# Patient Record
Sex: FEMALE | Race: OTHER | Employment: FULL TIME | URBAN - METROPOLITAN AREA
[De-identification: names, ages, dates, MRNs, and addresses within clinical notes are randomized per-mention and may not be internally consistent; named-entity substitution may affect disease eponyms.]

---

## 2017-06-07 PROBLEM — E01.0 THYROMEGALY: Status: ACTIVE | Noted: 2017-06-07

## 2017-06-07 PROBLEM — M54.2 PAIN IN THE NECK: Status: ACTIVE | Noted: 2017-06-07

## 2017-06-09 PROBLEM — E04.0 GOITER DIFFUSE: Status: ACTIVE | Noted: 2017-06-09

## 2017-10-17 PROBLEM — B37.31 VAGINAL YEAST INFECTION: Status: ACTIVE | Noted: 2017-10-17

## 2017-10-17 PROBLEM — E03.9 ACQUIRED HYPOTHYROIDISM: Status: ACTIVE | Noted: 2017-10-17

## 2017-10-17 PROBLEM — Z71.2 ENCOUNTER TO DISCUSS TEST RESULTS: Status: ACTIVE | Noted: 2017-10-17

## 2017-10-17 PROBLEM — Z91.89 AT RISK FOR SIDE EFFECT OF MEDICATION: Status: ACTIVE | Noted: 2017-10-17

## 2017-10-27 PROBLEM — E06.9 THYROIDITIS: Status: ACTIVE | Noted: 2017-10-27

## 2017-10-27 PROBLEM — R41.89 SLUGGISHNESS: Status: ACTIVE | Noted: 2017-10-27

## 2017-11-06 ENCOUNTER — APPOINTMENT (OUTPATIENT)
Dept: GENERAL RADIOLOGY | Age: 32
End: 2017-11-06
Attending: EMERGENCY MEDICINE
Payer: COMMERCIAL

## 2017-11-06 ENCOUNTER — HOSPITAL ENCOUNTER (EMERGENCY)
Age: 32
Discharge: HOME | End: 2017-11-06
Attending: EMERGENCY MEDICINE
Payer: COMMERCIAL

## 2017-11-06 VITALS
BODY MASS INDEX: 28.52 KG/M2 | RESPIRATION RATE: 18 BRPM | WEIGHT: 155 LBS | TEMPERATURE: 98 F | HEART RATE: 80 BPM | HEIGHT: 62 IN | OXYGEN SATURATION: 100 % | DIASTOLIC BLOOD PRESSURE: 84 MMHG | SYSTOLIC BLOOD PRESSURE: 146 MMHG

## 2017-11-06 DIAGNOSIS — R07.89 CHEST WALL PAIN: Primary | ICD-10-CM

## 2017-11-06 PROCEDURE — 99284 EMERGENCY DEPT VISIT MOD MDM: CPT | Performed by: EMERGENCY MEDICINE

## 2017-11-06 PROCEDURE — 93005 ELECTROCARDIOGRAM TRACING: CPT | Performed by: EMERGENCY MEDICINE

## 2017-11-06 PROCEDURE — 71020 XR CHEST PA LAT: CPT

## 2017-11-06 NOTE — ED NOTES
I have reviewed discharge instructions with the patient.   The patient verbalized understanding, ambulatory to the waiting room, states will follow up with PCP

## 2017-11-06 NOTE — LETTER
[facility] EMERGENCY DEPT 
[facility] [address] 
[phone] Work/School Note Date: 11/6/2017 To Whom It May concern: 
 
Yamilet Fatima was seen and treated today in the emergency room by the following provider(s): 
Attending Provider: Abran Perez MD.   
 
Yamilet Fatima may return to work on 11/7/2017. Sincerely, Gerald Gregory

## 2017-11-06 NOTE — ED TRIAGE NOTES
Patient initially complaining of left sided chest pain \"sharp\" \"for months\". Patient advises while sitting in class this morning it became worse and radiated into right side. After performing 12 lead EKG patient now describes pain as in her breast. Patient denies any shortness of breath.

## 2017-11-06 NOTE — DISCHARGE INSTRUCTIONS

## 2017-11-06 NOTE — ED PROVIDER NOTES
HPI Comments: Patient states she has been having left-sided sharp nonradiating chest pain off and on for the past 6 months. The pain seems to be getting more frequent and lasting longer recently. She has had constant pain for the past 2 days. She says the pain is somewhat positional and is reproducible to palpation of her anterior chest wall. She denies any trauma or obvious precipitating event. She has not taken any medicine for her symptoms and she denies symptoms in the past.  The pain has been constant for the past 2 days she came here for evaluation. She was using a NuvaRing for contraception but removed it herself 2 months ago. She denies any recent trips or trauma, no lower extremity swelling. She denies any history of clotting disorders in her family. Elements of this note were created using speech recognition software. As such, errors of speech recognition may be present. Patient is a 33 y.o. female presenting with chest pain. The history is provided by the patient. Chest Pain (Angina)    Pertinent negatives include no fever, no nausea and no vomiting. Past Medical History:   Diagnosis Date    Abnormal Pap smear     HGSIL    Anxiety     Asthma     last exacerbation >10yrs. rescue inhaler prn    Cervical dysplasia     H/O seasonal allergies     Other ill-defined conditions(799.89)     chronic acne    Thyroid disease     hypothyroid.  stable w/o med since 6/2012       Past Surgical History:   Procedure Laterality Date   Susana Wen GYN  2013    Cervical dysplasia-LEEP          Family History:   Problem Relation Age of Onset    Asthma Mother    Andrea Migraines Mother     Diabetes Mother     Anxiety Mother     Breast Cancer Maternal Aunt     Cancer Maternal Uncle      lung    Diabetes Maternal Grandmother     Diabetes Paternal Grandmother     Breast Cancer Other      maternal great aunt       Social History     Social History    Marital status: SINGLE     Spouse name: N/A   Emre Dickey of children: N/A    Years of education: N/A     Occupational History    Not on file. Social History Main Topics    Smoking status: Never Smoker    Smokeless tobacco: Never Used    Alcohol use Yes      Comment: rare    Drug use: No    Sexual activity: Yes     Birth control/ protection: None     Other Topics Concern    Not on file     Social History Narrative         ALLERGIES: Review of patient's allergies indicates no known allergies. Review of Systems   Constitutional: Negative for chills and fever. Cardiovascular: Positive for chest pain. Gastrointestinal: Negative for nausea and vomiting. All other systems reviewed and are negative. Vitals:    11/06/17 1238   BP: (!) 118/91   Pulse: 71   Resp: 16   Temp: 98 °F (36.7 °C)   SpO2: 100%   Weight: 70.3 kg (155 lb)   Height: 5' 2\" (1.575 m)            Physical Exam   Constitutional: She is oriented to person, place, and time. She appears well-developed and well-nourished. HENT:   Head: Normocephalic and atraumatic. Eyes: Conjunctivae are normal. Pupils are equal, round, and reactive to light. Neck: Normal range of motion. Neck supple. Cardiovascular: Normal rate and regular rhythm. Pulmonary/Chest: Effort normal and breath sounds normal. She exhibits tenderness. Reproducible chest wall tenderness to palpation as indicated   Abdominal: Soft. Bowel sounds are normal.   Musculoskeletal: She exhibits no edema or tenderness. Neurological: She is alert and oriented to person, place, and time. Skin: Skin is warm and dry. Psychiatric: She has a normal mood and affect. Her behavior is normal.   Nursing note and vitals reviewed. MDM  Number of Diagnoses or Management Options  Chest wall pain:   Diagnosis management comments: differential diagnosis: chest wall strain, pleurisy, PE, breast mass  3:00 PM discussed results with patient. She has a primary doctor she can follow up with.   Her pain is reproducible to palpation of her left upper anterior chest wall. Had long discussion with her about the extremely low risk of a PE and her need to return if her symptoms worsen.        Amount and/or Complexity of Data Reviewed  Tests in the radiology section of CPT®: ordered and reviewed  Tests in the medicine section of CPT®: ordered and reviewed    Risk of Complications, Morbidity, and/or Mortality  Presenting problems: moderate  Diagnostic procedures: moderate  Management options: moderate    Patient Progress  Patient progress: stable    ED Course       Procedures

## 2017-11-07 LAB
CALCULATED P AXIS, ECG09: 60 DEGREES
CALCULATED R AXIS, ECG10: 88 DEGREES
CALCULATED T AXIS, ECG11: 47 DEGREES
DIAGNOSIS, 93000: NORMAL
P-R INTERVAL, ECG05: 106 MS
Q-T INTERVAL, ECG07: 394 MS
QRS DURATION, ECG06: 80 MS
QTC CALCULATION (BEZET), ECG08: 429 MS
VENTRICULAR RATE, ECG03: 71 BPM

## 2018-04-11 ENCOUNTER — APPOINTMENT (RX ONLY)
Dept: URBAN - METROPOLITAN AREA CLINIC 349 | Facility: CLINIC | Age: 33
Setting detail: DERMATOLOGY
End: 2018-04-11

## 2018-04-11 DIAGNOSIS — L70.0 ACNE VULGARIS: ICD-10-CM

## 2018-04-11 PROBLEM — E03.9 HYPOTHYROIDISM, UNSPECIFIED: Status: ACTIVE | Noted: 2018-04-11

## 2018-04-11 PROBLEM — F41.9 ANXIETY DISORDER, UNSPECIFIED: Status: ACTIVE | Noted: 2018-04-11

## 2018-04-11 PROCEDURE — ? RECOMMENDATIONS

## 2018-04-11 PROCEDURE — ? PRESCRIPTION

## 2018-04-11 PROCEDURE — 99213 OFFICE O/P EST LOW 20 MIN: CPT

## 2018-04-11 PROCEDURE — ? COUNSELING

## 2018-04-11 RX ORDER — MINOCYCLINE HYDROCHLORIDE 100 MG/1
CAPSULE ORAL
Qty: 30 | Refills: 1 | Status: ERX | COMMUNITY
Start: 2018-04-11

## 2018-04-11 RX ORDER — DAPSONE 75 MG/G
GEL TOPICAL
Qty: 1 | Refills: 3 | Status: ERX | COMMUNITY
Start: 2018-04-11

## 2018-04-11 RX ORDER — BENZOYL PEROXIDE 95 MG/G
AEROSOL, FOAM TOPICAL
Qty: 1 | Refills: 2 | Status: ERX | COMMUNITY
Start: 2018-04-11

## 2018-04-11 RX ADMIN — MINOCYCLINE HYDROCHLORIDE: 100 CAPSULE ORAL at 16:24

## 2018-04-11 RX ADMIN — BENZOYL PEROXIDE: 95 AEROSOL, FOAM TOPICAL at 16:28

## 2018-04-11 RX ADMIN — DAPSONE: 75 GEL TOPICAL at 16:24

## 2018-04-11 ASSESSMENT — LOCATION SIMPLE DESCRIPTION DERM
LOCATION SIMPLE: CHEST
LOCATION SIMPLE: UPPER BACK
LOCATION SIMPLE: LEFT CHEEK
LOCATION SIMPLE: RIGHT CHEEK

## 2018-04-11 ASSESSMENT — LOCATION DETAILED DESCRIPTION DERM
LOCATION DETAILED: LEFT INFERIOR CENTRAL MALAR CHEEK
LOCATION DETAILED: UPPER STERNUM
LOCATION DETAILED: SUPERIOR THORACIC SPINE
LOCATION DETAILED: RIGHT INFERIOR CENTRAL MALAR CHEEK

## 2018-04-11 ASSESSMENT — LOCATION ZONE DERM
LOCATION ZONE: TRUNK
LOCATION ZONE: FACE

## 2018-04-11 NOTE — PROCEDURE: RECOMMENDATIONS
Detail Level: Zone
Recommendations (Free Text): Patient has thyroid problems on medication for this and checked regularly \\nPt states periods abnormal. She is going to GYN today, told check for PCOS. She is not taking BCP\\nMinocycline 100 mg once daily for 8 weeks \\nRiax foam apply to chest and back let dry, leave on overnight and was off every morning \\nAczone apply to clean dry face and chest nightly \\nPatient was on accutane in the past

## 2018-04-11 NOTE — PROCEDURE: COUNSELING
Topical Retinoid counseling:  Patient advised to apply a pea-sized amount only at bedtime and wait 30 minutes after washing their face before applying.  If too drying, patient may add a non-comedogenic moisturizer. The patient verbalized understanding of the proper use and possible adverse effects of retinoids.  All of the patient's questions and concerns were addressed.
Include Pregnancy/Lactation Warning?: No
Benzoyl Peroxide Counseling: Patient counseled that medicine may cause skin irritation and bleach clothing.  In the event of skin irritation, the patient was advised to reduce the amount of the drug applied or use it less frequently.   The patient verbalized understanding of the proper use and possible adverse effects of benzoyl peroxide.  All of the patient's questions and concerns were addressed.
Topical Retinoid Pregnancy And Lactation Text: This medication is Pregnancy Category C. It is unknown if this medication is excreted in breast milk.
Topical Clindamycin Pregnancy And Lactation Text: This medication is Pregnancy Category B and is considered safe during pregnancy. It is unknown if it is excreted in breast milk.
Erythromycin Pregnancy And Lactation Text: This medication is Pregnancy Category B and is considered safe during pregnancy. It is also excreted in breast milk.
High Dose Vitamin A Counseling: Side effects reviewed, pt to contact office should one occur.
High Dose Vitamin A Pregnancy And Lactation Text: High dose vitamin A therapy is contraindicated during pregnancy and breast feeding.
Birth Control Pills Counseling: Birth Control Pill Counseling: I discussed with the patient the potential side effects of OCPs including but not limited to increased risk of stroke, heart attack, thrombophlebitis, deep venous thrombosis, hepatic adenomas, breast changes, GI upset, headaches, and depression.  The patient verbalized understanding of the proper use and possible adverse effects of OCPs. All of the patient's questions and concerns were addressed.
Doxycycline Pregnancy And Lactation Text: This medication is Pregnancy Category D and not consider safe during pregnancy. It is also excreted in breast milk but is considered safe for shorter treatment courses.
Dapsone Pregnancy And Lactation Text: This medication is Pregnancy Category C and is not considered safe during pregnancy or breast feeding.
Minocycline Counseling: Patient advised regarding possible photosensitivity and discoloration of the teeth, skin, lips, tongue and gums.  Patient instructed to avoid sunlight, if possible.  When exposed to sunlight, patients should wear protective clothing, sunglasses, and sunscreen.  The patient was instructed to call the office immediately if the following severe adverse effects occur:  hearing changes, easy bruising/bleeding, severe headache, or vision changes.  The patient verbalized understanding of the proper use and possible adverse effects of minocycline.  All of the patient's questions and concerns were addressed.
Minocycline Pregnancy And Lactation Text: This medication is Pregnancy Category D and not consider safe during pregnancy. It is also excreted in breast milk.
Tetracycline Counseling: Patient counseled regarding possible photosensitivity and increased risk for sunburn.  Patient instructed to avoid sunlight, if possible.  When exposed to sunlight, patients should wear protective clothing, sunglasses, and sunscreen.  The patient was instructed to call the office immediately if the following severe adverse effects occur:  hearing changes, easy bruising/bleeding, severe headache, or vision changes.  The patient verbalized understanding of the proper use and possible adverse effects of tetracycline.  All of the patient's questions and concerns were addressed. Patient understands to avoid pregnancy while on therapy due to potential birth defects.
Dapsone Counseling: I discussed with the patient the risks of dapsone including but not limited to hemolytic anemia, agranulocytosis, rashes, methemoglobinemia, kidney failure, peripheral neuropathy, headaches, GI upset, and liver toxicity.  Patients who start dapsone require monitoring including baseline LFTs and weekly CBCs for the first month, then every month thereafter.  The patient verbalized understanding of the proper use and possible adverse effects of dapsone.  All of the patient's questions and concerns were addressed.
Isotretinoin Counseling: Patient should get monthly blood tests, not donate blood, not drive at night if vision affected, not share medication, and not undergo elective surgery for 6 months after tx completed. Side effects reviewed, pt to contact office should one occur.
Bactrim Counseling:  I discussed with the patient the risks of sulfa antibiotics including but not limited to GI upset, allergic reaction, drug rash, diarrhea, dizziness, photosensitivity, and yeast infections.  Rarely, more serious reactions can occur including but not limited to aplastic anemia, agranulocytosis, methemoglobinemia, blood dyscrasias, liver or kidney failure, lung infiltrates or desquamative/blistering drug rashes.
Tazorac Pregnancy And Lactation Text: This medication is not safe during pregnancy. It is unknown if this medication is excreted in breast milk.
Erythromycin Counseling:  I discussed with the patient the risks of erythromycin including but not limited to GI upset, allergic reaction, drug rash, diarrhea, increase in liver enzymes, and yeast infections.
Azithromycin Counseling:  I discussed with the patient the risks of azithromycin including but not limited to GI upset, allergic reaction, drug rash, diarrhea, and yeast infections.
Topical Sulfur Applications Counseling: Topical Sulfur Counseling: Patient counseled that this medication may cause skin irritation or allergic reactions.  In the event of skin irritation, the patient was advised to reduce the amount of the drug applied or use it less frequently.   The patient verbalized understanding of the proper use and possible adverse effects of topical sulfur application.  All of the patient's questions and concerns were addressed.
Spironolactone Counseling: Patient advised regarding risks of diarrhea, abdominal pain, hyperkalemia, birth defects (for female patients), liver toxicity and renal toxicity. The patient may need blood work to monitor liver and kidney function and potassium levels while on therapy. The patient verbalized understanding of the proper use and possible adverse effects of spironolactone.  All of the patient's questions and concerns were addressed.
Topical Clindamycin Counseling: Patient counseled that this medication may cause skin irritation or allergic reactions.  In the event of skin irritation, the patient was advised to reduce the amount of the drug applied or use it less frequently.   The patient verbalized understanding of the proper use and possible adverse effects of clindamycin.  All of the patient's questions and concerns were addressed.
Benzoyl Peroxide Pregnancy And Lactation Text: This medication is Pregnancy Category C. It is unknown if benzoyl peroxide is excreted in breast milk.
Tazorac Counseling:  Patient advised that medication is irritating and drying.  Patient may need to apply sparingly and wash off after an hour before eventually leaving it on overnight.  The patient verbalized understanding of the proper use and possible adverse effects of tazorac.  All of the patient's questions and concerns were addressed.
Isotretinoin Pregnancy And Lactation Text: This medication is Pregnancy Category X and is considered extremely dangerous during pregnancy. It is unknown if it is excreted in breast milk.
Topical Sulfur Applications Pregnancy And Lactation Text: This medication is Pregnancy Category C and has an unknown safety profile during pregnancy. It is unknown if this topical medication is excreted in breast milk.
Doxycycline Counseling:  Patient counseled regarding possible photosensitivity and increased risk for sunburn.  Patient instructed to avoid sunlight, if possible.  When exposed to sunlight, patients should wear protective clothing, sunglasses, and sunscreen.  The patient was instructed to call the office immediately if the following severe adverse effects occur:  hearing changes, easy bruising/bleeding, severe headache, or vision changes.  The patient verbalized understanding of the proper use and possible adverse effects of doxycycline.  All of the patient's questions and concerns were addressed.
Azithromycin Pregnancy And Lactation Text: This medication is considered safe during pregnancy and is also secreted in breast milk.
Birth Control Pills Pregnancy And Lactation Text: This medication should be avoided if pregnant and for the first 30 days post-partum.
Bactrim Pregnancy And Lactation Text: This medication is Pregnancy Category D and is known to cause fetal risk.  It is also excreted in breast milk.
Detail Level: Zone
Spironolactone Pregnancy And Lactation Text: This medication can cause feminization of the male fetus and should be avoided during pregnancy. The active metabolite is also found in breast milk.

## 2018-07-11 ENCOUNTER — RX ONLY (OUTPATIENT)
Age: 33
Setting detail: RX ONLY
End: 2018-07-11

## 2018-07-11 ENCOUNTER — APPOINTMENT (RX ONLY)
Dept: URBAN - METROPOLITAN AREA CLINIC 349 | Facility: CLINIC | Age: 33
Setting detail: DERMATOLOGY
End: 2018-07-11

## 2018-07-11 DIAGNOSIS — L70.0 ACNE VULGARIS: ICD-10-CM | Status: IMPROVED

## 2018-07-11 PROCEDURE — ? COUNSELING

## 2018-07-11 PROCEDURE — ? RECOMMENDATIONS

## 2018-07-11 PROCEDURE — ? PRESCRIPTION

## 2018-07-11 PROCEDURE — 99213 OFFICE O/P EST LOW 20 MIN: CPT

## 2018-07-11 RX ORDER — DROSPIRENONE AND ETHINYL ESTRADIOL 0.02-3(28)
KIT ORAL
Qty: 1 | Refills: 2 | Status: ERX | COMMUNITY
Start: 2018-07-11

## 2018-07-11 RX ORDER — BENZOYL PEROXIDE 95 MG/G
AEROSOL, FOAM TOPICAL
Qty: 1 | Refills: 2 | Status: ERX

## 2018-07-11 RX ADMIN — DROSPIRENONE AND ETHINYL ESTRADIOL: KIT at 15:23

## 2018-07-11 ASSESSMENT — LOCATION SIMPLE DESCRIPTION DERM
LOCATION SIMPLE: RIGHT CHEEK
LOCATION SIMPLE: LEFT CHEEK
LOCATION SIMPLE: CHEST
LOCATION SIMPLE: UPPER BACK

## 2018-07-11 ASSESSMENT — LOCATION DETAILED DESCRIPTION DERM
LOCATION DETAILED: RIGHT INFERIOR CENTRAL MALAR CHEEK
LOCATION DETAILED: UPPER STERNUM
LOCATION DETAILED: LEFT INFERIOR CENTRAL MALAR CHEEK
LOCATION DETAILED: SUPERIOR THORACIC SPINE

## 2018-07-11 ASSESSMENT — LOCATION ZONE DERM
LOCATION ZONE: FACE
LOCATION ZONE: TRUNK

## 2018-07-11 NOTE — PROCEDURE: COUNSELING
Detail Level: Zone
Topical Sulfur Applications Pregnancy And Lactation Text: This medication is Pregnancy Category C and has an unknown safety profile during pregnancy. It is unknown if this topical medication is excreted in breast milk.
Erythromycin Counseling:  I discussed with the patient the risks of erythromycin including but not limited to GI upset, allergic reaction, drug rash, diarrhea, increase in liver enzymes, and yeast infections.
Include Pregnancy/Lactation Warning?: No
Spironolactone Pregnancy And Lactation Text: This medication can cause feminization of the male fetus and should be avoided during pregnancy. The active metabolite is also found in breast milk.
High Dose Vitamin A Pregnancy And Lactation Text: High dose vitamin A therapy is contraindicated during pregnancy and breast feeding.
Birth Control Pills Counseling: Birth Control Pill Counseling: I discussed with the patient the potential side effects of OCPs including but not limited to increased risk of stroke, heart attack, thrombophlebitis, deep venous thrombosis, hepatic adenomas, breast changes, GI upset, headaches, and depression.  The patient verbalized understanding of the proper use and possible adverse effects of OCPs. All of the patient's questions and concerns were addressed.
Birth Control Pills Pregnancy And Lactation Text: This medication should be avoided if pregnant and for the first 30 days post-partum.
Tazorac Counseling:  Patient advised that medication is irritating and drying.  Patient may need to apply sparingly and wash off after an hour before eventually leaving it on overnight.  The patient verbalized understanding of the proper use and possible adverse effects of tazorac.  All of the patient's questions and concerns were addressed.
Topical Clindamycin Counseling: Patient counseled that this medication may cause skin irritation or allergic reactions.  In the event of skin irritation, the patient was advised to reduce the amount of the drug applied or use it less frequently.   The patient verbalized understanding of the proper use and possible adverse effects of clindamycin.  All of the patient's questions and concerns were addressed.
High Dose Vitamin A Counseling: Side effects reviewed, pt to contact office should one occur.
Topical Clindamycin Pregnancy And Lactation Text: This medication is Pregnancy Category B and is considered safe during pregnancy. It is unknown if it is excreted in breast milk.
Tetracycline Pregnancy And Lactation Text: This medication is Pregnancy Category D and not consider safe during pregnancy. It is also excreted in breast milk.
Bactrim Counseling:  I discussed with the patient the risks of sulfa antibiotics including but not limited to GI upset, allergic reaction, drug rash, diarrhea, dizziness, photosensitivity, and yeast infections.  Rarely, more serious reactions can occur including but not limited to aplastic anemia, agranulocytosis, methemoglobinemia, blood dyscrasias, liver or kidney failure, lung infiltrates or desquamative/blistering drug rashes.
Topical Retinoid Pregnancy And Lactation Text: This medication is Pregnancy Category C. It is unknown if this medication is excreted in breast milk.
Tetracycline Counseling: Patient counseled regarding possible photosensitivity and increased risk for sunburn.  Patient instructed to avoid sunlight, if possible.  When exposed to sunlight, patients should wear protective clothing, sunglasses, and sunscreen.  The patient was instructed to call the office immediately if the following severe adverse effects occur:  hearing changes, easy bruising/bleeding, severe headache, or vision changes.  The patient verbalized understanding of the proper use and possible adverse effects of tetracycline.  All of the patient's questions and concerns were addressed. Patient understands to avoid pregnancy while on therapy due to potential birth defects.
Dapsone Counseling: I discussed with the patient the risks of dapsone including but not limited to hemolytic anemia, agranulocytosis, rashes, methemoglobinemia, kidney failure, peripheral neuropathy, headaches, GI upset, and liver toxicity.  Patients who start dapsone require monitoring including baseline LFTs and weekly CBCs for the first month, then every month thereafter.  The patient verbalized understanding of the proper use and possible adverse effects of dapsone.  All of the patient's questions and concerns were addressed.
Tazorac Pregnancy And Lactation Text: This medication is not safe during pregnancy. It is unknown if this medication is excreted in breast milk.
Isotretinoin Counseling: Patient should get monthly blood tests, not donate blood, not drive at night if vision affected, not share medication, and not undergo elective surgery for 6 months after tx completed. Side effects reviewed, pt to contact office should one occur.
Spironolactone Counseling: Patient advised regarding risks of diarrhea, abdominal pain, hyperkalemia, birth defects (for female patients), liver toxicity and renal toxicity. The patient may need blood work to monitor liver and kidney function and potassium levels while on therapy. The patient verbalized understanding of the proper use and possible adverse effects of spironolactone.  All of the patient's questions and concerns were addressed.
Doxycycline Pregnancy And Lactation Text: This medication is Pregnancy Category D and not consider safe during pregnancy. It is also excreted in breast milk but is considered safe for shorter treatment courses.
Azithromycin Pregnancy And Lactation Text: This medication is considered safe during pregnancy and is also secreted in breast milk.
Topical Retinoid counseling:  Patient advised to apply a pea-sized amount only at bedtime and wait 30 minutes after washing their face before applying.  If too drying, patient may add a non-comedogenic moisturizer. The patient verbalized understanding of the proper use and possible adverse effects of retinoids.  All of the patient's questions and concerns were addressed.
Benzoyl Peroxide Counseling: Patient counseled that medicine may cause skin irritation and bleach clothing.  In the event of skin irritation, the patient was advised to reduce the amount of the drug applied or use it less frequently.   The patient verbalized understanding of the proper use and possible adverse effects of benzoyl peroxide.  All of the patient's questions and concerns were addressed.
Azithromycin Counseling:  I discussed with the patient the risks of azithromycin including but not limited to GI upset, allergic reaction, drug rash, diarrhea, and yeast infections.
Benzoyl Peroxide Pregnancy And Lactation Text: This medication is Pregnancy Category C. It is unknown if benzoyl peroxide is excreted in breast milk.
Minocycline Counseling: Patient advised regarding possible photosensitivity and discoloration of the teeth, skin, lips, tongue and gums.  Patient instructed to avoid sunlight, if possible.  When exposed to sunlight, patients should wear protective clothing, sunglasses, and sunscreen.  The patient was instructed to call the office immediately if the following severe adverse effects occur:  hearing changes, easy bruising/bleeding, severe headache, or vision changes.  The patient verbalized understanding of the proper use and possible adverse effects of minocycline.  All of the patient's questions and concerns were addressed.
Doxycycline Counseling:  Patient counseled regarding possible photosensitivity and increased risk for sunburn.  Patient instructed to avoid sunlight, if possible.  When exposed to sunlight, patients should wear protective clothing, sunglasses, and sunscreen.  The patient was instructed to call the office immediately if the following severe adverse effects occur:  hearing changes, easy bruising/bleeding, severe headache, or vision changes.  The patient verbalized understanding of the proper use and possible adverse effects of doxycycline.  All of the patient's questions and concerns were addressed.
Isotretinoin Pregnancy And Lactation Text: This medication is Pregnancy Category X and is considered extremely dangerous during pregnancy. It is unknown if it is excreted in breast milk.
Erythromycin Pregnancy And Lactation Text: This medication is Pregnancy Category B and is considered safe during pregnancy. It is also excreted in breast milk.
Dapsone Pregnancy And Lactation Text: This medication is Pregnancy Category C and is not considered safe during pregnancy or breast feeding.
Topical Sulfur Applications Counseling: Topical Sulfur Counseling: Patient counseled that this medication may cause skin irritation or allergic reactions.  In the event of skin irritation, the patient was advised to reduce the amount of the drug applied or use it less frequently.   The patient verbalized understanding of the proper use and possible adverse effects of topical sulfur application.  All of the patient's questions and concerns were addressed.
Bactrim Pregnancy And Lactation Text: This medication is Pregnancy Category D and is known to cause fetal risk.  It is also excreted in breast milk.

## 2018-07-11 NOTE — PROCEDURE: RECOMMENDATIONS
Detail Level: Zone
Recommendations (Free Text): Kari bcp everyday \\nContinue Riax foam apply to chest and back let dry, leave on overnight and was off every morning \\nStart Aczone apply to clean dry face twice a day

## 2018-08-21 PROBLEM — T14.8XXA MUSCLE STRAIN: Status: ACTIVE | Noted: 2018-08-21

## 2018-08-21 PROBLEM — M54.9 UPPER BACK PAIN: Status: ACTIVE | Noted: 2018-08-21

## 2018-10-04 ENCOUNTER — APPOINTMENT (RX ONLY)
Dept: URBAN - METROPOLITAN AREA CLINIC 349 | Facility: CLINIC | Age: 33
Setting detail: DERMATOLOGY
End: 2018-10-04

## 2018-10-04 DIAGNOSIS — L70.0 ACNE VULGARIS: ICD-10-CM | Status: STABLE

## 2018-10-04 PROBLEM — F41.9 ANXIETY DISORDER, UNSPECIFIED: Status: ACTIVE | Noted: 2018-10-04

## 2018-10-04 PROCEDURE — ? COUNSELING

## 2018-10-04 PROCEDURE — ? PRESCRIPTION

## 2018-10-04 PROCEDURE — 99213 OFFICE O/P EST LOW 20 MIN: CPT

## 2018-10-04 PROCEDURE — ? RECOMMENDATIONS

## 2018-10-04 RX ORDER — SODIUM SULFACETAMIDE AND SULFUR 80; 40 MG/ML; MG/ML
LOTION TOPICAL
Qty: 1 | Refills: 3 | Status: ERX | COMMUNITY
Start: 2018-10-04

## 2018-10-04 RX ADMIN — SODIUM SULFACETAMIDE AND SULFUR: 80; 40 LOTION TOPICAL at 18:03

## 2018-10-04 ASSESSMENT — LOCATION ZONE DERM
LOCATION ZONE: TRUNK
LOCATION ZONE: FACE

## 2018-10-04 ASSESSMENT — LOCATION DETAILED DESCRIPTION DERM
LOCATION DETAILED: UPPER STERNUM
LOCATION DETAILED: RIGHT INFERIOR CENTRAL MALAR CHEEK
LOCATION DETAILED: LEFT INFERIOR CENTRAL MALAR CHEEK
LOCATION DETAILED: SUPERIOR THORACIC SPINE

## 2018-10-04 ASSESSMENT — LOCATION SIMPLE DESCRIPTION DERM
LOCATION SIMPLE: RIGHT CHEEK
LOCATION SIMPLE: UPPER BACK
LOCATION SIMPLE: LEFT CHEEK
LOCATION SIMPLE: CHEST

## 2018-10-04 NOTE — PROCEDURE: RECOMMENDATIONS
Recommendations (Free Text): Pt stopped bcp because of anxiety \\nContinue Riax foam apply to chest and back let dry, leave on overnight and was off every morning \\nContinue Aczone apply to clean dry face twice a day\\nSulfacleanse 8-4% wash face twice a day
Detail Level: Zone

## 2018-10-04 NOTE — PROCEDURE: COUNSELING
Doxycycline Pregnancy And Lactation Text: This medication is Pregnancy Category D and not consider safe during pregnancy. It is also excreted in breast milk but is considered safe for shorter treatment courses.
Tetracycline Counseling: Patient counseled regarding possible photosensitivity and increased risk for sunburn.  Patient instructed to avoid sunlight, if possible.  When exposed to sunlight, patients should wear protective clothing, sunglasses, and sunscreen.  The patient was instructed to call the office immediately if the following severe adverse effects occur:  hearing changes, easy bruising/bleeding, severe headache, or vision changes.  The patient verbalized understanding of the proper use and possible adverse effects of tetracycline.  All of the patient's questions and concerns were addressed. Patient understands to avoid pregnancy while on therapy due to potential birth defects.
Spironolactone Pregnancy And Lactation Text: This medication can cause feminization of the male fetus and should be avoided during pregnancy. The active metabolite is also found in breast milk.
Azithromycin Counseling:  I discussed with the patient the risks of azithromycin including but not limited to GI upset, allergic reaction, drug rash, diarrhea, and yeast infections.
Doxycycline Counseling:  Patient counseled regarding possible photosensitivity and increased risk for sunburn.  Patient instructed to avoid sunlight, if possible.  When exposed to sunlight, patients should wear protective clothing, sunglasses, and sunscreen.  The patient was instructed to call the office immediately if the following severe adverse effects occur:  hearing changes, easy bruising/bleeding, severe headache, or vision changes.  The patient verbalized understanding of the proper use and possible adverse effects of doxycycline.  All of the patient's questions and concerns were addressed.
Dapsone Pregnancy And Lactation Text: This medication is Pregnancy Category C and is not considered safe during pregnancy or breast feeding.
High Dose Vitamin A Pregnancy And Lactation Text: High dose vitamin A therapy is contraindicated during pregnancy and breast feeding.
Minocycline Pregnancy And Lactation Text: This medication is Pregnancy Category D and not consider safe during pregnancy. It is also excreted in breast milk.
Isotretinoin Counseling: Patient should get monthly blood tests, not donate blood, not drive at night if vision affected, not share medication, and not undergo elective surgery for 6 months after tx completed. Side effects reviewed, pt to contact office should one occur.
Topical Retinoid counseling:  Patient advised to apply a pea-sized amount only at bedtime and wait 30 minutes after washing their face before applying.  If too drying, patient may add a non-comedogenic moisturizer. The patient verbalized understanding of the proper use and possible adverse effects of retinoids.  All of the patient's questions and concerns were addressed.
Birth Control Pills Pregnancy And Lactation Text: This medication should be avoided if pregnant and for the first 30 days post-partum.
Birth Control Pills Counseling: Birth Control Pill Counseling: I discussed with the patient the potential side effects of OCPs including but not limited to increased risk of stroke, heart attack, thrombophlebitis, deep venous thrombosis, hepatic adenomas, breast changes, GI upset, headaches, and depression.  The patient verbalized understanding of the proper use and possible adverse effects of OCPs. All of the patient's questions and concerns were addressed.
Detail Level: Zone
Isotretinoin Pregnancy And Lactation Text: This medication is Pregnancy Category X and is considered extremely dangerous during pregnancy. It is unknown if it is excreted in breast milk.
Topical Sulfur Applications Counseling: Topical Sulfur Counseling: Patient counseled that this medication may cause skin irritation or allergic reactions.  In the event of skin irritation, the patient was advised to reduce the amount of the drug applied or use it less frequently.   The patient verbalized understanding of the proper use and possible adverse effects of topical sulfur application.  All of the patient's questions and concerns were addressed.
Include Pregnancy/Lactation Warning?: No
Topical Retinoid Pregnancy And Lactation Text: This medication is Pregnancy Category C. It is unknown if this medication is excreted in breast milk.
Topical Clindamycin Pregnancy And Lactation Text: This medication is Pregnancy Category B and is considered safe during pregnancy. It is unknown if it is excreted in breast milk.
Erythromycin Pregnancy And Lactation Text: This medication is Pregnancy Category B and is considered safe during pregnancy. It is also excreted in breast milk.
Spironolactone Counseling: Patient advised regarding risks of diarrhea, abdominal pain, hyperkalemia, birth defects (for female patients), liver toxicity and renal toxicity. The patient may need blood work to monitor liver and kidney function and potassium levels while on therapy. The patient verbalized understanding of the proper use and possible adverse effects of spironolactone.  All of the patient's questions and concerns were addressed.
Benzoyl Peroxide Pregnancy And Lactation Text: This medication is Pregnancy Category C. It is unknown if benzoyl peroxide is excreted in breast milk.
Dapsone Counseling: I discussed with the patient the risks of dapsone including but not limited to hemolytic anemia, agranulocytosis, rashes, methemoglobinemia, kidney failure, peripheral neuropathy, headaches, GI upset, and liver toxicity.  Patients who start dapsone require monitoring including baseline LFTs and weekly CBCs for the first month, then every month thereafter.  The patient verbalized understanding of the proper use and possible adverse effects of dapsone.  All of the patient's questions and concerns were addressed.
Bactrim Counseling:  I discussed with the patient the risks of sulfa antibiotics including but not limited to GI upset, allergic reaction, drug rash, diarrhea, dizziness, photosensitivity, and yeast infections.  Rarely, more serious reactions can occur including but not limited to aplastic anemia, agranulocytosis, methemoglobinemia, blood dyscrasias, liver or kidney failure, lung infiltrates or desquamative/blistering drug rashes.
Benzoyl Peroxide Counseling: Patient counseled that medicine may cause skin irritation and bleach clothing.  In the event of skin irritation, the patient was advised to reduce the amount of the drug applied or use it less frequently.   The patient verbalized understanding of the proper use and possible adverse effects of benzoyl peroxide.  All of the patient's questions and concerns were addressed.
Tazorac Counseling:  Patient advised that medication is irritating and drying.  Patient may need to apply sparingly and wash off after an hour before eventually leaving it on overnight.  The patient verbalized understanding of the proper use and possible adverse effects of tazorac.  All of the patient's questions and concerns were addressed.
Azithromycin Pregnancy And Lactation Text: This medication is considered safe during pregnancy and is also secreted in breast milk.
Bactrim Pregnancy And Lactation Text: This medication is Pregnancy Category D and is known to cause fetal risk.  It is also excreted in breast milk.
Topical Clindamycin Counseling: Patient counseled that this medication may cause skin irritation or allergic reactions.  In the event of skin irritation, the patient was advised to reduce the amount of the drug applied or use it less frequently.   The patient verbalized understanding of the proper use and possible adverse effects of clindamycin.  All of the patient's questions and concerns were addressed.
Topical Sulfur Applications Pregnancy And Lactation Text: This medication is Pregnancy Category C and has an unknown safety profile during pregnancy. It is unknown if this topical medication is excreted in breast milk.
Tazorac Pregnancy And Lactation Text: This medication is not safe during pregnancy. It is unknown if this medication is excreted in breast milk.
Minocycline Counseling: Patient advised regarding possible photosensitivity and discoloration of the teeth, skin, lips, tongue and gums.  Patient instructed to avoid sunlight, if possible.  When exposed to sunlight, patients should wear protective clothing, sunglasses, and sunscreen.  The patient was instructed to call the office immediately if the following severe adverse effects occur:  hearing changes, easy bruising/bleeding, severe headache, or vision changes.  The patient verbalized understanding of the proper use and possible adverse effects of minocycline.  All of the patient's questions and concerns were addressed.
High Dose Vitamin A Counseling: Side effects reviewed, pt to contact office should one occur.
Erythromycin Counseling:  I discussed with the patient the risks of erythromycin including but not limited to GI upset, allergic reaction, drug rash, diarrhea, increase in liver enzymes, and yeast infections.

## 2019-02-15 PROBLEM — Z91.89 AT RISK FOR SIDE EFFECT OF MEDICATION: Status: RESOLVED | Noted: 2017-10-17 | Resolved: 2019-02-15

## 2019-02-15 PROBLEM — Z87.42 HX OF ABNORMAL CERVICAL PAP SMEAR: Status: ACTIVE | Noted: 2019-02-15

## 2019-02-15 PROBLEM — Z71.2 ENCOUNTER TO DISCUSS TEST RESULTS: Status: RESOLVED | Noted: 2017-10-17 | Resolved: 2019-02-15

## 2019-02-15 PROBLEM — Z80.3 FAMILY HISTORY OF BREAST CANCER: Status: ACTIVE | Noted: 2019-02-15

## 2019-02-15 PROBLEM — B37.31 VAGINAL YEAST INFECTION: Status: RESOLVED | Noted: 2017-10-17 | Resolved: 2019-02-15

## 2019-02-15 PROBLEM — N64.4 MASTALGIA: Status: ACTIVE | Noted: 2019-02-15

## 2019-02-15 PROBLEM — Z30.09 CONTRACEPTIVE EDUCATION: Status: ACTIVE | Noted: 2019-02-15

## 2019-02-15 PROBLEM — A60.09 HERPES GENITALIS IN WOMEN: Status: ACTIVE | Noted: 2019-02-15

## 2019-06-14 ENCOUNTER — HOSPITAL ENCOUNTER (EMERGENCY)
Age: 34
Discharge: HOME | End: 2019-06-14
Attending: EMERGENCY MEDICINE
Payer: COMMERCIAL

## 2019-06-14 VITALS
HEIGHT: 62 IN | OXYGEN SATURATION: 100 % | WEIGHT: 160 LBS | DIASTOLIC BLOOD PRESSURE: 76 MMHG | TEMPERATURE: 98.1 F | SYSTOLIC BLOOD PRESSURE: 120 MMHG | BODY MASS INDEX: 29.44 KG/M2 | RESPIRATION RATE: 16 BRPM | HEART RATE: 75 BPM

## 2019-06-14 DIAGNOSIS — J02.9 ACUTE PHARYNGITIS, UNSPECIFIED ETIOLOGY: Primary | ICD-10-CM

## 2019-06-14 DIAGNOSIS — R00.2 PALPITATIONS: ICD-10-CM

## 2019-06-14 LAB
ALBUMIN SERPL-MCNC: 4.1 G/DL (ref 3.5–5)
ALBUMIN/GLOB SERPL: 1 {RATIO} (ref 1.2–3.5)
ALP SERPL-CCNC: 56 U/L (ref 50–136)
ALT SERPL-CCNC: 46 U/L (ref 12–65)
ANION GAP SERPL CALC-SCNC: 8 MMOL/L (ref 7–16)
AST SERPL-CCNC: 18 U/L (ref 15–37)
ATRIAL RATE: 70 BPM
BASOPHILS # BLD: 0 K/UL (ref 0–0.2)
BASOPHILS NFR BLD: 1 % (ref 0–2)
BILIRUB SERPL-MCNC: 0.4 MG/DL (ref 0.2–1.1)
BUN SERPL-MCNC: 17 MG/DL (ref 6–23)
CALCIUM SERPL-MCNC: 9.4 MG/DL (ref 8.3–10.4)
CALCULATED P AXIS, ECG09: 67 DEGREES
CALCULATED R AXIS, ECG10: 87 DEGREES
CALCULATED T AXIS, ECG11: 43 DEGREES
CHLORIDE SERPL-SCNC: 104 MMOL/L (ref 98–107)
CO2 SERPL-SCNC: 26 MMOL/L (ref 21–32)
CREAT SERPL-MCNC: 0.76 MG/DL (ref 0.6–1)
DIAGNOSIS, 93000: NORMAL
DIFFERENTIAL METHOD BLD: ABNORMAL
EOSINOPHIL # BLD: 0.1 K/UL (ref 0–0.8)
EOSINOPHIL NFR BLD: 2 % (ref 0.5–7.8)
ERYTHROCYTE [DISTWIDTH] IN BLOOD BY AUTOMATED COUNT: 13.4 % (ref 11.9–14.6)
GLOBULIN SER CALC-MCNC: 4.2 G/DL (ref 2.3–3.5)
GLUCOSE SERPL-MCNC: 82 MG/DL (ref 65–100)
HCG UR QL: NEGATIVE
HCT VFR BLD AUTO: 44.7 % (ref 35.8–46.3)
HGB BLD-MCNC: 13.9 G/DL (ref 11.7–15.4)
IMM GRANULOCYTES # BLD AUTO: 0 K/UL (ref 0–0.5)
IMM GRANULOCYTES NFR BLD AUTO: 0 % (ref 0–5)
LYMPHOCYTES # BLD: 1.8 K/UL (ref 0.5–4.6)
LYMPHOCYTES NFR BLD: 28 % (ref 13–44)
MAGNESIUM SERPL-MCNC: 2.1 MG/DL (ref 1.8–2.4)
MCH RBC QN AUTO: 27.9 PG (ref 26.1–32.9)
MCHC RBC AUTO-ENTMCNC: 31.1 G/DL (ref 31.4–35)
MCV RBC AUTO: 89.6 FL (ref 79.6–97.8)
MONOCYTES # BLD: 0.4 K/UL (ref 0.1–1.3)
MONOCYTES NFR BLD: 6 % (ref 4–12)
NEUTS SEG # BLD: 4 K/UL (ref 1.7–8.2)
NEUTS SEG NFR BLD: 63 % (ref 43–78)
NRBC # BLD: 0 K/UL (ref 0–0.2)
P-R INTERVAL, ECG05: 112 MS
PLATELET # BLD AUTO: 247 K/UL (ref 150–450)
PMV BLD AUTO: 11.7 FL (ref 9.4–12.3)
POTASSIUM SERPL-SCNC: 3.5 MMOL/L (ref 3.5–5.1)
PROT SERPL-MCNC: 8.3 G/DL (ref 6.3–8.2)
Q-T INTERVAL, ECG07: 404 MS
QRS DURATION, ECG06: 78 MS
QTC CALCULATION (BEZET), ECG08: 436 MS
RBC # BLD AUTO: 4.99 M/UL (ref 4.05–5.2)
SODIUM SERPL-SCNC: 138 MMOL/L (ref 136–145)
VENTRICULAR RATE, ECG03: 70 BPM
WBC # BLD AUTO: 6.3 K/UL (ref 4.3–11.1)

## 2019-06-14 PROCEDURE — 99284 EMERGENCY DEPT VISIT MOD MDM: CPT | Performed by: EMERGENCY MEDICINE

## 2019-06-14 PROCEDURE — 93005 ELECTROCARDIOGRAM TRACING: CPT | Performed by: EMERGENCY MEDICINE

## 2019-06-14 PROCEDURE — 81025 URINE PREGNANCY TEST: CPT

## 2019-06-14 PROCEDURE — 85025 COMPLETE CBC W/AUTO DIFF WBC: CPT

## 2019-06-14 PROCEDURE — 81003 URINALYSIS AUTO W/O SCOPE: CPT | Performed by: EMERGENCY MEDICINE

## 2019-06-14 PROCEDURE — 83735 ASSAY OF MAGNESIUM: CPT

## 2019-06-14 PROCEDURE — 80053 COMPREHEN METABOLIC PANEL: CPT

## 2019-06-14 RX ORDER — CIPROFLOXACIN 500 MG/1
500 TABLET ORAL
COMMUNITY
Start: 2019-06-12 | End: 2019-06-15

## 2019-06-14 RX ORDER — PENICILLIN V POTASSIUM 500 MG/1
500 TABLET, FILM COATED ORAL 2 TIMES DAILY
Qty: 20 TAB | Refills: 0 | Status: SHIPPED | OUTPATIENT
Start: 2019-06-14 | End: 2019-06-24

## 2019-06-14 NOTE — ED TRIAGE NOTES
Pt c/o feeling like her heart is racing and she cannot catch her breath. Pt states she is currently on meds for UTI that she went to PCP on Wednesday to be treated. Pt does not appear in distress in triage. Pt c/o allergy symptoms, feeling congested. She is concerned about strep throat.

## 2019-06-14 NOTE — ED PROVIDER NOTES
Patient states she has been having episodes of palpitations, intermittent knee for the past year. She states it happens a couple times a month, typically. She describes it as a fluttering feeling with some shortness of breath. She states that recently the palpitations have been lasting longer. She denies any pattern to the palpitations, denies any aggravating or alleviating factors. She has not taken any medicine for her symptoms. She also has been having a sore throat over the past couple days. She denies any fever, states the pain is worse when she swallows. Elements of this note were created using speech recognition software. As such, errors of speech recognition may be present. Past Medical History:   Diagnosis Date    Abnormal Pap smear     HGSIL    Abnormal Papanicolaou smear of cervix     Anxiety     Asthma     last exacerbation >10yrs. rescue inhaler prn    Cervical dysplasia     H/O seasonal allergies     Other ill-defined conditions(799.89)     chronic acne    PTSD (post-traumatic stress disorder)     Shingles     2 weeks ago    Thyroid disease     hypothyroid.  stable w/o med since 6/2012       Past Surgical History:   Procedure Laterality Date   [facility]  2013    Cervical dysplasia-LEEP          Family History:   Problem Relation Age of Onset    Asthma Mother    [facility] Migraines Mother     Diabetes Mother     Anxiety Mother     Breast Cancer Maternal Aunt     Cancer Maternal Uncle         lung    Diabetes Maternal Grandmother     Diabetes Paternal Grandmother     Breast Cancer Other         maternal great aunt       Social History     Socioeconomic History    Marital status: SINGLE     Spouse name: Not on file    Number of children: Not on file    Years of education: Not on file    Highest education level: Not on file   Occupational History    Not on file   Social Needs    Financial resource strain: Not on file    Food insecurity:     Worry: Not on file Inability: Not on file    Transportation needs:     Medical: Not on file     Non-medical: Not on file   Tobacco Use    Smoking status: Never Smoker    Smokeless tobacco: Never Used   Substance and Sexual Activity    Alcohol use: Yes     Comment: rare    Drug use: No    Sexual activity: Yes     Partners: Male     Birth control/protection: Inserts   Lifestyle    Physical activity:     Days per week: Not on file     Minutes per session: Not on file    Stress: Not on file   Relationships    Social connections:     Talks on phone: Not on file     Gets together: Not on file     Attends Hinduism service: Not on file     Active member of club or organization: Not on file     Attends meetings of clubs or organizations: Not on file     Relationship status: Not on file    Intimate partner violence:     Fear of current or ex partner: Not on file     Emotionally abused: Not on file     Physically abused: Not on file     Forced sexual activity: Not on file   Other Topics Concern     Service Not Asked    Blood Transfusions Not Asked    Caffeine Concern No    Occupational Exposure Not Asked   Williemae Red Hazards Not Asked    Sleep Concern Not Asked    Stress Concern Not Asked    Weight Concern Not Asked    Special Diet Not Asked    Back Care Not Asked    Exercise Yes    Bike Helmet Not Asked    Seat Belt Yes    Self-Exams Yes   Social History Narrative    Abuse: Feels safe at home, no history of physical abuse, no history of sexual abuse         ALLERGIES: Patient has no known allergies. Review of Systems   Constitutional: Negative for chills and fever. HENT: Positive for sore throat. Cardiovascular: Positive for palpitations. Negative for chest pain. Gastrointestinal: Negative for nausea and vomiting. All other systems reviewed and are negative.       Vitals:    06/14/19 1202   BP: (!) 134/98   Pulse: 75   Resp: 16   Temp: 98.1 °F (36.7 °C)   SpO2: 100%   Weight: 72.6 kg (160 lb)   Height: 5' 2\" (1.575 m)            Physical Exam   Constitutional: She is oriented to person, place, and time. She appears well-developed and well-nourished. HENT:   Head: Normocephalic and atraumatic. Mouth/Throat: Oropharyngeal exudate present. Eyes: Pupils are equal, round, and reactive to light. Conjunctivae are normal.   Neck: Normal range of motion. Neck supple. Cardiovascular: Normal rate, regular rhythm and normal heart sounds. Pulmonary/Chest: Effort normal and breath sounds normal.   Abdominal: Soft. Bowel sounds are normal.   Musculoskeletal: She exhibits no edema or tenderness. Neurological: She is alert and oriented to person, place, and time. Skin: Skin is warm and dry. Psychiatric: She has a normal mood and affect. Her behavior is normal.   Nursing note and vitals reviewed. MDM  Number of Diagnoses or Management Options  Acute pharyngitis, unspecified etiology: new and does not require workup  Palpitations: new and requires workup  Diagnosis management comments: 2:16 PM discussed results with patient, she has a primary doctor. She can follow-up with.        Amount and/or Complexity of Data Reviewed  Clinical lab tests: ordered and reviewed    Risk of Complications, Morbidity, and/or Mortality  Presenting problems: moderate  Diagnostic procedures: moderate  Management options: moderate    Patient Progress  Patient progress: stable         Procedures

## 2019-06-14 NOTE — ED NOTES
I have reviewed discharge instructions with the patient. The patient verbalized understanding. Patient left ED via Discharge Method: ambulatory to Home with self. Opportunity for questions and clarification provided. Patient given 1 scripts. To continue your aftercare when you leave the hospital, you may receive an automated call from our care team to check in on how you are doing. This is a free service and part of our promise to provide the best care and service to meet your aftercare needs.  If you have questions, or wish to unsubscribe from this service please call [phone]. Thank you for Choosing our [facility] Emergency Department.

## 2022-03-18 PROBLEM — T14.8XXA MUSCLE STRAIN: Status: ACTIVE | Noted: 2018-08-21

## 2022-03-18 PROBLEM — Z80.3 FAMILY HISTORY OF BREAST CANCER: Status: ACTIVE | Noted: 2019-02-15

## 2022-03-18 PROBLEM — N64.4 MASTALGIA: Status: ACTIVE | Noted: 2019-02-15

## 2022-03-19 PROBLEM — E04.0 GOITER DIFFUSE: Status: ACTIVE | Noted: 2017-06-09

## 2022-03-19 PROBLEM — E06.9 THYROIDITIS: Status: ACTIVE | Noted: 2017-10-27

## 2022-03-19 PROBLEM — Z30.09 CONTRACEPTIVE EDUCATION: Status: ACTIVE | Noted: 2019-02-15

## 2022-03-19 PROBLEM — E03.9 ACQUIRED HYPOTHYROIDISM: Status: ACTIVE | Noted: 2017-10-17

## 2022-03-19 PROBLEM — M54.9 UPPER BACK PAIN: Status: ACTIVE | Noted: 2018-08-21

## 2022-03-19 PROBLEM — M54.2 PAIN IN THE NECK: Status: ACTIVE | Noted: 2017-06-07

## 2022-03-19 PROBLEM — A60.09 HERPES GENITALIS IN WOMEN: Status: ACTIVE | Noted: 2019-02-15

## 2022-03-19 PROBLEM — R41.89 SLUGGISHNESS: Status: ACTIVE | Noted: 2017-10-27

## 2022-03-19 PROBLEM — E01.0 THYROMEGALY: Status: ACTIVE | Noted: 2017-06-07

## 2022-03-20 PROBLEM — Z87.42 HX OF ABNORMAL CERVICAL PAP SMEAR: Status: ACTIVE | Noted: 2019-02-15

## 2022-06-30 ENCOUNTER — HOSPITAL ENCOUNTER (EMERGENCY)
Age: 37
Discharge: HOME | End: 2022-06-30
Attending: EMERGENCY MEDICINE
Payer: COMMERCIAL

## 2022-06-30 VITALS
BODY MASS INDEX: 28.52 KG/M2 | DIASTOLIC BLOOD PRESSURE: 88 MMHG | TEMPERATURE: 99 F | HEIGHT: 62 IN | WEIGHT: 155 LBS | OXYGEN SATURATION: 100 % | HEART RATE: 78 BPM | SYSTOLIC BLOOD PRESSURE: 125 MMHG | RESPIRATION RATE: 16 BRPM

## 2022-06-30 DIAGNOSIS — R10.2 SUPRAPUBIC ABDOMINAL PAIN: Primary | ICD-10-CM

## 2022-06-30 LAB
APPEARANCE UR: CLEAR
BILIRUB UR QL: NEGATIVE
COLOR UR: YELLOW
GLUCOSE UR STRIP.AUTO-MCNC: NEGATIVE MG/DL
HGB UR QL STRIP: NEGATIVE
KETONES UR QL STRIP.AUTO: NEGATIVE MG/DL
LEUKOCYTE ESTERASE UR QL STRIP.AUTO: NEGATIVE
NITRITE UR QL STRIP.AUTO: NEGATIVE
PH UR STRIP: 7 [PH] (ref 5–9)
PROT UR STRIP-MCNC: NEGATIVE MG/DL
SP GR UR REFRACTOMETRY: 1.02 (ref 1–1.02)
UROBILINOGEN UR QL STRIP.AUTO: 0.2 EU/DL (ref 0.2–1)

## 2022-06-30 PROCEDURE — 81003 URINALYSIS AUTO W/O SCOPE: CPT

## 2022-06-30 PROCEDURE — 99283 EMERGENCY DEPT VISIT LOW MDM: CPT

## 2022-06-30 RX ORDER — PHENAZOPYRIDINE HYDROCHLORIDE 200 MG/1
200 TABLET, FILM COATED ORAL 3 TIMES DAILY PRN
Qty: 6 TABLET | Refills: 0 | Status: SHIPPED | OUTPATIENT
Start: 2022-06-30 | End: 2022-07-02

## 2022-06-30 RX ORDER — CEPHALEXIN 500 MG/1
500 CAPSULE ORAL 2 TIMES DAILY
Qty: 6 CAPSULE | Refills: 0 | Status: SHIPPED | OUTPATIENT
Start: 2022-06-30 | End: 2022-07-03

## 2022-06-30 ASSESSMENT — PAIN DESCRIPTION - PAIN TYPE: TYPE: CHRONIC PAIN

## 2022-06-30 ASSESSMENT — PAIN DESCRIPTION - ORIENTATION: ORIENTATION: LOWER

## 2022-06-30 ASSESSMENT — PAIN SCALES - GENERAL
PAINLEVEL_OUTOF10: 5
PAINLEVEL_OUTOF10: 7

## 2022-06-30 ASSESSMENT — PAIN - FUNCTIONAL ASSESSMENT: PAIN_FUNCTIONAL_ASSESSMENT: 0-10

## 2022-06-30 ASSESSMENT — PAIN DESCRIPTION - ONSET: ONSET: ON-GOING

## 2022-06-30 ASSESSMENT — PAIN DESCRIPTION - LOCATION: LOCATION: ABDOMEN

## 2022-06-30 ASSESSMENT — ENCOUNTER SYMPTOMS: ABDOMINAL PAIN: 1

## 2022-06-30 ASSESSMENT — PAIN DESCRIPTION - FREQUENCY: FREQUENCY: INTERMITTENT

## 2022-07-01 NOTE — ED PROVIDER NOTES
[facility] Emergency Department Provider Note                   PCP:                Monty David MD               Age: 40 y.o. Sex: female       ICD-10-CM    1. Suprapubic abdominal pain  R10.2        DISPOSITION Decision To Discharge 06/30/2022 10:15:09 PM       New Prescriptions    CEPHALEXIN (KEFLEX) 500 MG CAPSULE    Take 1 capsule by mouth 2 times daily for 3 days    PHENAZOPYRIDINE (PYRIDIUM) 200 MG TABLET    Take 1 tablet by mouth 3 times daily as needed for Pain       Orders Placed This Encounter   Procedures    Urinalysis w rflx microscopic        Jacque Kilts, DO 10:16 PM      MDM  Number of Diagnoses or Management Options  Suprapubic abdominal pain  Diagnosis management comments: Abdominal pain, dysuria, STD, vaginal discharge,       Amount and/or Complexity of Data Reviewed  Clinical lab tests: ordered and reviewed         Kiko Dubose is a 40 y.o. female who presents to the Emergency Department with chief complaint of    Chief Complaint   Patient presents with    Abdominal Pain     Lower      Patient is a pleasant 67-year-old female presenting to the emergency department today complaining of some mild suprapubic and left lower quadrant abdominal tenderness which started earlier in the day but is progressively worsened. The patient states she is not had any real urinary frequency or dysuria noted but now it feels like she is developing a urinary tract infection. She denies any vaginal discharge. Patient is not concerned about possible STDs. All other systems reviewed and are negative. Review of Systems   Constitutional: Negative. Gastrointestinal: Positive for abdominal pain. Genitourinary: Negative for difficulty urinating, dysuria, flank pain, frequency and hematuria. Musculoskeletal: Negative. Skin: Negative.         Past Medical History:   Diagnosis Date    Abnormal Pap smear     HGSIL    Abnormal Papanicolaou smear of cervix     Anxiety     Asthma last exacerbation >10yrs. rescue inhaler prn    Cervical dysplasia     H/O seasonal allergies     Other ill-defined conditions(799.89)     chronic acne    PTSD (post-traumatic stress disorder)     Shingles     2 weeks ago    Thyroid disease     hypothyroid. stable w/o med since 6/2012        Past Surgical History:   Procedure Laterality Date    GYN  2013    Cervical dysplasia-LEEP         Family History   Problem Relation Age of Onset    Diabetes Paternal Grandmother     Diabetes Maternal Grandmother     Breast Cancer Other         maternal great aunt    Asthma Mother    [employer] Migraines Mother     Diabetes Mother     Anxiety Disorder Mother     Breast Cancer Maternal Aunt     Cancer Maternal Uncle         lung           Social Connections:     Frequency of Communication with Friends and Family: Not on file    Frequency of Social Gatherings with Friends and Family: Not on file    Attends Pentecostal Services: Not on file    Active Member of Clubs or Organizations: Not on file    Attends Club or Organization Meetings: Not on file    Marital Status: Not on file        Allergies   Allergen Reactions    Fluconazole Hives        Vitals signs and nursing note reviewed. Patient Vitals for the past 4 hrs:   Temp Pulse Resp BP SpO2   06/30/22 2034 99 °F (37.2 °C) 72 16 (!) 131/100 100 %          Physical Exam     GENERAL:The patient has Body mass index is 28.35 kg/m². Well-hydrated. No acute distress. VITAL SIGNS: Heart rate, blood pressure, respiratory rate reviewed as recorded in  nurse's notes  EYES: Pupils reactive. Extraocular motion intact. No conjunctival redness or drainage. LUNGS: No accessory muscle use  CARDIOVASCULAR: Regular rate and rhythm  ABD: Suprapubic and left lower quadrant abdominal tenderness which is mild. No guarding or rigidity present. Positive bowel sounds in all 4 quadrants. No Patricia Sr and Rovsing signs present.   EXTREMITIES: Pt moving all 4 extremities with out limitations. Normal muscle tone. NEUROLOGIC: Cranial nerve exam reveals face is symmetrical, tongue is midline  speech is clear. No focal deficits noted  SKIN: No rash or petechiae. Good skin turgor palpated. PSYCHIATRIC: Alert and oriented. Appropriate behavior and judgment. Procedures      Labs Reviewed   URINALYSIS - Abnormal; Notable for the following components:       Result Value    Specific Gravity, UA 1.025 (*)     All other components within normal limits        No orders to display                      ED Course as of 06/30/22 2216   Thu Jun 30, 2022 2214 Case discussed with the patient. Urinalysis is negative at this time is secondary to new onset of symptoms consistent with her previous urinary tract infection she will be placed on 3 days of antibiotics [KH]      ED Course User Index  [KH] Jose Antonio Rios DO        Voice dictation software was used during the making of this note. This software is not perfect and grammatical and other typographical errors may be present. This note has not been completely proofread for errors.        Jose Antonio Rios DO  06/30/22 2157       Jose Antonio Rios DO  06/30/22 2216

## 2022-07-01 NOTE — ED TRIAGE NOTES
Patient educated to use artificial tears at least 4 times a day. We will schedule repeat measurements prior to surgery. Pt states she has a hx of a liver mass. Pt wants to be checked to make sure nothing is going on due to pain when she bends over to pick something up.

## 2022-07-01 NOTE — ED NOTES
I have reviewed discharge instructions with the patient. The patient verbalized understanding. Patient left ED via Discharge Method: ambulatory to Home with ( self). Opportunity for questions and clarification provided. Patient given 2 scripts. To continue your aftercare when you leave the hospital, you may receive an automated call from our care team to check in on how you are doing. This is a free service and part of our promise to provide the best care and service to meet your aftercare needs.  If you have questions, or wish to unsubscribe from this service please call [phone]. Thank you for Choosing our [facility] Emergency Department.       Viola Brooks RN  06/30/22 7238

## 2022-07-01 NOTE — ED NOTES
Pt c/o flank pain/abd pain.   States that the pain is worse whenever she bends down     David Cheng RN  06/30/22 8529

## 2022-08-23 ENCOUNTER — HOSPITAL ENCOUNTER (EMERGENCY)
Age: 37
Discharge: HOME | End: 2022-08-23
Attending: EMERGENCY MEDICINE
Payer: COMMERCIAL

## 2022-08-23 VITALS
RESPIRATION RATE: 19 BRPM | DIASTOLIC BLOOD PRESSURE: 109 MMHG | HEART RATE: 73 BPM | SYSTOLIC BLOOD PRESSURE: 162 MMHG | BODY MASS INDEX: 27.6 KG/M2 | OXYGEN SATURATION: 100 % | HEIGHT: 62 IN | WEIGHT: 150 LBS | TEMPERATURE: 98.6 F

## 2022-08-23 DIAGNOSIS — H60.502 ACUTE OTITIS EXTERNA OF LEFT EAR, UNSPECIFIED TYPE: Primary | ICD-10-CM

## 2022-08-23 PROCEDURE — 99281 EMR DPT VST MAYX REQ PHY/QHP: CPT

## 2022-08-23 PROCEDURE — 99282 EMERGENCY DEPT VISIT SF MDM: CPT

## 2022-08-23 RX ORDER — NEOMYCIN SULFATE, POLYMYXIN B SULFATE AND HYDROCORTISONE 10; 3.5; 1 MG/ML; MG/ML; [USP'U]/ML
4 SUSPENSION/ DROPS AURICULAR (OTIC) EVERY 6 HOURS SCHEDULED
Status: DISCONTINUED | OUTPATIENT
Start: 2022-08-23 | End: 2022-08-23 | Stop reason: HOSPADM

## 2022-08-23 RX ORDER — ACETAMINOPHEN 500 MG
1000 TABLET ORAL
Status: DISCONTINUED | OUTPATIENT
Start: 2022-08-23 | End: 2022-08-23 | Stop reason: HOSPADM

## 2022-08-23 ASSESSMENT — ENCOUNTER SYMPTOMS
PHOTOPHOBIA: 0
SHORTNESS OF BREATH: 0
RHINORRHEA: 0
ABDOMINAL PAIN: 0
VOMITING: 0
NAUSEA: 0
SORE THROAT: 1
COUGH: 0
FACIAL SWELLING: 0
VOICE CHANGE: 0
EYE REDNESS: 0
TROUBLE SWALLOWING: 0

## 2022-08-23 ASSESSMENT — PAIN DESCRIPTION - ORIENTATION: ORIENTATION: LEFT

## 2022-08-23 ASSESSMENT — PAIN DESCRIPTION - LOCATION: LOCATION: EAR

## 2022-08-23 ASSESSMENT — PAIN SCALES - GENERAL: PAINLEVEL_OUTOF10: 10

## 2022-08-23 ASSESSMENT — PAIN - FUNCTIONAL ASSESSMENT: PAIN_FUNCTIONAL_ASSESSMENT: 0-10

## 2022-08-23 NOTE — ED PROVIDER NOTES
[facility] Emergency Department Provider Note                   PCP:                Joe Patrick MD               Age: 40 y.o. Sex: female       ICD-10-CM    1. Acute otitis externa of left ear, unspecified type  H60.502           DISPOSITION Decision To Discharge 08/23/2022 05:04:30 AM        MDM  Number of Diagnoses or Management Options  Acute otitis externa of left ear, unspecified type: new, needed workup  Diagnosis management comments: Afebrile. Will obtain rapid COVID swab, strep swab. Tylenol ordered. Urine pregnancy test pending. Patient with symptoms consistent with otitis externa of left ear canal.  Discussed with pharmacist, only otic drops available or polymyxin/neomycin/hydrocortisone drops. Have ordered for patient to receive here.  =============================================  Patient reportedly eloped as she was unhappy for having to wait as nurses were unable to swab her or treat her with otic drops or Tylenol. Patient was to be given drops in ER prior to discharge so that she can take them home and use them. Was informed that patient eloped around 20 minutes after she left the ER. Amount and/or Complexity of Data Reviewed  Clinical lab tests: reviewed and ordered  Tests in the medicine section of CPT®: ordered and reviewed  Review and summarize past medical records: yes  Independent visualization of images, tracings, or specimens: yes    Risk of Complications, Morbidity, and/or Mortality  Presenting problems: low  Diagnostic procedures: low  Management options: low    Patient Progress  Patient progress: stable       Orders Placed This Encounter   Procedures    COVID-19, Rapid    Group A Strep Screen By PCR    Pregnancy, Urine        Halley Charles is a 40 y.o. female who presents to the Emergency Department with chief complaint of  L ear pain.     Chief Complaint   Patient presents with    Otalgia      36-year-old female with history of hypothyroidism presents with complaint of mild sore throat, left ear pain that she woke up with around midnight this evening. States that she worked on the yard on Saturday and has a history of bad seasonal allergies. Uncertain of any recent sick contacts. Denies cough, shortness of breath, fever, chills, neck pain, nausea, vomiting abdominal pain. States last menstrual cycle was August 4. Denies myalgias, neck pain. States that she attempted over-the-counter pain medication prior to arrival.  Rates pain as moderate to left ear. Denies radiation of pain. States that hearing seems decreased. States that she swam around a week ago and has had otitis externa in the past.  States that her hearing is slightly decreased. Denies vision changes or symptoms. Describes pain as sharp. Denies any alleviating factors. States that pain is exacerbated with palpating and pulling on affected ear. Describes pain is constant. The history is provided by the patient. No  was used. Review of Systems   Constitutional:  Negative for chills, fatigue and fever. HENT:  Positive for ear pain and sore throat. Negative for congestion, dental problem, ear discharge, facial swelling, rhinorrhea, trouble swallowing and voice change. Eyes:  Negative for photophobia and redness. Respiratory:  Negative for cough and shortness of breath. Cardiovascular:  Negative for chest pain. Gastrointestinal:  Negative for abdominal pain, nausea and vomiting. Genitourinary:  Negative for dysuria and flank pain. Musculoskeletal:  Negative for myalgias, neck pain and neck stiffness. Skin:  Negative for rash. Neurological:  Negative for dizziness, weakness, light-headedness and headaches. Hematological:  Does not bruise/bleed easily. Past Medical History:   Diagnosis Date    Abnormal Pap smear     HGSIL    Abnormal Papanicolaou smear of cervix     Anxiety     Asthma     last exacerbation >10yrs.  rescue inhaler prn    Cervical dysplasia     H/O seasonal allergies     Other ill-defined conditions(799.89)     chronic acne    PTSD (post-traumatic stress disorder)     Shingles     2 weeks ago    Thyroid disease     hypothyroid. stable w/o med since 6/2012        Past Surgical History:   Procedure Laterality Date    GYN  2013    Cervical dysplasia-LEEP         Family History   Problem Relation Age of Onset    Diabetes Paternal Grandmother     Diabetes Maternal Grandmother     Breast Cancer Other         maternal great aunt    Asthma Mother     Migraines Mother     Diabetes Mother     Anxiety Disorder Mother     Breast Cancer Maternal Aunt     Cancer Maternal Uncle         lung        Social History     Socioeconomic History    Marital status: Single   Tobacco Use    Smoking status: Never    Smokeless tobacco: Never   Substance and Sexual Activity    Alcohol use: Yes    Drug use: No   Social History Narrative    Abuse: Feels safe at home, no history of physical abuse, no history of sexual abuse         Fluconazole     Previous Medications    ALBUTEROL SULFATE  (90 BASE) MCG/ACT INHALER    Inhale 2 puffs into the lungs every 6 hours as needed    ETONOGESTREL-ETHINYL ESTRADIOL (NUVARING) 0.12-0.015 MG/24HR VAGINAL RING    Place ring intravaginally for 3 weeks q month, leave out for one week    FEXOFENADINE (ALLEGRA) 180 MG TABLET    Take 180 mg by mouth daily    FLUTICASONE (FLONASE) 50 MCG/ACT NASAL SPRAY    2 sprays by Nasal route daily    LEVOTHYROXINE (SYNTHROID) 25 MCG TABLET    Take 25 mcg by mouth every morning (before breakfast)    LIDOCAINE (LIDODERM) 5 %    Apply patch to the affected area for 12 hours a day and remove for 12 hours a day. PROPRANOLOL (INDERAL) 20 MG TABLET    Take 20 mg by mouth 3 times daily    SUMATRIPTAN (IMITREX) 50 MG TABLET    Take 50 mg by mouth    TIZANIDINE (ZANAFLEX) 4 MG TABLET    Take 4 mg by mouth        Vitals signs and nursing note reviewed.    Patient Vitals for the past 4 hrs:   Temp Pulse Resp BP SpO2   08/23/22 0443 98.6 °F (37 °C) 73 19 (!) 162/109 100 %          Physical Exam  Vitals and nursing note reviewed. Constitutional:       Appearance: Normal appearance. HENT:      Head: Normocephalic. Right Ear: Tympanic membrane and ear canal normal.      Left Ear: Tympanic membrane normal.      Ears:      Comments: Erythema and inflammation noted to left ear canal.  No purulent drainage noted. No mastoid process tenderness noted. Left TM with no significant bulging or erythema. Nose: Nose normal. No congestion. Mouth/Throat:      Mouth: Mucous membranes are moist.      Pharynx: No oropharyngeal exudate or posterior oropharyngeal erythema. Comments: Uvula midline. No tonsillar exudate noted. No trismus. No stridor. No posterior oropharynx edema or swelling noted. Patient tolerating secretions. No muffled voice. Eyes:      Extraocular Movements: Extraocular movements intact. Pupils: Pupils are equal, round, and reactive to light. Neck:      Comments: No nuchal rigidity. Cardiovascular:      Rate and Rhythm: Normal rate. Pulses: Normal pulses. Heart sounds: Normal heart sounds. Pulmonary:      Effort: Pulmonary effort is normal.      Breath sounds: Normal breath sounds. Comments: CTAB. Abdominal:      General: Bowel sounds are normal.      Palpations: Abdomen is soft. Tenderness: There is no abdominal tenderness. There is no guarding or rebound. Musculoskeletal:         General: No swelling. Normal range of motion. Cervical back: Normal range of motion. No rigidity. Skin:     General: Skin is warm. Findings: No erythema or rash. Neurological:      General: No focal deficit present. Mental Status: She is alert and oriented to person, place, and time. Cranial Nerves: No cranial nerve deficit. Sensory: No sensory deficit. Motor: No weakness. Comments: No focal deficits. No meningismus. Procedures      Labs Reviewed   COVID-19, RAPID   GROUP A STREP SCREEN BY PCR   PREGNANCY, URINE        No orders to display                          Voice dictation software was used during the making of this note. This software is not perfect and grammatical and other typographical errors may be present. This note has not been completely proofread for errors.      Addy White MD  08/23/22 8689       Addy White MD  08/23/22 6628

## 2022-08-23 NOTE — DISCHARGE INSTRUCTIONS
Use eardrops as directed. Schedule close follow-up with primary care physician. Return if symptoms worsen or progress in any way.

## 2022-10-17 ENCOUNTER — OFFICE VISIT (OUTPATIENT)
Dept: INTERNAL MEDICINE CLINIC | Facility: CLINIC | Age: 37
End: 2022-10-17
Payer: COMMERCIAL

## 2022-10-17 ENCOUNTER — PATIENT MESSAGE (OUTPATIENT)
Dept: INTERNAL MEDICINE CLINIC | Facility: CLINIC | Age: 37
End: 2022-10-17

## 2022-10-17 VITALS
BODY MASS INDEX: 28.52 KG/M2 | RESPIRATION RATE: 18 BRPM | WEIGHT: 155 LBS | HEART RATE: 83 BPM | DIASTOLIC BLOOD PRESSURE: 66 MMHG | HEIGHT: 62 IN | OXYGEN SATURATION: 98 % | SYSTOLIC BLOOD PRESSURE: 112 MMHG

## 2022-10-17 DIAGNOSIS — D18.03 LIVER HEMANGIOMA: Primary | ICD-10-CM

## 2022-10-17 DIAGNOSIS — E03.9 ACQUIRED HYPOTHYROIDISM: ICD-10-CM

## 2022-10-17 DIAGNOSIS — Z23 ENCOUNTER FOR VACCINATION: ICD-10-CM

## 2022-10-17 DIAGNOSIS — Z00.00 ANNUAL PHYSICAL EXAM: Primary | ICD-10-CM

## 2022-10-17 DIAGNOSIS — Z00.00 ANNUAL PHYSICAL EXAM: ICD-10-CM

## 2022-10-17 DIAGNOSIS — F41.9 ANXIETY: ICD-10-CM

## 2022-10-17 PROBLEM — R41.89 SLUGGISHNESS: Status: RESOLVED | Noted: 2017-10-27 | Resolved: 2022-10-17

## 2022-10-17 PROBLEM — E01.0 THYROMEGALY: Status: RESOLVED | Noted: 2017-06-07 | Resolved: 2022-10-17

## 2022-10-17 PROBLEM — Z30.09 CONTRACEPTIVE EDUCATION: Status: RESOLVED | Noted: 2019-02-15 | Resolved: 2022-10-17

## 2022-10-17 PROBLEM — M54.9 UPPER BACK PAIN: Status: RESOLVED | Noted: 2018-08-21 | Resolved: 2022-10-17

## 2022-10-17 PROBLEM — E06.9 THYROIDITIS: Status: RESOLVED | Noted: 2017-10-27 | Resolved: 2022-10-17

## 2022-10-17 PROBLEM — N64.4 MASTALGIA: Status: RESOLVED | Noted: 2019-02-15 | Resolved: 2022-10-17

## 2022-10-17 PROBLEM — M54.2 PAIN IN THE NECK: Status: RESOLVED | Noted: 2017-06-07 | Resolved: 2022-10-17

## 2022-10-17 PROBLEM — T14.8XXA MUSCLE STRAIN: Status: RESOLVED | Noted: 2018-08-21 | Resolved: 2022-10-17

## 2022-10-17 PROCEDURE — 90471 IMMUNIZATION ADMIN: CPT | Performed by: INTERNAL MEDICINE

## 2022-10-17 PROCEDURE — 90674 CCIIV4 VAC NO PRSV 0.5 ML IM: CPT | Performed by: INTERNAL MEDICINE

## 2022-10-17 PROCEDURE — 99385 PREV VISIT NEW AGE 18-39: CPT | Performed by: INTERNAL MEDICINE

## 2022-10-17 RX ORDER — ERGOCALCIFEROL (VITAMIN D2) 200 MCG/ML
1 DROPS ORAL
COMMUNITY

## 2022-10-17 RX ORDER — KETOCONAZOLE 20 MG/ML
SHAMPOO TOPICAL
COMMUNITY
Start: 2022-07-20

## 2022-10-17 RX ORDER — ESCITALOPRAM OXALATE 10 MG/1
10 TABLET ORAL DAILY
COMMUNITY
Start: 2022-04-18

## 2022-10-17 RX ORDER — VALACYCLOVIR HYDROCHLORIDE 1 G/1
1000 TABLET, FILM COATED ORAL DAILY
COMMUNITY
Start: 2022-01-27

## 2022-10-17 ASSESSMENT — PATIENT HEALTH QUESTIONNAIRE - PHQ9
SUM OF ALL RESPONSES TO PHQ QUESTIONS 1-9: 0
1. LITTLE INTEREST OR PLEASURE IN DOING THINGS: 0
SUM OF ALL RESPONSES TO PHQ QUESTIONS 1-9: 0
2. FEELING DOWN, DEPRESSED OR HOPELESS: 0
SUM OF ALL RESPONSES TO PHQ9 QUESTIONS 1 & 2: 0

## 2022-10-17 ASSESSMENT — ENCOUNTER SYMPTOMS
CHEST TIGHTNESS: 0
ABDOMINAL PAIN: 0
SHORTNESS OF BREATH: 0
CONSTIPATION: 0
COUGH: 0
ABDOMINAL DISTENTION: 0

## 2022-10-17 ASSESSMENT — ANXIETY QUESTIONNAIRES
4. TROUBLE RELAXING: 1
3. WORRYING TOO MUCH ABOUT DIFFERENT THINGS: 1
6. BECOMING EASILY ANNOYED OR IRRITABLE: 1
5. BEING SO RESTLESS THAT IT IS HARD TO SIT STILL: 3
GAD7 TOTAL SCORE: 7
7. FEELING AFRAID AS IF SOMETHING AWFUL MIGHT HAPPEN: 1
1. FEELING NERVOUS, ANXIOUS, OR ON EDGE: 0
IF YOU CHECKED OFF ANY PROBLEMS ON THIS QUESTIONNAIRE, HOW DIFFICULT HAVE THESE PROBLEMS MADE IT FOR YOU TO DO YOUR WORK, TAKE CARE OF THINGS AT HOME, OR GET ALONG WITH OTHER PEOPLE: NOT DIFFICULT AT ALL
2. NOT BEING ABLE TO STOP OR CONTROL WORRYING: 0
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
## 2022-10-17 NOTE — ASSESSMENT & PLAN NOTE
Unclear control, Will repeat labs, she is currently off of the Synthroid, will continue to monitor we will discuss labs after available results

## 2022-10-17 NOTE — ASSESSMENT & PLAN NOTE
Discussed physiopathology of depression and anxiety , also the role of relaxation and possitive activities , exercise, pyschotherapy, counseling services   She has been taking Lexapro on and off, we discussed about importance of taking this medications daily, she will continue with counseling services.

## 2022-10-18 NOTE — TELEPHONE ENCOUNTER
From: Heidy Moss  To: Dr. Garcia Batter: 10/17/2022 6:30 PM EDT  Subject: Jyoti Crease there was something I did forgot to mention. I have a mass on my liver that I would like to get recheck. I found out about this mass in April I am wanting to do a follow up to check if it is still present, shrunk or gone all together. I did a US and then a CT scan. Would one or both be able to be order?

## 2022-10-18 NOTE — TELEPHONE ENCOUNTER
Records reviewed ct 4 26 22  7.3 cm hemangioma centered within hepatic segment 8.      Will suggest repeat CT now , No US, order in place

## 2022-10-25 ENCOUNTER — HOSPITAL ENCOUNTER (OUTPATIENT)
Dept: CT IMAGING | Age: 37
Discharge: HOME | End: 2022-10-27
Payer: COMMERCIAL

## 2022-10-25 DIAGNOSIS — Z00.00 ROUTINE GENERAL MEDICAL EXAMINATION AT A HEALTH CARE FACILITY: ICD-10-CM

## 2022-10-25 DIAGNOSIS — D18.03 LIVER HEMANGIOMA: ICD-10-CM

## 2022-10-25 DIAGNOSIS — Z00.00 ROUTINE GENERAL MEDICAL EXAMINATION AT A HEALTH CARE FACILITY: Primary | ICD-10-CM

## 2022-10-25 PROCEDURE — 2580000003 HC RX 258: Performed by: INTERNAL MEDICINE

## 2022-10-25 PROCEDURE — 74170 CT ABD WO CNTRST FLWD CNTRST: CPT

## 2022-10-25 PROCEDURE — 6360000004 HC RX CONTRAST MEDICATION: Performed by: INTERNAL MEDICINE

## 2022-10-25 RX ORDER — 0.9 % SODIUM CHLORIDE 0.9 %
100 INTRAVENOUS SOLUTION INTRAVENOUS ONCE
Status: COMPLETED | OUTPATIENT
Start: 2022-10-25 | End: 2022-10-25

## 2022-10-25 RX ORDER — SODIUM CHLORIDE 0.9 % (FLUSH) 0.9 %
5-40 SYRINGE (ML) INJECTION 2 TIMES DAILY
Status: DISCONTINUED | OUTPATIENT
Start: 2022-10-25 | End: 2022-10-28 | Stop reason: HOSPADM

## 2022-10-25 RX ADMIN — DIATRIZOATE MEGLUMINE AND DIATRIZOATE SODIUM 15 ML: 660; 100 LIQUID ORAL; RECTAL at 15:52

## 2022-10-25 RX ADMIN — SODIUM CHLORIDE 100 ML: 9 INJECTION, SOLUTION INTRAVENOUS at 15:53

## 2022-10-25 RX ADMIN — IOPAMIDOL 100 ML: 755 INJECTION, SOLUTION INTRAVENOUS at 15:53

## 2022-10-26 LAB
ALBUMIN SERPL-MCNC: 4.3 G/DL (ref 3.5–5)
ALBUMIN/GLOB SERPL: 1.1 {RATIO} (ref 0.4–1.6)
ALP SERPL-CCNC: 61 U/L (ref 50–136)
ALT SERPL-CCNC: 32 U/L (ref 12–65)
ANION GAP SERPL CALC-SCNC: 9 MMOL/L (ref 2–11)
AST SERPL-CCNC: 17 U/L (ref 15–37)
BASOPHILS # BLD: 0 K/UL (ref 0–0.2)
BASOPHILS NFR BLD: 1 % (ref 0–2)
BILIRUB SERPL-MCNC: 0.5 MG/DL (ref 0.2–1.1)
BUN SERPL-MCNC: 12 MG/DL (ref 6–23)
CALCIUM SERPL-MCNC: 9.6 MG/DL (ref 8.3–10.4)
CHLORIDE SERPL-SCNC: 102 MMOL/L (ref 101–110)
CHOLEST SERPL-MCNC: 249 MG/DL
CO2 SERPL-SCNC: 25 MMOL/L (ref 21–32)
CREAT SERPL-MCNC: 0.8 MG/DL (ref 0.6–1)
DIFFERENTIAL METHOD BLD: ABNORMAL
EOSINOPHIL # BLD: 0.1 K/UL (ref 0–0.8)
EOSINOPHIL NFR BLD: 1 % (ref 0.5–7.8)
ERYTHROCYTE [DISTWIDTH] IN BLOOD BY AUTOMATED COUNT: 13.5 % (ref 11.9–14.6)
GLOBULIN SER CALC-MCNC: 3.9 G/DL (ref 2.8–4.5)
GLUCOSE SERPL-MCNC: 69 MG/DL (ref 65–100)
HCT VFR BLD AUTO: 46.7 % (ref 35.8–46.3)
HDLC SERPL-MCNC: 73 MG/DL (ref 40–60)
HDLC SERPL: 3.4 {RATIO}
HGB BLD-MCNC: 14.3 G/DL (ref 11.7–15.4)
IMM GRANULOCYTES # BLD AUTO: 0 K/UL (ref 0–0.5)
IMM GRANULOCYTES NFR BLD AUTO: 0 % (ref 0–5)
LDLC SERPL CALC-MCNC: 137.2 MG/DL
LYMPHOCYTES # BLD: 1.8 K/UL (ref 0.5–4.6)
LYMPHOCYTES NFR BLD: 28 % (ref 13–44)
MCH RBC QN AUTO: 27.6 PG (ref 26.1–32.9)
MCHC RBC AUTO-ENTMCNC: 30.6 G/DL (ref 31.4–35)
MCV RBC AUTO: 90.2 FL (ref 82–102)
MONOCYTES # BLD: 0.3 K/UL (ref 0.1–1.3)
MONOCYTES NFR BLD: 5 % (ref 4–12)
NEUTS SEG # BLD: 4.2 K/UL (ref 1.7–8.2)
NEUTS SEG NFR BLD: 64 % (ref 43–78)
NRBC # BLD: 0 K/UL (ref 0–0.2)
PLATELET # BLD AUTO: 232 K/UL (ref 150–450)
PMV BLD AUTO: 12.1 FL (ref 9.4–12.3)
POTASSIUM SERPL-SCNC: 4.1 MMOL/L (ref 3.5–5.1)
PROT SERPL-MCNC: 8.2 G/DL (ref 6.3–8.2)
RBC # BLD AUTO: 5.18 M/UL (ref 4.05–5.2)
SODIUM SERPL-SCNC: 136 MMOL/L (ref 133–143)
T4 FREE SERPL-MCNC: 1 NG/DL (ref 0.78–1.46)
TRIGL SERPL-MCNC: 194 MG/DL (ref 35–150)
TSH, 3RD GENERATION: 0.72 UIU/ML (ref 0.36–3.74)
VLDLC SERPL CALC-MCNC: 38.8 MG/DL (ref 6–23)
WBC # BLD AUTO: 6.5 K/UL (ref 4.3–11.1)

## 2022-10-27 LAB
T3FREE SERPL-MCNC: 2.3 PG/ML (ref 2–4.4)
THYROPEROXIDASE AB SERPL-ACNC: <8 IU/ML (ref 0–34)

## 2022-11-01 ENCOUNTER — TELEMEDICINE (OUTPATIENT)
Dept: INTERNAL MEDICINE CLINIC | Facility: CLINIC | Age: 37
End: 2022-11-01
Payer: COMMERCIAL

## 2022-11-01 DIAGNOSIS — D18.03 LIVER HEMANGIOMA: Primary | ICD-10-CM

## 2022-11-01 DIAGNOSIS — E03.8 SUBCLINICAL HYPOTHYROIDISM: ICD-10-CM

## 2022-11-01 DIAGNOSIS — E78.2 MIXED HYPERLIPIDEMIA: ICD-10-CM

## 2022-11-01 PROBLEM — E03.9 ACQUIRED HYPOTHYROIDISM: Status: RESOLVED | Noted: 2017-10-17 | Resolved: 2022-11-01

## 2022-11-01 PROCEDURE — 99214 OFFICE O/P EST MOD 30 MIN: CPT | Performed by: INTERNAL MEDICINE

## 2022-11-01 ASSESSMENT — ENCOUNTER SYMPTOMS
COUGH: 0
SHORTNESS OF BREATH: 0
ABDOMINAL PAIN: 0
CHEST TIGHTNESS: 0
ABDOMINAL DISTENTION: 0
CONSTIPATION: 0

## 2022-11-01 NOTE — PROGRESS NOTES
Chery Briceño (:  1985) is a Established patient, here for evaluation of the following:    Assessment & Plan   Below is the assessment and plan developed based on review of pertinent history, physical exam, labs, studies, and medications. 1. Liver hemangioma  2. Subclinical hypothyroidism  3. Mixed hyperlipidemia    Referral to gastroenterology has been placed, will follow up in December with them, we discussed about relationship with hormonal therapy, she will continue with dietary changes, lifestyle modification, avoiding hookah, and alcohol, try to exercise,  Her thyroid has been well controlled, not on medication, we will continue to monitor every 6 to 12 months. We discussed about dietary changes, to try to cut down the triglycerides, and cholesterol,  Return in about 1 year (around 2023) for annual.       Subjective   Follow up in recent labs and CT scan    Her labs are showing normal thyroid function test, despite of not taking levothyroxine for few months,  Negative TPO, normal T3-T4, and she feels since has been off of the levothyroxine her anxiety has been better. Her cholesterol is showing abnormalities, we will discuss the results today,  And her CT scan here showing the hemangioma, based on previous results, has been described as 6.8 cm instead of 7.3, she has an appointment scheduled with GI for follow-up, she reports beginning contraceptive methods NuvaRing, and is planning to see gynecologist to discuss other options.       Lab Results       Component                Value               Date                       WBC                      6.5                 10/25/2022                 HGB                      14.3                10/25/2022                 HCT                      46.7 (H)            10/25/2022                 MCV                      90.2                10/25/2022                 PLT                      232                 10/25/2022            Lab Results Component                Value               Date                       NA                       136                 10/25/2022                 K                        4.1                 10/25/2022                 CL                       102                 10/25/2022                 CO2                      25                  10/25/2022                 BUN                      12                  10/25/2022                 CREATININE               0.80                10/25/2022                 GLUCOSE                  69                  10/25/2022                 CALCIUM                  9.6                 10/25/2022                 PROT                     8.2                 10/25/2022                 LABALBU                  4.3                 10/25/2022                 BILITOT                  0.5                 10/25/2022                 ALKPHOS                  61                  10/25/2022                 AST                      17                  10/25/2022                 ALT                      32                  10/25/2022                 LABGLOM                  >60                 10/25/2022                 GFRAA                    133                 08/03/2020                 AGRATIO                  1.7                 08/03/2020                 GLOB                     3.9                 10/25/2022                Lab Results       Component                Value               Date                       TSH                      0.832               08/03/2020                 [id number]                  0.720               10/25/2022            Lab Results       Component                Value               Date                       CHOL                     249 (H)             10/25/2022            Lab Results       Component                Value               Date                       TRIG                     194 (H)             10/25/2022            Lab Results       Component Value               Date                       HDL                      73 (H)              10/25/2022            Lab Results       Component                Value               Date                       LDLCALC                  137.2 (H)           10/25/2022            Lab Results       Component                Value               Date                       LABVLDL                  38.8 (H)            10/25/2022            Lab Results       Component                Value               Date                       CHOLHDLRATIO             3.4                 10/25/2022              CT Result (most recent):  CT ABDOMEN W WO IV CONTRAST 10/25/2022    Narrative  CT ABDOMEN W WO CONTRAST 10/25/2022 3:54 PM    HISTORY: Hemangioma of intra-abdominal structures    COMPARISON: None available. TECHNIQUE: Following intravenous contrast administration, axial images are  performed during hepatic arterial and portal venous phases. Additional delayed  axial images are obtained through the abdomen. Reformatted images are also  generated. 100 mL Isovue-370. Radiation dose for this scan was reduced using automated exposure control,  adjustment of the mA and/or kVp according to patient size or iterative  reconstruction technique. FINDINGS:    ABDOMEN: Lung bases are clear. There is a large hypodense mass with peripheral  nodular enhancement in segment 8 of the liver measuring 6.8 x 4.6 cm. This  demonstrates increasing peripheral enhancement which progresses more centrally  on delayed images. This is most consistent with a giant cavernous hemangioma. Additional small lateral segment left hepatic lobe hemangioma is present on  image 12 of series 3 measuring 1.6 cm. Liver is otherwise unremarkable. Gallbladder is unremarkable. The pancreas, spleen, adrenal glands and kidneys are unremarkable. No  hydronephrosis or renal calculi. No renal cyst or mass. No enlarged abdominal  lymph nodes.  Vascular structures are within normal limits. Imaged portions of  bowel are nondistended. Bone window examination is unremarkable. Impression  1. Giant cavernous hemangioma segment 8 of the liver measuring up to 6.8 cm. Additional small 1.6 cm lateral segment left hepatic lobe hemangioma also  present. No other liver lesions are identified. Review of Systems   Constitutional:  Negative for activity change, chills, fatigue and fever. Respiratory:  Negative for cough, chest tightness and shortness of breath. Cardiovascular:  Negative for chest pain. Gastrointestinal:  Negative for abdominal distention, abdominal pain and constipation. Neurological:  Positive for headaches. Negative for dizziness. Psychiatric/Behavioral:  The patient is not nervous/anxious. Objective   Patient-Reported Vitals  No data recorded     Physical Exam  Vitals reviewed. Constitutional:       Appearance: Normal appearance. Pulmonary:      Effort: Pulmonary effort is normal.   Neurological:      General: No focal deficit present. Mental Status: She is alert and oriented to person, place, and time. Psychiatric:         Mood and Affect: Mood normal.            On this date 11/1/2022 I have spent 20 minutes reviewing previous notes, test rsults and face to face (virtual) with the patient discussing the diagnosis and importance of compliance with the treatment plan as well as documenting on the day of the visit. Deidra Huang, was evaluated through a synchronous (real-time) audio-video encounter. The patient (or guardian if applicable) is aware that this is a billable service, which includes applicable co-pays. This Virtual Visit was conducted with patient's (and/or legal guardian's) consent. The visit was conducted pursuant to the emergency declaration under the [address] [place] [address], [address] [place] [address] authority and the [employer] and [employer] General Act.   Patient identification was verified, and a caregiver was present when appropriate. The patient was located at Home: [address] [place] [address]. Provider was located at [facility] (Appt Dept): [address] [place] [address],  [address] [place] [address].         --Mil Barnes MD

## 2023-02-22 ENCOUNTER — NURSE TRIAGE (OUTPATIENT)
Dept: OTHER | Facility: CLINIC | Age: 38
End: 2023-02-22

## 2023-02-22 NOTE — TELEPHONE ENCOUNTER
Location of patient: [place]    Received call from Allan Louie [id number] at [facility] with Red Flag Complaint. Subjective: Caller states \"diagnosed with covid a week ago, and symptoms are worsening\"     Current Symptoms: cough, sob, abd pain, chest pain with cough    Onset: 5 days ago; worsening    Associated Symptoms: reduced appetite    Pain Severity: 5/10; sharp; constant    Temperature: denies     What has been tried: denies    LMP: NA Pregnant: No    Recommended disposition: Go to ED Now    Care advice provided, patient verbalizes understanding; denies any other questions or concerns; instructed to call back for any new or worsening symptoms. Patient/caller agrees to proceed to nearest [facility] , pt states she will probably not follow recommendation and be seen in [facility]    Attention Provider: Thank you for allowing me to participate in the care of your patient. The patient was connected to triage in response to information provided to the ECC/PSC. Please do not respond through this encounter as the response is not directed to a shared pool.         Reason for Disposition   [1] MODERATE difficulty breathing (e.g., speaks in phrases, SOB even at rest, pulse 100-120) AND [2] still present when not coughing    Protocols used: Cough - Acute Productive-ADULT-AH

## 2023-06-23 ENCOUNTER — HOSPITAL ENCOUNTER (OUTPATIENT)
Age: 38
Discharge: HOME | End: 2023-06-23
Payer: COMMERCIAL

## 2023-06-23 DIAGNOSIS — D18.03 HEMANGIOMA OF INTRA-ABDOMINAL STRUCTURES: ICD-10-CM

## 2023-06-23 PROCEDURE — 74183 MRI ABD W/O CNTR FLWD CNTR: CPT

## 2023-06-23 PROCEDURE — A9579 GAD-BASE MR CONTRAST NOS,1ML: HCPCS | Performed by: PHYSICIAN ASSISTANT

## 2023-06-23 PROCEDURE — 6360000004 HC RX CONTRAST MEDICATION: Performed by: PHYSICIAN ASSISTANT

## 2023-06-23 RX ADMIN — GADOTERIDOL 15 ML: 279.3 INJECTION, SOLUTION INTRAVENOUS at 09:14

## 2023-07-13 SDOH — ECONOMIC STABILITY: TRANSPORTATION INSECURITY
IN THE PAST 12 MONTHS, HAS LACK OF TRANSPORTATION KEPT YOU FROM MEETINGS, WORK, OR FROM GETTING THINGS NEEDED FOR DAILY LIVING?: NO

## 2023-07-13 SDOH — ECONOMIC STABILITY: FOOD INSECURITY: WITHIN THE PAST 12 MONTHS, THE FOOD YOU BOUGHT JUST DIDN'T LAST AND YOU DIDN'T HAVE MONEY TO GET MORE.: NEVER TRUE

## 2023-07-13 SDOH — ECONOMIC STABILITY: INCOME INSECURITY: HOW HARD IS IT FOR YOU TO PAY FOR THE VERY BASICS LIKE FOOD, HOUSING, MEDICAL CARE, AND HEATING?: NOT HARD AT ALL

## 2023-07-13 SDOH — ECONOMIC STABILITY: FOOD INSECURITY: WITHIN THE PAST 12 MONTHS, YOU WORRIED THAT YOUR FOOD WOULD RUN OUT BEFORE YOU GOT MONEY TO BUY MORE.: NEVER TRUE

## 2023-07-13 SDOH — ECONOMIC STABILITY: HOUSING INSECURITY
IN THE LAST 12 MONTHS, WAS THERE A TIME WHEN YOU DID NOT HAVE A STEADY PLACE TO SLEEP OR SLEPT IN A SHELTER (INCLUDING NOW)?: NO

## 2023-07-13 ASSESSMENT — PATIENT HEALTH QUESTIONNAIRE - PHQ9
2. FEELING DOWN, DEPRESSED OR HOPELESS: SEVERAL DAYS
1. LITTLE INTEREST OR PLEASURE IN DOING THINGS: NOT AT ALL
SUM OF ALL RESPONSES TO PHQ QUESTIONS 1-9: 1
SUM OF ALL RESPONSES TO PHQ9 QUESTIONS 1 & 2: 1
SUM OF ALL RESPONSES TO PHQ QUESTIONS 1-9: 1
1. LITTLE INTEREST OR PLEASURE IN DOING THINGS: 0
2. FEELING DOWN, DEPRESSED OR HOPELESS: 1
SUM OF ALL RESPONSES TO PHQ QUESTIONS 1-9: 1
SUM OF ALL RESPONSES TO PHQ9 QUESTIONS 1 & 2: 1
SUM OF ALL RESPONSES TO PHQ QUESTIONS 1-9: 1

## 2023-07-14 ENCOUNTER — OFFICE VISIT (OUTPATIENT)
Dept: INTERNAL MEDICINE CLINIC | Facility: CLINIC | Age: 38
End: 2023-07-14
Payer: COMMERCIAL

## 2023-07-14 VITALS
BODY MASS INDEX: 29 KG/M2 | HEIGHT: 62 IN | DIASTOLIC BLOOD PRESSURE: 80 MMHG | HEART RATE: 80 BPM | SYSTOLIC BLOOD PRESSURE: 120 MMHG | WEIGHT: 157.6 LBS

## 2023-07-14 DIAGNOSIS — Z86.2 HISTORY OF ANEMIA: ICD-10-CM

## 2023-07-14 DIAGNOSIS — R42 LIGHT HEADEDNESS: ICD-10-CM

## 2023-07-14 DIAGNOSIS — R42 LIGHT HEADEDNESS: Primary | ICD-10-CM

## 2023-07-14 LAB
ALBUMIN SERPL-MCNC: 4.2 G/DL (ref 3.5–5)
ALBUMIN/GLOB SERPL: 1.3 (ref 0.4–1.6)
ALP SERPL-CCNC: 57 U/L (ref 50–136)
ALT SERPL-CCNC: 34 U/L (ref 12–65)
ANION GAP SERPL CALC-SCNC: 8 MMOL/L (ref 2–11)
AST SERPL-CCNC: 19 U/L (ref 15–37)
BASOPHILS # BLD: 0 K/UL (ref 0–0.2)
BASOPHILS NFR BLD: 1 % (ref 0–2)
BILIRUB SERPL-MCNC: 0.6 MG/DL (ref 0.2–1.1)
BUN SERPL-MCNC: 12 MG/DL (ref 6–23)
CALCIUM SERPL-MCNC: 9.8 MG/DL (ref 8.3–10.4)
CHLORIDE SERPL-SCNC: 109 MMOL/L (ref 101–110)
CO2 SERPL-SCNC: 24 MMOL/L (ref 21–32)
CREAT SERPL-MCNC: 0.8 MG/DL (ref 0.6–1)
DIFFERENTIAL METHOD BLD: ABNORMAL
EOSINOPHIL # BLD: 0.1 K/UL (ref 0–0.8)
EOSINOPHIL NFR BLD: 3 % (ref 0.5–7.8)
ERYTHROCYTE [DISTWIDTH] IN BLOOD BY AUTOMATED COUNT: 13.9 % (ref 11.9–14.6)
FERRITIN SERPL-MCNC: 15 NG/ML (ref 8–388)
GLOBULIN SER CALC-MCNC: 3.2 G/DL (ref 2.8–4.5)
GLUCOSE SERPL-MCNC: 82 MG/DL (ref 65–100)
HCT VFR BLD AUTO: 44.4 % (ref 35.8–46.3)
HGB BLD-MCNC: 13.7 G/DL (ref 11.7–15.4)
IMM GRANULOCYTES # BLD AUTO: 0 K/UL (ref 0–0.5)
IMM GRANULOCYTES NFR BLD AUTO: 0 % (ref 0–5)
LYMPHOCYTES # BLD: 1.5 K/UL (ref 0.5–4.6)
LYMPHOCYTES NFR BLD: 32 % (ref 13–44)
MCH RBC QN AUTO: 27.2 PG (ref 26.1–32.9)
MCHC RBC AUTO-ENTMCNC: 30.9 G/DL (ref 31.4–35)
MCV RBC AUTO: 88.3 FL (ref 82–102)
MONOCYTES # BLD: 0.5 K/UL (ref 0.1–1.3)
MONOCYTES NFR BLD: 10 % (ref 4–12)
NEUTS SEG # BLD: 2.5 K/UL (ref 1.7–8.2)
NEUTS SEG NFR BLD: 54 % (ref 43–78)
NRBC # BLD: 0 K/UL (ref 0–0.2)
PLATELET # BLD AUTO: 221 K/UL (ref 150–450)
PMV BLD AUTO: 12.1 FL (ref 9.4–12.3)
POTASSIUM SERPL-SCNC: 4.2 MMOL/L (ref 3.5–5.1)
PROT SERPL-MCNC: 7.4 G/DL (ref 6.3–8.2)
RBC # BLD AUTO: 5.03 M/UL (ref 4.05–5.2)
SODIUM SERPL-SCNC: 141 MMOL/L (ref 133–143)
TSH W FREE THYROID IF ABNORMAL: 0.62 UIU/ML (ref 0.36–3.74)
WBC # BLD AUTO: 4.6 K/UL (ref 4.3–11.1)

## 2023-07-14 PROCEDURE — 99213 OFFICE O/P EST LOW 20 MIN: CPT | Performed by: INTERNAL MEDICINE

## 2023-07-14 ASSESSMENT — ENCOUNTER SYMPTOMS
EYE PAIN: 0
VOICE CHANGE: 0
STRIDOR: 0
RECTAL PAIN: 0

## 2023-07-14 NOTE — PROGRESS NOTES
FOLLOWUP VISIT    Subjective:    Ms. Darryle Seed is a 41 y.o., female,   Chief Complaint   Patient presents with    Dizziness       HPI:    This patient is a 28-year-old female whose primary care physician is Dr. Domenica Saha. Seeing the patient today in coverage for Dr. Domenica Saha is currently on vacation. She complains of dizziness. It is not vertigo. She has not had any syncope or near syncope. She just at times feels vaguely lightheaded. Sometimes the sensation occurs while sitting at her desk looking at her computer screen. Sometimes it happens if she stands up too quickly. Denies any chest pain or shortness of breath. No PND or edema or orthopnea or palpitations. No focal neurologic symptoms. She did recently have a pregnancy and a miscarriage and underwent a D&C on June 1, 2023. She is not sure how much blood she may have lost with her miscarriage. She should be taking iron but frequently forgets. No other relevant symptoms. The following portions of the patient's history were reviewed and updated as appropriate:      Past Medical History:   Diagnosis Date    Abnormal Pap smear     HGSIL    Abnormal Papanicolaou smear of cervix     Anxiety     Asthma     last exacerbation >10yrs. rescue inhaler prn    Cervical dysplasia     H/O seasonal allergies     Other ill-defined conditions(799.89)     chronic acne    PTSD (post-traumatic stress disorder)     Shingles     2 weeks ago    Thyroid disease     hypothyroid.  stable w/o med since 6/2012       Past Surgical History:   Procedure Laterality Date    DILATION AND CURETTAGE OF UTERUS  06/01/2023    GYN  2013    Cervical dysplasia-LEEP        Family History   Problem Relation Age of Onset    Diabetes Paternal Grandmother     Diabetes Maternal Grandmother     Breast Cancer Other         maternal great aunt    Asthma Mother     Migraines Mother     Diabetes Mother     Anxiety Disorder Mother     Breast Cancer Maternal Aunt     Cancer Maternal Uncle

## 2023-07-15 LAB — HCG SERPL QL: NEGATIVE

## 2023-07-26 ASSESSMENT — PATIENT HEALTH QUESTIONNAIRE - PHQ9
1. LITTLE INTEREST OR PLEASURE IN DOING THINGS: SEVERAL DAYS
2. FEELING DOWN, DEPRESSED OR HOPELESS: SEVERAL DAYS
2. FEELING DOWN, DEPRESSED OR HOPELESS: 1
SUM OF ALL RESPONSES TO PHQ9 QUESTIONS 1 & 2: 2
SUM OF ALL RESPONSES TO PHQ QUESTIONS 1-9: 2
SUM OF ALL RESPONSES TO PHQ9 QUESTIONS 1 & 2: 2
SUM OF ALL RESPONSES TO PHQ QUESTIONS 1-9: 2
1. LITTLE INTEREST OR PLEASURE IN DOING THINGS: 1

## 2023-07-28 ENCOUNTER — OFFICE VISIT (OUTPATIENT)
Dept: INTERNAL MEDICINE CLINIC | Facility: CLINIC | Age: 38
End: 2023-07-28
Payer: COMMERCIAL

## 2023-07-28 VITALS
BODY MASS INDEX: 28.71 KG/M2 | DIASTOLIC BLOOD PRESSURE: 68 MMHG | HEART RATE: 77 BPM | HEIGHT: 62 IN | OXYGEN SATURATION: 98 % | SYSTOLIC BLOOD PRESSURE: 106 MMHG | WEIGHT: 156 LBS

## 2023-07-28 DIAGNOSIS — F41.9 ANXIETY: ICD-10-CM

## 2023-07-28 DIAGNOSIS — E03.8 SUBCLINICAL HYPOTHYROIDISM: ICD-10-CM

## 2023-07-28 DIAGNOSIS — E78.2 MIXED HYPERLIPIDEMIA: ICD-10-CM

## 2023-07-28 DIAGNOSIS — A60.09 HERPES GENITALIS IN WOMEN: Primary | ICD-10-CM

## 2023-07-28 DIAGNOSIS — R79.0 LOW FERRITIN: ICD-10-CM

## 2023-07-28 PROBLEM — O03.4 INCOMPLETE ABORTION: Status: ACTIVE | Noted: 2023-05-24

## 2023-07-28 PROCEDURE — 99214 OFFICE O/P EST MOD 30 MIN: CPT | Performed by: INTERNAL MEDICINE

## 2023-07-28 RX ORDER — VALACYCLOVIR HYDROCHLORIDE 1 G/1
1000 TABLET, FILM COATED ORAL DAILY
Qty: 90 TABLET | Refills: 3 | Status: SHIPPED | OUTPATIENT
Start: 2023-07-28

## 2023-07-28 ASSESSMENT — ENCOUNTER SYMPTOMS
SHORTNESS OF BREATH: 0
COUGH: 0
ABDOMINAL PAIN: 0
CHEST TIGHTNESS: 0
ABDOMINAL DISTENTION: 0
CONSTIPATION: 0

## 2023-07-28 NOTE — PROGRESS NOTES
Chief Complaint   Patient presents with    Hypertension     Dizziness. ? Anxiety   Miscarriage. Allergies. Donato Mcfarland is a 41 y.o. female who presents today for Hypertension (Dizziness. ? Anxiety   Miscarriage. Oneil Huff. )     She is here for follow-up after recent visit with Dr. Shant Moffett for dizziness, elevated blood pressure, and anxiety, her labs were no showing significant abnormalities other than slightly decreased ferritin level, since last visit with me she has been following with OB/GYN and unfortunately she had 2 miscarriages, she is currently following with counseling, her therapist is working with her to deal with degree of, and coping mechanisms. 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 Herpes genitalis in women  -     valACYclovir (VALTREX) 1 g tablet; Take 1 tablet by mouth daily, Disp-90 tablet, R-3Normal  2. Subclinical hypothyroidism  -     Hemoglobin A1C; Future  -     TSH with Reflex; Future  3. Mixed hyperlipidemia  -     Lipid Panel; Future  -     CBC with Auto Differential; Future  -     Comprehensive Metabolic Panel; Future  4. Low ferritin  -     Ferritin; Future  5.  Anxiety    Patient is planning to come back for her annual exam in few months, we will repeat some of her labs including the ferritin level, hemoglobin, and pertinent

## 2023-08-03 ENCOUNTER — OFFICE VISIT (OUTPATIENT)
Dept: OBGYN CLINIC | Age: 38
End: 2023-08-03

## 2023-08-03 VITALS
DIASTOLIC BLOOD PRESSURE: 60 MMHG | BODY MASS INDEX: 29.08 KG/M2 | WEIGHT: 158 LBS | SYSTOLIC BLOOD PRESSURE: 110 MMHG | HEIGHT: 62 IN

## 2023-08-03 DIAGNOSIS — Z80.3 FAMILY HISTORY OF BREAST CANCER: ICD-10-CM

## 2023-08-03 DIAGNOSIS — Z12.4 SCREENING FOR CERVICAL CANCER: ICD-10-CM

## 2023-08-03 DIAGNOSIS — Z11.3 SCREENING FOR STD (SEXUALLY TRANSMITTED DISEASE): ICD-10-CM

## 2023-08-03 DIAGNOSIS — Z11.51 SCREENING FOR HPV (HUMAN PAPILLOMAVIRUS): ICD-10-CM

## 2023-08-03 DIAGNOSIS — D18.03 LIVER HEMANGIOMA: ICD-10-CM

## 2023-08-03 DIAGNOSIS — E28.2 PCOS (POLYCYSTIC OVARIAN SYNDROME): ICD-10-CM

## 2023-08-03 DIAGNOSIS — F41.9 ANXIETY: ICD-10-CM

## 2023-08-03 DIAGNOSIS — Z01.419 WELL WOMAN EXAM WITH ROUTINE GYNECOLOGICAL EXAM: Primary | ICD-10-CM

## 2023-08-03 NOTE — PROGRESS NOTES
content normal. .    Pelvic:   External genitalia wnl, no lesions, rashes  Clitoris and urethra midline  Vagina pink, moist, well rugated  Cervix without lesion/masses, DC wnl  Uterus normal in size and contour, no masses, NTTP  Adnexa without masses, NTTP    Breasts:   Symmetric, no lesions, masses, rashes, no abnl nipple Dc       Counseling:  Discussed General Recommendations:  -Routine Pap (unless cervix removed for benign reasons)  -STD screening annually pts </= 26yo or high risk   -lipid profile every 5 yrs  -Tdap once and then Td every 10yrs  -Influenza Vaccine, annually  -Healthy eating/exercise   -HPV vaccine newest recs (10yo-46yo)      Discussed forms of contraception available, including but not limited to OCPs, patches, rings, Depo Provera, LARCs (IUDs vs Nexplanons), and permanent sterilization (BTL vs vasectomy). Discussed benefits and possible risks/SEs of these, including typical use failure rates.'    Declines all.        ASSESSMENT/PLAN:   41 y.o., [id number], for annual GYN exam:    1) Annual:   -Cotesting today    -full STD testing    -start PNV    -safe sexual practices    -FU w/ PCP for all non-GYN medical issues and regular screening     -annual Flu vaccine recommended    -healthy eating, exercise       2) CHTN, hx Liver Hemangioma:   -avoid estrogen containing options      3) Fam hx Breast CA:    -referral to genetics for counseling and genetic testing   -referral for 3D Mammo screening starting now given strong fam hx              Abrahma Burr MD

## 2023-08-04 LAB
HBV SURFACE AG SER QL: NONREACTIVE
HCV AB SER QL: NONREACTIVE
HIV 1+2 AB+HIV1 P24 AG SERPL QL IA: NONREACTIVE
HIV 1/2 RESULT COMMENT: NORMAL
RPR SER QL: NONREACTIVE

## 2023-08-05 PROBLEM — Z80.3 FAMILY HISTORY OF BREAST CANCER: Status: ACTIVE | Noted: 2019-02-15

## 2023-08-09 LAB
C TRACH RRNA CVX QL NAA+PROBE: NEGATIVE
CYTOLOGIST CVX/VAG CYTO: NORMAL
CYTOLOGY CVX/VAG DOC THIN PREP: NORMAL
HPV APTIMA: NEGATIVE
HPV GENOTYPE REFLEX: NORMAL
Lab: NORMAL
N GONORRHOEA RRNA CVX QL NAA+PROBE: NEGATIVE
PATH REPORT.FINAL DX SPEC: NORMAL
STAT OF ADQ CVX/VAG CYTO-IMP: NORMAL
T VAGINALIS RRNA SPEC QL NAA+PROBE: NEGATIVE

## 2023-08-28 PROBLEM — I10 HYPERTENSION: Status: ACTIVE | Noted: 2023-08-28

## 2023-08-28 PROBLEM — F12.10 TETRAHYDROCANNABINOL (THC) USE DISORDER, MILD, ABUSE: Status: ACTIVE | Noted: 2023-08-28

## 2023-10-26 ENCOUNTER — OFFICE VISIT (OUTPATIENT)
Dept: INTERNAL MEDICINE CLINIC | Facility: CLINIC | Age: 38
End: 2023-10-26
Payer: COMMERCIAL

## 2023-10-26 VITALS
WEIGHT: 156.2 LBS | HEIGHT: 62 IN | DIASTOLIC BLOOD PRESSURE: 60 MMHG | HEART RATE: 77 BPM | SYSTOLIC BLOOD PRESSURE: 118 MMHG | BODY MASS INDEX: 28.74 KG/M2

## 2023-10-26 DIAGNOSIS — J30.89 ENVIRONMENTAL AND SEASONAL ALLERGIES: ICD-10-CM

## 2023-10-26 PROCEDURE — 3074F SYST BP LT 130 MM HG: CPT | Performed by: INTERNAL MEDICINE

## 2023-10-26 PROCEDURE — 3078F DIAST BP <80 MM HG: CPT | Performed by: INTERNAL MEDICINE

## 2023-10-26 PROCEDURE — 99213 OFFICE O/P EST LOW 20 MIN: CPT | Performed by: INTERNAL MEDICINE

## 2023-10-26 RX ORDER — MONTELUKAST SODIUM 10 MG/1
10 TABLET ORAL NIGHTLY
Qty: 30 TABLET | Refills: 0 | Status: SHIPPED | OUTPATIENT
Start: 2023-10-26

## 2023-10-26 ASSESSMENT — ENCOUNTER SYMPTOMS
RECTAL PAIN: 0
STRIDOR: 0
EYE PAIN: 0
VOICE CHANGE: 0

## 2023-10-26 NOTE — PROGRESS NOTES
Smokeless tobacco: Never   Vaping Use    Vaping Use: Never used   Substance and Sexual Activity    Alcohol use: Yes    Drug use: No    Sexual activity: Yes     Partners: Male     Birth control/protection: Inserts   Other Topics Concern    Not on file   Social History Narrative    Abuse: Feels safe at home, no history of physical abuse, no history of sexual abuse     Social Determinants of Health     Financial Resource Strain: Not on file   Food Insecurity: Not on file   Transportation Needs: Not on file   Physical Activity: Not on file   Stress: Not on file   Social Connections: Not on file   Intimate Partner Violence: Not on file   Housing Stability: Not on file       Current Outpatient Medications   Medication Sig Dispense Refill    montelukast (SINGULAIR) 10 MG tablet Take 1 tablet by mouth nightly 30 tablet 0    valACYclovir (VALTREX) 1 g tablet Take 1 tablet by mouth daily 90 tablet 3    Ergocalciferol (DRISDOL) 200 MCG/ML drops Take 1 tablet by mouth      escitalopram (LEXAPRO) 10 MG tablet Take 1 tablet by mouth daily      ketoconazole (NIZORAL) 2 % shampoo       Iron-Folic Acid-Vit B15 (IRON FORMULA PO) Take by mouth      Cyanocobalamin (VITAMIN B 12 PO) Take by mouth      albuterol sulfate  (90 Base) MCG/ACT inhaler Inhale 2 puffs into the lungs every 6 hours as needed      fexofenadine (ALLEGRA) 180 MG tablet Take 1 tablet by mouth daily      fluticasone (FLONASE) 50 MCG/ACT nasal spray 2 sprays by Nasal route daily      tiZANidine (ZANAFLEX) 4 MG tablet Take 1 tablet by mouth       No current facility-administered medications for this visit. Allergies as of 10/26/2023 - Fully Reviewed 10/26/2023   Allergen Reaction Noted    Fluconazole Hives 12/03/2020    Propranolol Rash 10/17/2022       Review of Systems   Constitutional:  Negative for activity change and appetite change. HENT:  Negative for drooling and voice change. Eyes:  Negative for pain. Respiratory:  Negative for stridor.

## 2023-10-27 DIAGNOSIS — E78.2 MIXED HYPERLIPIDEMIA: ICD-10-CM

## 2023-10-27 DIAGNOSIS — E03.8 SUBCLINICAL HYPOTHYROIDISM: ICD-10-CM

## 2023-10-27 DIAGNOSIS — R79.0 LOW FERRITIN: ICD-10-CM

## 2023-10-27 LAB
BASOPHILS # BLD: 0.1 K/UL (ref 0–0.2)
BASOPHILS NFR BLD: 1 % (ref 0–2)
DIFFERENTIAL METHOD BLD: ABNORMAL
EOSINOPHIL # BLD: 0.1 K/UL (ref 0–0.8)
EOSINOPHIL NFR BLD: 2 % (ref 0.5–7.8)
ERYTHROCYTE [DISTWIDTH] IN BLOOD BY AUTOMATED COUNT: 14.8 % (ref 11.9–14.6)
EST. AVERAGE GLUCOSE BLD GHB EST-MCNC: 103 MG/DL
HBA1C MFR BLD: 5.2 % (ref 4.8–5.6)
HCT VFR BLD AUTO: 43.6 % (ref 35.8–46.3)
HGB BLD-MCNC: 13.6 G/DL (ref 11.7–15.4)
IMM GRANULOCYTES # BLD AUTO: 0 K/UL (ref 0–0.5)
IMM GRANULOCYTES NFR BLD AUTO: 0 % (ref 0–5)
LYMPHOCYTES # BLD: 1.8 K/UL (ref 0.5–4.6)
LYMPHOCYTES NFR BLD: 23 % (ref 13–44)
MCH RBC QN AUTO: 27.3 PG (ref 26.1–32.9)
MCHC RBC AUTO-ENTMCNC: 31.2 G/DL (ref 31.4–35)
MCV RBC AUTO: 87.6 FL (ref 82–102)
MONOCYTES # BLD: 0.5 K/UL (ref 0.1–1.3)
MONOCYTES NFR BLD: 7 % (ref 4–12)
NEUTS SEG # BLD: 5.2 K/UL (ref 1.7–8.2)
NEUTS SEG NFR BLD: 67 % (ref 43–78)
NRBC # BLD: 0 K/UL (ref 0–0.2)
PLATELET # BLD AUTO: 216 K/UL (ref 150–450)
PMV BLD AUTO: 11.6 FL (ref 9.4–12.3)
RBC # BLD AUTO: 4.98 M/UL (ref 4.05–5.2)
WBC # BLD AUTO: 7.7 K/UL (ref 4.3–11.1)

## 2023-10-28 LAB
ALBUMIN SERPL-MCNC: 4.3 G/DL (ref 3.5–5)
ALBUMIN/GLOB SERPL: 1.1 (ref 0.4–1.6)
ALP SERPL-CCNC: 60 U/L (ref 50–136)
ALT SERPL-CCNC: 34 U/L (ref 12–65)
ANION GAP SERPL CALC-SCNC: 4 MMOL/L (ref 2–11)
AST SERPL-CCNC: 16 U/L (ref 15–37)
BILIRUB SERPL-MCNC: 0.7 MG/DL (ref 0.2–1.1)
BUN SERPL-MCNC: 10 MG/DL (ref 6–23)
CALCIUM SERPL-MCNC: 9.1 MG/DL (ref 8.3–10.4)
CHLORIDE SERPL-SCNC: 108 MMOL/L (ref 101–110)
CHOLEST SERPL-MCNC: 194 MG/DL
CO2 SERPL-SCNC: 27 MMOL/L (ref 21–32)
CREAT SERPL-MCNC: 0.8 MG/DL (ref 0.6–1)
FERRITIN SERPL-MCNC: 43 NG/ML (ref 8–388)
GLOBULIN SER CALC-MCNC: 3.8 G/DL (ref 2.8–4.5)
GLUCOSE SERPL-MCNC: 95 MG/DL (ref 65–100)
HDLC SERPL-MCNC: 60 MG/DL (ref 40–60)
HDLC SERPL: 3.2
LDLC SERPL CALC-MCNC: 119.2 MG/DL
POTASSIUM SERPL-SCNC: 3.9 MMOL/L (ref 3.5–5.1)
PROT SERPL-MCNC: 8.1 G/DL (ref 6.3–8.2)
SODIUM SERPL-SCNC: 139 MMOL/L (ref 133–143)
TRIGL SERPL-MCNC: 74 MG/DL (ref 35–150)
TSH W FREE THYROID IF ABNORMAL: 0.69 UIU/ML (ref 0.36–3.74)
VLDLC SERPL CALC-MCNC: 14.8 MG/DL (ref 6–23)

## 2023-10-31 ENCOUNTER — TELEPHONE (OUTPATIENT)
Dept: OBGYN CLINIC | Age: 38
End: 2023-10-31

## 2023-10-31 ENCOUNTER — HOSPITAL ENCOUNTER (EMERGENCY)
Age: 38
Discharge: HOME | End: 2023-10-31
Payer: COMMERCIAL

## 2023-10-31 ENCOUNTER — APPOINTMENT (OUTPATIENT)
Dept: ULTRASOUND IMAGING | Age: 38
End: 2023-10-31
Payer: COMMERCIAL

## 2023-10-31 VITALS
TEMPERATURE: 98.4 F | OXYGEN SATURATION: 98 % | HEIGHT: 62 IN | HEART RATE: 71 BPM | RESPIRATION RATE: 16 BRPM | DIASTOLIC BLOOD PRESSURE: 89 MMHG | WEIGHT: 155 LBS | SYSTOLIC BLOOD PRESSURE: 127 MMHG | BODY MASS INDEX: 28.52 KG/M2

## 2023-10-31 DIAGNOSIS — R10.9 ABDOMINAL PAIN DURING PREGNANCY IN FIRST TRIMESTER: Primary | ICD-10-CM

## 2023-10-31 DIAGNOSIS — O26.891 ABDOMINAL PAIN DURING PREGNANCY IN FIRST TRIMESTER: Primary | ICD-10-CM

## 2023-10-31 LAB
ALBUMIN SERPL-MCNC: 4.3 G/DL (ref 3.5–5)
ALBUMIN/GLOB SERPL: 1 (ref 0.4–1.6)
ALP SERPL-CCNC: 71 U/L (ref 50–136)
ALT SERPL-CCNC: 28 U/L (ref 12–65)
ANION GAP SERPL CALC-SCNC: 6 MMOL/L (ref 2–11)
APPEARANCE UR: CLEAR
AST SERPL-CCNC: 29 U/L (ref 15–37)
BASOPHILS # BLD: 0 K/UL (ref 0–0.2)
BASOPHILS NFR BLD: 1 % (ref 0–2)
BILIRUB SERPL-MCNC: 0.8 MG/DL (ref 0.2–1.1)
BILIRUB UR QL: NEGATIVE
BUN SERPL-MCNC: 11 MG/DL (ref 6–23)
CALCIUM SERPL-MCNC: 9.6 MG/DL (ref 8.3–10.4)
CHLORIDE SERPL-SCNC: 105 MMOL/L (ref 101–110)
CO2 SERPL-SCNC: 25 MMOL/L (ref 21–32)
COLOR UR: ABNORMAL
CREAT SERPL-MCNC: 0.74 MG/DL (ref 0.6–1)
DIFFERENTIAL METHOD BLD: NORMAL
EOSINOPHIL # BLD: 0.1 K/UL (ref 0–0.8)
EOSINOPHIL NFR BLD: 1 % (ref 0.5–7.8)
ERYTHROCYTE [DISTWIDTH] IN BLOOD BY AUTOMATED COUNT: 14.5 % (ref 11.9–14.6)
GLOBULIN SER CALC-MCNC: 4.2 G/DL (ref 2.8–4.5)
GLUCOSE SERPL-MCNC: 85 MG/DL (ref 65–100)
GLUCOSE UR STRIP.AUTO-MCNC: NEGATIVE MG/DL
HCG SERPL-ACNC: 240 MIU/ML (ref 0–6)
HCG UR QL: POSITIVE
HCT VFR BLD AUTO: 42.9 % (ref 35.8–46.3)
HGB BLD-MCNC: 13.5 G/DL (ref 11.7–15.4)
HGB UR QL STRIP: NEGATIVE
IMM GRANULOCYTES # BLD AUTO: 0 K/UL (ref 0–0.5)
IMM GRANULOCYTES NFR BLD AUTO: 0 % (ref 0–5)
KETONES UR QL STRIP.AUTO: ABNORMAL MG/DL
LEUKOCYTE ESTERASE UR QL STRIP.AUTO: NEGATIVE
LYMPHOCYTES # BLD: 2 K/UL (ref 0.5–4.6)
LYMPHOCYTES NFR BLD: 26 % (ref 13–44)
MCH RBC QN AUTO: 27.7 PG (ref 26.1–32.9)
MCHC RBC AUTO-ENTMCNC: 31.5 G/DL (ref 31.4–35)
MCV RBC AUTO: 87.9 FL (ref 82–102)
MONOCYTES # BLD: 0.6 K/UL (ref 0.1–1.3)
MONOCYTES NFR BLD: 8 % (ref 4–12)
NEUTS SEG # BLD: 4.8 K/UL (ref 1.7–8.2)
NEUTS SEG NFR BLD: 64 % (ref 43–78)
NITRITE UR QL STRIP.AUTO: NEGATIVE
NRBC # BLD: 0 K/UL (ref 0–0.2)
PH UR STRIP: 6.5 (ref 5–9)
PLATELET # BLD AUTO: 237 K/UL (ref 150–450)
PMV BLD AUTO: 11.1 FL (ref 9.4–12.3)
POTASSIUM SERPL-SCNC: 4.3 MMOL/L (ref 3.5–5.1)
PROT SERPL-MCNC: 8.5 G/DL (ref 6.3–8.2)
PROT UR STRIP-MCNC: NEGATIVE MG/DL
RBC # BLD AUTO: 4.88 M/UL (ref 4.05–5.2)
SODIUM SERPL-SCNC: 136 MMOL/L (ref 133–143)
SP GR UR REFRACTOMETRY: 1.02 (ref 1–1.02)
UROBILINOGEN UR QL STRIP.AUTO: 0.2 EU/DL (ref 0.2–1)
WBC # BLD AUTO: 7.5 K/UL (ref 4.3–11.1)

## 2023-10-31 PROCEDURE — 80053 COMPREHEN METABOLIC PANEL: CPT

## 2023-10-31 PROCEDURE — 81025 URINE PREGNANCY TEST: CPT

## 2023-10-31 PROCEDURE — 99284 EMERGENCY DEPT VISIT MOD MDM: CPT

## 2023-10-31 PROCEDURE — 76801 OB US < 14 WKS SINGLE FETUS: CPT

## 2023-10-31 PROCEDURE — 84702 CHORIONIC GONADOTROPIN TEST: CPT

## 2023-10-31 PROCEDURE — 81003 URINALYSIS AUTO W/O SCOPE: CPT

## 2023-10-31 PROCEDURE — 85025 COMPLETE CBC W/AUTO DIFF WBC: CPT

## 2023-10-31 ASSESSMENT — PAIN DESCRIPTION - LOCATION: LOCATION: PELVIS

## 2023-10-31 ASSESSMENT — PAIN SCALES - GENERAL: PAINLEVEL_OUTOF10: 3

## 2023-10-31 NOTE — TELEPHONE ENCOUNTER
Pt called with c/o LLQ pain. Pt is currently 8w4d pregnant by LMP of 9/1/23. Pt reports spotting around 10/1/23 but \"it was not like a normal period. \" Blood type- B Positive. Pt denies vaginal bleeding since 10/1/23. Pt has not been seen in the office yet for this pregnancy. Pt instructed to go to [facility] ED for evaluation and rule out ectopic pregnancy. Pt agree's and voiced understanding.

## 2023-10-31 NOTE — ED TRIAGE NOTES
Pt reports x5 days of constant pelvic pain. Reports she found out she was pregnant today. Reports nausea. No bleeding. LMP 10/1 that lasted 3 days. OBGYN Referred her to come to ER.  Pt had prior miscarriage this year

## 2023-10-31 NOTE — DISCHARGE INSTRUCTIONS
There are no concerning findings on your ultrasound results today. It is too early on in your pregnancy to determine the location of the pregnancy. Follow-up with your OB in the next 3 to 5 days to have repeat hCG level blood test drawn and possibly another ultrasound. Your ultrasound does show that you have a cyst but this should not affect the pregnancy. If you have any new or worsening symptoms in between that timeframe and now, return to the emergency department for evaluation. Take Tylenol as needed for pain relief. Follow-up with your OB.

## 2023-10-31 NOTE — ED PROVIDER NOTES
MCG/ACT inhaler Inhale 2 puffs into the lungs every 6 hours as neededHistorical Med      fexofenadine (ALLEGRA) 180 MG tablet Take 1 tablet by mouth dailyHistorical Med      fluticasone (FLONASE) 50 MCG/ACT nasal spray 2 sprays by Nasal route dailyHistorical Med      tiZANidine (ZANAFLEX) 4 MG tablet Take 1 tablet by mouthHistorical Med              Results for orders placed or performed during the hospital encounter of 10/31/23   US TRANSABDOMINAL TRANSVAGINAL LESS THAN 14 WEEKS    Narrative    EXAMINATION: FIRST TRIMESTER PELVIC ULTRASOUND COMPLETE and Duplex Doppler   Complete    DATE OF EXAM: 10/31/2023 5:43 PM    HISTORY: L pelvic pain, pregnancy 4 weeks 0 days by dates    COMPARISON: None. TECHNIQUE: Transabdominal and endovaginal images were obtained as well as color   Doppler and duplex Doppler imaging with spectral analysis of arterial and venous   waveform patterns. Tawana Kenney FINDINGS:    Sac: Not present  CRL: Not present  FHM: Not present  Yolk Sac: Not present  Endometrium: 1.0 cm. Uterus: No focal parenchymal abnormality. Right Ovary: The right ovary measures 2.8 x 1.5 x 1.8 cm. There is no concerning   mass. Duplex Doppler interrogation demonstrates normal arterial inflow and   venous outflow spectral waveform patterns. Left Ovary: The left ovary measures 5.7 x 3.3 x 4.1 cm. Left ovarian cyst with   internal echoes measures 2.8 x 3.5 x 3.3 cm. Duplex Doppler interrogation   demonstrates normal arterial inflow and venous outflow spectral waveform   patterns. Free Fluid: None. Impression    1. Pregnancy unknown location. No visible intrauterine pregnancy. Given LMP   this is most likely secondary to early dates although theoretically pregnancy   failure and ectopic pregnancy are not entirely excluded. Recommend following   quantitative beta hCG with repeat imaging in 3-5 days.     2.  Complex left ovarian cyst maximum dimension 3.5 cm most consistent with   hemorrhagic cyst. No evidence

## 2023-11-01 ENCOUNTER — CARE COORDINATION (OUTPATIENT)
Dept: OTHER | Facility: CLINIC | Age: 38
End: 2023-11-01

## 2023-11-01 ENCOUNTER — TELEPHONE (OUTPATIENT)
Dept: OBGYN CLINIC | Age: 38
End: 2023-11-01

## 2023-11-01 ENCOUNTER — OFFICE VISIT (OUTPATIENT)
Dept: INTERNAL MEDICINE CLINIC | Facility: CLINIC | Age: 38
End: 2023-11-01
Payer: COMMERCIAL

## 2023-11-01 VITALS
TEMPERATURE: 96.9 F | WEIGHT: 158.8 LBS | OXYGEN SATURATION: 98 % | DIASTOLIC BLOOD PRESSURE: 78 MMHG | SYSTOLIC BLOOD PRESSURE: 122 MMHG | BODY MASS INDEX: 29.22 KG/M2 | HEART RATE: 95 BPM | HEIGHT: 62 IN

## 2023-11-01 DIAGNOSIS — Z00.00 ANNUAL PHYSICAL EXAM: Primary | ICD-10-CM

## 2023-11-01 DIAGNOSIS — O36.80X0 PREGNANCY OF UNKNOWN ANATOMIC LOCATION: Primary | ICD-10-CM

## 2023-11-01 DIAGNOSIS — F41.9 ANXIETY: ICD-10-CM

## 2023-11-01 DIAGNOSIS — Z3A.01 LESS THAN 8 WEEKS GESTATION OF PREGNANCY: ICD-10-CM

## 2023-11-01 DIAGNOSIS — E78.2 MIXED HYPERLIPIDEMIA: ICD-10-CM

## 2023-11-01 DIAGNOSIS — Z23 ENCOUNTER FOR VACCINATION: ICD-10-CM

## 2023-11-01 PROBLEM — R42 LIGHT HEADEDNESS: Status: RESOLVED | Noted: 2023-07-14 | Resolved: 2023-11-01

## 2023-11-01 PROBLEM — O03.4 INCOMPLETE ABORTION: Status: RESOLVED | Noted: 2023-05-24 | Resolved: 2023-11-01

## 2023-11-01 PROBLEM — E03.8 SUBCLINICAL HYPOTHYROIDISM: Status: RESOLVED | Noted: 2022-11-01 | Resolved: 2023-11-01

## 2023-11-01 PROBLEM — I10 HYPERTENSION: Status: RESOLVED | Noted: 2023-08-28 | Resolved: 2023-11-01

## 2023-11-01 PROCEDURE — 90674 CCIIV4 VAC NO PRSV 0.5 ML IM: CPT | Performed by: INTERNAL MEDICINE

## 2023-11-01 PROCEDURE — 90471 IMMUNIZATION ADMIN: CPT | Performed by: INTERNAL MEDICINE

## 2023-11-01 PROCEDURE — 99395 PREV VISIT EST AGE 18-39: CPT | Performed by: INTERNAL MEDICINE

## 2023-11-01 ASSESSMENT — PATIENT HEALTH QUESTIONNAIRE - PHQ9
1. LITTLE INTEREST OR PLEASURE IN DOING THINGS: 0
SUM OF ALL RESPONSES TO PHQ QUESTIONS 1-9: 0
2. FEELING DOWN, DEPRESSED OR HOPELESS: 0
SUM OF ALL RESPONSES TO PHQ9 QUESTIONS 1 & 2: 0

## 2023-11-01 ASSESSMENT — ENCOUNTER SYMPTOMS
ABDOMINAL PAIN: 0
SHORTNESS OF BREATH: 0
COUGH: 0
ABDOMINAL DISTENTION: 0
CHEST TIGHTNESS: 0
CONSTIPATION: 0

## 2023-11-01 NOTE — PATIENT INSTRUCTIONS
Data collection to monitor pregnancy and infant outcomes following exposure to antidepressant medications is ongoing. Patients exposed to antidepressants during pregnancy are encouraged to enroll in the [facility] Pregnancy Registry for Antidepressants. Pregnant patients 25to 39years of age or their health care providers may contact the registry to enroll; enrollment should be done as early in pregnancy as possible ([phone] or [email or web address]).

## 2023-11-01 NOTE — CARE COORDINATION
Patient eligible for [facility] Manager contacted the patient by telephone to discuss the maternity management program.  Patient agrees to care management services at this time. Verified name and  with patient as identifiers. Patient Current Location: [facility] within 2 business days of discharge: Yes     Last Discharge Facility       Date Complaint Diagnosis Description Type Department Provider    10/31/23 Pelvic Pain Abdominal pain during pregnancy in first trimester ED (DISCHARGE) [facility]             Risk Factors Identified:  multip      Needs to be reviewed by the provider   none         Method of communication with provider : none    Advance Care Planning:   Does patient have an Advance Directive:  not on file. Does patient have OB/Gyn Selected? yes    Discussed follow up appointments. If no appointment was previously scheduled, appointment scheduling offered: Yes  [facility] follow up appointment(s):   Future Appointments   Date Time Provider [facility] [address]   2023  2:20 PM Neil Wright MD [facility] AMB   11/10/2023  8:40 AM Neil Wright MD [facility] AMB   2024  2:15 PM Juancho Ho MD [address] [place] [address] [facility] AMB     [facility] follow up appointment(s): n/a    OB History:   OB History    Para Term  AB Living   4 2 2   2 2   SAB IAB Ectopic Molar Multiple Live Births   1 1       2      # Outcome Date GA Lbr David/2nd Weight Sex Delivery Anes PTL Lv   4 SAB 2023           3 IAB 2023     TAB   FD   2 Term 2008    F Vag-Spont   SVITLANA   1 Term 2006    M Vag-Spont   SVITLANA     [id number]  Unknown    Medication reconciliation was performed with patient, who verbalizes understanding of administration of home medications. Advised obtaining a 90-day supply of all daily and as-needed medications. Barriers/Support system:  spouse  Return to work planning?  Yes      2nd and 3rd Trimester Focused Assessment:

## 2023-11-01 NOTE — TELEPHONE ENCOUNTER
Spoke with pt. Still having some lower abd pain, mainly at night. Denies vag bleeding. Advised we get pt in for serial HCG quants, and preg confirmation US at around 7 weeks. Discussed returning to ER if worsening pain, heavy vaginal bleeding. Scheduled lab for Friday.

## 2023-11-01 NOTE — PROGRESS NOTES
is alert and oriented to person, place, and time.    Psychiatric:         Mood and Affect: Mood normal.         Behavior: Behavior normal.

## 2023-11-03 DIAGNOSIS — O36.80X0 PREGNANCY OF UNKNOWN ANATOMIC LOCATION: ICD-10-CM

## 2023-11-03 LAB — HCG SERPL-ACNC: 568 MIU/ML (ref 0–6)

## 2023-11-05 NOTE — RESULT ENCOUNTER NOTE
Appropriate rise in early Pregnancy of unknown location  Give ectopic precautions    Repeat US in about 2.5 -3wks (around 6.5-7wks gestational age) unless concerning changes.

## 2023-11-09 ENCOUNTER — CARE COORDINATION (OUTPATIENT)
Dept: OTHER | Facility: CLINIC | Age: 38
End: 2023-11-09

## 2023-11-09 NOTE — CARE COORDINATION
ACM attempted to reach patient for [facility] transitions call. HIPAA compliant message left requesting a return phone call at patients convenience. Will continue to follow.

## 2023-11-10 ENCOUNTER — CARE COORDINATION (OUTPATIENT)
Dept: OTHER | Facility: CLINIC | Age: 38
End: 2023-11-10

## 2023-11-10 NOTE — CARE COORDINATION
ACM attempted to reach patient for [address] [facility] transitions call. HIPAA compliant message left requesting a return phone call at patients convenience. Will continue to follow.

## 2023-11-20 ENCOUNTER — TELEPHONE (OUTPATIENT)
Dept: OBGYN CLINIC | Age: 38
End: 2023-11-20

## 2023-11-27 ENCOUNTER — TELEPHONE (OUTPATIENT)
Dept: INTERNAL MEDICINE CLINIC | Facility: CLINIC | Age: 38
End: 2023-11-27

## 2023-11-27 ENCOUNTER — OFFICE VISIT (OUTPATIENT)
Dept: OBGYN CLINIC | Age: 38
End: 2023-11-27
Payer: COMMERCIAL

## 2023-11-27 VITALS — DIASTOLIC BLOOD PRESSURE: 70 MMHG | BODY MASS INDEX: 28.72 KG/M2 | SYSTOLIC BLOOD PRESSURE: 120 MMHG | WEIGHT: 157 LBS

## 2023-11-27 DIAGNOSIS — O09.90 HIGH RISK PREGNANCY, ANTEPARTUM: ICD-10-CM

## 2023-11-27 DIAGNOSIS — Z3A.08 8 WEEKS GESTATION OF PREGNANCY: ICD-10-CM

## 2023-11-27 DIAGNOSIS — O09.521 MULTIGRAVIDA OF ADVANCED MATERNAL AGE IN FIRST TRIMESTER: Primary | ICD-10-CM

## 2023-11-27 DIAGNOSIS — F12.10 TETRAHYDROCANNABINOL (THC) USE DISORDER, MILD, ABUSE: ICD-10-CM

## 2023-11-27 DIAGNOSIS — Z80.3 FAMILY HISTORY OF BREAST CANCER: ICD-10-CM

## 2023-11-27 DIAGNOSIS — E28.2 PCOS (POLYCYSTIC OVARIAN SYNDROME): ICD-10-CM

## 2023-11-27 DIAGNOSIS — F41.1 GAD (GENERALIZED ANXIETY DISORDER): ICD-10-CM

## 2023-11-27 DIAGNOSIS — Z86.39 HISTORY OF HYPOTHYROIDISM: ICD-10-CM

## 2023-11-27 DIAGNOSIS — I10 CHRONIC HYPERTENSION: ICD-10-CM

## 2023-11-27 DIAGNOSIS — D18.03 LIVER HEMANGIOMA: ICD-10-CM

## 2023-11-27 DIAGNOSIS — J45.20 MILD INTERMITTENT ASTHMA WITHOUT COMPLICATION: ICD-10-CM

## 2023-11-27 DIAGNOSIS — A60.09 HERPES GENITALIS IN WOMEN: ICD-10-CM

## 2023-11-27 LAB
ABO + RH BLD: NORMAL
ALBUMIN SERPL-MCNC: 4 G/DL (ref 3.5–5)
ALBUMIN/GLOB SERPL: 1.1 (ref 0.4–1.6)
ALP SERPL-CCNC: 61 U/L (ref 50–136)
ALT SERPL-CCNC: 75 U/L (ref 12–65)
AMPHET UR QL SCN: NEGATIVE
ANION GAP SERPL CALC-SCNC: 6 MMOL/L (ref 2–11)
AST SERPL-CCNC: 35 U/L (ref 15–37)
BARBITURATES UR QL SCN: NEGATIVE
BASOPHILS # BLD: 0 K/UL (ref 0–0.2)
BASOPHILS NFR BLD: 1 % (ref 0–2)
BENZODIAZ UR QL: NEGATIVE
BILIRUB SERPL-MCNC: 0.4 MG/DL (ref 0.2–1.1)
BLOOD GROUP ANTIBODIES SERPL: NORMAL
BUN SERPL-MCNC: 10 MG/DL (ref 6–23)
CALCIUM SERPL-MCNC: 9.7 MG/DL (ref 8.3–10.4)
CANNABINOIDS UR QL SCN: POSITIVE
CHLORIDE SERPL-SCNC: 105 MMOL/L (ref 101–110)
CO2 SERPL-SCNC: 24 MMOL/L (ref 21–32)
COCAINE UR QL SCN: NEGATIVE
CREAT SERPL-MCNC: 0.7 MG/DL (ref 0.6–1)
CREAT UR-MCNC: 166 MG/DL
DIFFERENTIAL METHOD BLD: ABNORMAL
EOSINOPHIL # BLD: 0.1 K/UL (ref 0–0.8)
EOSINOPHIL NFR BLD: 1 % (ref 0.5–7.8)
ERYTHROCYTE [DISTWIDTH] IN BLOOD BY AUTOMATED COUNT: 15.1 % (ref 11.9–14.6)
GLOBULIN SER CALC-MCNC: 3.5 G/DL (ref 2.8–4.5)
GLUCOSE SERPL-MCNC: 78 MG/DL (ref 65–100)
HCT VFR BLD AUTO: 41 % (ref 35.8–46.3)
HGB BLD-MCNC: 12.9 G/DL (ref 11.7–15.4)
IMM GRANULOCYTES # BLD AUTO: 0 K/UL (ref 0–0.5)
IMM GRANULOCYTES NFR BLD AUTO: 0 % (ref 0–5)
LDH SERPL L TO P-CCNC: 183 U/L (ref 100–190)
LYMPHOCYTES # BLD: 0.8 K/UL (ref 0.5–4.6)
LYMPHOCYTES NFR BLD: 15 % (ref 13–44)
MCH RBC QN AUTO: 27.9 PG (ref 26.1–32.9)
MCHC RBC AUTO-ENTMCNC: 31.5 G/DL (ref 31.4–35)
MCV RBC AUTO: 88.7 FL (ref 82–102)
METHADONE UR QL: NEGATIVE
MONOCYTES # BLD: 0.7 K/UL (ref 0.1–1.3)
MONOCYTES NFR BLD: 13 % (ref 4–12)
NEUTS SEG # BLD: 4 K/UL (ref 1.7–8.2)
NEUTS SEG NFR BLD: 70 % (ref 43–78)
NRBC # BLD: 0 K/UL (ref 0–0.2)
OPIATES UR QL: NEGATIVE
PCP UR QL: NEGATIVE
PLATELET # BLD AUTO: 228 K/UL (ref 150–450)
PMV BLD AUTO: 11.6 FL (ref 9.4–12.3)
POTASSIUM SERPL-SCNC: 3.8 MMOL/L (ref 3.5–5.1)
PROT SERPL-MCNC: 7.5 G/DL (ref 6.3–8.2)
PROT UR-MCNC: 19 MG/DL
PROT/CREAT UR-RTO: 0.1
RBC # BLD AUTO: 4.62 M/UL (ref 4.05–5.2)
SODIUM SERPL-SCNC: 135 MMOL/L (ref 133–143)
T4 FREE SERPL-MCNC: 1.2 NG/DL (ref 0.78–1.46)
TSH, 3RD GENERATION: 0.14 UIU/ML (ref 0.36–3.74)
URATE SERPL-MCNC: 2.5 MG/DL (ref 2.6–6)
WBC # BLD AUTO: 5.6 K/UL (ref 4.3–11.1)

## 2023-11-27 PROCEDURE — 99215 OFFICE O/P EST HI 40 MIN: CPT | Performed by: OBSTETRICS & GYNECOLOGY

## 2023-11-27 PROCEDURE — 99417 PROLNG OP E/M EACH 15 MIN: CPT | Performed by: OBSTETRICS & GYNECOLOGY

## 2023-11-27 NOTE — TELEPHONE ENCOUNTER
Patient is pregnant and COVID negative. Needs to be seen and there are no open appointments. Patient is not feeling well. Current some day smoker

## 2023-11-27 NOTE — PROGRESS NOTES
CC:  Missed Period      HPI:  41 y.o. [id number] presents for pregnancy confirmation and establish LIZET. Patient's last menstrual period was 2023.,   sure, regular cycles. She has seen [facility] in the past.  Attempted to review Care Everywhere     no n/v  no vb/cramping      Does c/o some new ear pain; no fevers  Instructed to follow-up with her   PCP or urgent care for eval      OB hx:  G1  term  per patient (Dr Carlos Later) --no records in care everywhere dating back this far. G2  term  per patient (Dr Carlos Later) --no records in care everywhere dating back this far  G3 2023 EAB w/ D&C  G4 2023 MAB w/ D&C      x 2 -- pt reports \" no issues\" with either pregnancies. Denies necessitating IOL with either pregnancies  Denies any hx GDM, GHTN/PreE         CHTN:  Diagnosed w/ Dr Lacie Matthews approx 3yrs ago   Was on bps meds in the past-- none in the past 1yr   Baseline HELLP labs ___  Baseline PC ratio ___    ASA 162mg             Hx Liver Hemangioma:  Last imaging CT and Abd US (2022): Impression:  6.3 cm echogenic mass right hepatic lobe. Nonspecific but most likely related to large cavernous hemangioma. Recommend multiphasic hepatic CT or MRI for further characterization. *I do not see a FU MRI that has been performed    Avoid estrogen contracep postpartumtives     LFTs today__          Hx LEEP:  Hx LEEP w/ Dr. Criselda Kunz 2013   No hx PTD but LEEP was performed AFTER her 2 prior SVDs.   Now w/ recent suction D&Cs x2 as well  Watch CL   Mfm referral --if they do not plan to see prior to [address] will get CL around 15-16wks here           AMA:  MFM referral   Gentetics discussed -- pt considering  ASA 162mg              BMI 29 (pregravid), hx PCOS/irregular periods:  Hgb a1c __   Pelvis proven __          Genital HSV:  Takes Valtrex daily for suppression 1000Mg QD per her preference        GORDY, some Depression; hx panic attacks  No longer taking Lexapro--states she was actually was

## 2023-11-28 ENCOUNTER — TELEMEDICINE (OUTPATIENT)
Dept: INTERNAL MEDICINE CLINIC | Facility: CLINIC | Age: 38
End: 2023-11-28
Payer: COMMERCIAL

## 2023-11-28 DIAGNOSIS — J01.00 ACUTE NON-RECURRENT MAXILLARY SINUSITIS: Primary | ICD-10-CM

## 2023-11-28 LAB
EST. AVERAGE GLUCOSE BLD GHB EST-MCNC: 97 MG/DL
HBA1C MFR BLD: 5 % (ref 4.8–5.6)
HBV SURFACE AG SER QL: NONREACTIVE
HCV AB SER QL: NONREACTIVE
HIV 1+2 AB+HIV1 P24 AG SERPL QL IA: NONREACTIVE
HIV 1/2 RESULT COMMENT: NORMAL
RPR SER QL: NONREACTIVE
RUBV IGG SERPL IA-ACNC: 259 IU/ML

## 2023-11-28 PROCEDURE — 99214 OFFICE O/P EST MOD 30 MIN: CPT | Performed by: INTERNAL MEDICINE

## 2023-11-28 RX ORDER — CEPHALEXIN 500 MG/1
500 CAPSULE ORAL 2 TIMES DAILY
Qty: 14 CAPSULE | Refills: 0 | Status: SHIPPED | OUTPATIENT
Start: 2023-11-28 | End: 2023-12-05

## 2023-11-28 ASSESSMENT — ENCOUNTER SYMPTOMS
SHORTNESS OF BREATH: 0
COUGH: 0
APNEA: 0
SINUS PRESSURE: 1
SINUS PAIN: 1

## 2023-11-28 NOTE — PROGRESS NOTES
Tiana Longo, was evaluated through a synchronous (real-time) audio-video encounter. The patient (or guardian if applicable) is aware that this is a billable service, which includes applicable co-pays. This Virtual Visit was conducted with patient's (and/or legal guardian's) consent. Patient identification was verified, and a caregiver was present when appropriate. The patient was located at Home: [address]  Provider was located at [facility] (Appt Dept): [address] [place] [address]      Tiana Longo (:  1985) is a Established patient, presenting virtually for evaluation of the following:  Chief Complaint   Patient presents with    Nasal Congestion     - for Covid. A lot sinus pressure. Pt is Pregnant! Assessment & Plan   Below is the assessment and plan developed based on review of pertinent history, physical exam, labs, studies, and medications. 1. Acute non-recurrent maxillary sinusitis  -     cephALEXin (KEFLEX) 500 MG capsule; Take 1 capsule by mouth 2 times daily for 7 days, Disp-14 capsule, R-0Normal  Discussed with patient supportive measures normal saline solution in each nostril, flonase  , humidifer , increase fluid intake , tylenol , Red flags explained : cough > 4 weeks , fever > 48 hours with not response to tylenol , worsening of symptoms , bloody sputum. , if any o this was advised to come back    Return if symptoms worsen or fail to improve. Subjective   Here for virtual visit for nasal congestion, sinus pressure, started few weeks ago, on and off, but getting worse over the last few days, she has been taking Mucinex which partially helps, denies any fever, has a COVID test that was negative, she reports using Flonase on and off,      Review of Systems   Constitutional:  Positive for fatigue. Negative for activity change, appetite change and fever.    HENT:  Positive for congestion,

## 2023-11-28 NOTE — RESULT ENCOUNTER NOTE
Baseline P:C ratio wnl at 0.1  Baseline HELLP labs and other PN labs still pending ___       UDS + THC    Strongly recommend cessation as Marijuana use is associated with an increased risk of stillbirth, IUGR, decreased attention span/behavioral problems, lower IQ's/impaired cognition, increased sensitivity to drugs of abuse for baby later in lide, and may also disrupt normal brain development/function. Periodic UDS will be performed throughout pregnancy and mandatory reporting requirements to SW and DSS for patients w/ +UDS >/= 24wks pregnancy.

## 2023-11-29 ENCOUNTER — TELEPHONE (OUTPATIENT)
Dept: INTERNAL MEDICINE CLINIC | Facility: CLINIC | Age: 38
End: 2023-11-29

## 2023-11-29 ENCOUNTER — HOSPITAL ENCOUNTER (EMERGENCY)
Age: 38
Discharge: HOME | End: 2023-11-29
Payer: COMMERCIAL

## 2023-11-29 ENCOUNTER — APPOINTMENT (OUTPATIENT)
Dept: GENERAL RADIOLOGY | Age: 38
End: 2023-11-29
Payer: COMMERCIAL

## 2023-11-29 VITALS
TEMPERATURE: 99 F | DIASTOLIC BLOOD PRESSURE: 79 MMHG | RESPIRATION RATE: 16 BRPM | SYSTOLIC BLOOD PRESSURE: 107 MMHG | OXYGEN SATURATION: 100 % | HEART RATE: 90 BPM

## 2023-11-29 DIAGNOSIS — H61.22 IMPACTED CERUMEN OF LEFT EAR: ICD-10-CM

## 2023-11-29 DIAGNOSIS — J01.00 ACUTE NON-RECURRENT MAXILLARY SINUSITIS: Primary | ICD-10-CM

## 2023-11-29 LAB
FLUAV RNA SPEC QL NAA+PROBE: NOT DETECTED
FLUBV RNA SPEC QL NAA+PROBE: NOT DETECTED

## 2023-11-29 PROCEDURE — 99284 EMERGENCY DEPT VISIT MOD MDM: CPT

## 2023-11-29 PROCEDURE — 71046 X-RAY EXAM CHEST 2 VIEWS: CPT

## 2023-11-29 PROCEDURE — 87502 INFLUENZA DNA AMP PROBE: CPT

## 2023-11-29 RX ORDER — HYDROXYZINE PAMOATE 25 MG/1
CAPSULE ORAL
Qty: 90 CAPSULE | Refills: 3 | Status: SHIPPED | OUTPATIENT
Start: 2023-11-29

## 2023-11-29 ASSESSMENT — ENCOUNTER SYMPTOMS
COLOR CHANGE: 0
ABDOMINAL PAIN: 0
SHORTNESS OF BREATH: 1
SINUS PRESSURE: 1
CHEST TIGHTNESS: 1
NAUSEA: 0
VOMITING: 0
DIARRHEA: 0

## 2023-11-29 NOTE — DISCHARGE INSTRUCTIONS
Flu negative. Chest x-ray looks good. Continue with previously prescribed antibiotic (Keflex). I have provided a prescription for eardrops that will hopefully loosen some of the impacted earwax. Recommend picking up some nasal decongestion such as oxymetazoline (Afrin). This is available over-the-counter. Do not use this for more than 3 to 5 days. Plan to follow-up with your primary doctor in another 2 to 3 days for recheck. Please return with any worsening symptoms or concerns in the interim.

## 2023-11-29 NOTE — ED PROVIDER NOTES
Emergency Department Provider Note       PCP: Verónica Richards MD   Age: 41 y.o. Sex: female     DISPOSITION Decision To Discharge 11/29/2023 04:11:44 PM       ICD-10-CM    1. Acute non-recurrent maxillary sinusitis  J01.00       2. Impacted cerumen of left ear  H61.22           Medical Decision Making     Complexity of Problems Addressed:  1 stable acute illness    Data Reviewed and Analyzed:  I independently ordered and reviewed each unique test.         I interpreted the X-rays no acute findings. Discussion of management or test interpretation. Patient presents with symptoms compatible with likely viral sinusitis, onset 2 to 3 days ago at being exposed to some sick family members over the Thanksgiving holidays. She is about 8 weeks pregnant. Was getting some relief from oral decongestions but told to stop due to pregnancy concerns. She was prescribed Keflex which she started yesterday without significant improvement. Triage note did state some chest tightness with exertion which the patient denies to me. She arrives vitally stable in no acute distress. She is afebrile, saturating 100% on room air. Right TM is clear. Left TM obscured by impacted cerumen. Oropharynx is clear. Lungs appear clear. She does have some ethmoidal sinus tenderness. We will plan to check influenza swab, chest x-ray. Did not feel lab or EKG were necessary as I doubt cardiac process. Suspect chest tightness is secondary to upper respiratory symptomatology. We will attempt otic irrigation. Chest x-ray ultimately clear. Influenza negative. Patient with no significant improvement after ear irrigation. TM partially visualized, appears normal.  We will plan to discharge home. Patient encouraged to continue previously prescribed antibiotic. I recommended a nasal decongestant and lieu of the systemic agent due to pregnancy concerns.   Medication use discussed, advised against use for more than 3 to 5

## 2023-11-29 NOTE — ED TRIAGE NOTES
Pt reports sinus pressure and ear congestion in left ear. Pt took sudafed and mucinex Monday and felt better. Pt was then told to stop taking sudafed yesterday and started taking another medicine. Pt reports starting feeling chest pressure that worsens with exertion. Pt reports being sob. Pt reports bilateral ear pain now. Pt took Covid test at home Monday and was negative.

## 2023-11-29 NOTE — TELEPHONE ENCOUNTER
Pt had a virtual on yesterday and was given medication. Today having shortness of breath, discomfort in chest, wheezing

## 2023-11-29 NOTE — RESULT ENCOUNTER NOTE
TSH low most likely due to pregnancy; free T4 wnl. No meds indicated at this time.   Repeat TFTs every trimester     Hgb A1C 5.0 (wnl)    Baseline HELLP labs wnl other than mildly elevated ALT at 75; repeat CMP at [facility] __ (known liver hemangioma)  Baseline P:C ratio pending    Rest of PN labs wnl so far --- a few still pending

## 2023-11-30 LAB
BACTERIA SPEC CULT: NORMAL
BACTERIA SPEC CULT: NORMAL
SERVICE CMNT-IMP: NORMAL

## 2023-12-01 ENCOUNTER — CARE COORDINATION (OUTPATIENT)
Dept: OTHER | Facility: CLINIC | Age: 38
End: 2023-12-01

## 2023-12-01 NOTE — CARE COORDINATION
system:  partner  Return to work planning? yes      2nd and 3rd Trimester Focused Assessment:   Healthy behavior review  Red flags: bleeding/leaking  Labor signs and symptoms-none      Pt doing well thus far, she had her first ob prenatal appointment. She will be planning to follow-up for her additional ob visits with [facility]. Pt has some things to work on. She has a hx of thc usage and she was encouraged to discontinue use. Pt has reported that she has stopped. Risk discussed with the pt. Pt also has had thyroid concerns prior to pregnancy this will be monitored as well. The pt also has some anxiety concerns and we have discussed some ways for her to cope that daigle snot involve taking medications. Suggested exercise and medication to try these out. Will continue to follow. Discussed the BWWB program and will send this info out to pt once she is past 20w. Pt is thinking that she might breast feed the baby. Patient given an opportunity to ask questions and does not have any further questions or concerns at this time. Were discharge instructions available to patient? Yes. Reviewed appropriate site of care based on symptoms and resources available to patient including: Benefits related nurse triage line  When to call 911  Condition related references. The patient agrees to contact the OB/Gyn office for questions related to their healthcare. Plan for follow-up call in 10-14 days based on severity of symptoms and risk factors.   Plan for next call: self management-12/13/23 f/u ob visit

## 2023-12-04 PROBLEM — E28.2 PCOS (POLYCYSTIC OVARIAN SYNDROME): Status: ACTIVE | Noted: 2023-12-04

## 2023-12-04 PROBLEM — O09.529 AMA (ADVANCED MATERNAL AGE) MULTIGRAVIDA 35+: Status: ACTIVE | Noted: 2023-12-04

## 2023-12-04 PROBLEM — O09.90 HIGH RISK PREGNANCY, ANTEPARTUM: Status: ACTIVE | Noted: 2023-12-04

## 2023-12-04 PROBLEM — J45.20 MILD INTERMITTENT ASTHMA WITHOUT COMPLICATION: Status: ACTIVE | Noted: 2023-12-04

## 2023-12-04 PROBLEM — Z86.39 HISTORY OF HYPOTHYROIDISM: Status: ACTIVE | Noted: 2023-12-04

## 2023-12-04 PROBLEM — F41.1 GAD (GENERALIZED ANXIETY DISORDER): Status: ACTIVE | Noted: 2022-10-17

## 2023-12-07 ENCOUNTER — NURSE ONLY (OUTPATIENT)
Dept: OBGYN CLINIC | Age: 38
End: 2023-12-07

## 2023-12-07 DIAGNOSIS — O09.511 SUPERVISION OF ELDERLY PRIMIGRAVIDA IN FIRST TRIMESTER: ICD-10-CM

## 2023-12-07 RX ORDER — MULTIVIT-MIN/IRON/FOLIC ACID/K 18-600-40
CAPSULE ORAL
COMMUNITY

## 2023-12-07 NOTE — PROGRESS NOTES
NOB consult with pt. Labs today: none - prenatal panel, TSH/T4, and baseline HELLP labs drawn on , pt is too early for NIPT - desires to return next week for testing. Offered pt the option of CF, SMA, and Fragile X carrier testing. Pt declines testing. Offered pt the option of genetic screening (1st screen vs Tetra vs NIPT). Pt desires - too early to draw today, pt is only 9w4d by official dating. Pt states she wants this before visit with M, notified she can come next week as she will be over 10 weeks for lab only visit. Pt opted to do this, states she will go to [place] lab (kit brought to lab, order placed this visit). Instructed pt on exercise/nutrition in pregnancy. Reviewed [facility] preg book. Advised pt on using [facility] for hospital needs and [facility] L&D for pregnancy related emergencies. Pt states understanding. NOB forms signed, scanned, and given to pt. Medical Hx: CHTN (baseline HELLP labs performed 23 - WNL), Hx Liver Hemangioma, AMA, hx PCOS/irregular periods, Genital HSV (on Valtrex daily), GORDY, some Depression (states she is not on lexapro, has not picked up vistaril prescribed at last visit yet, encouraged to  and start medication as needed) hx panic attacks, Asthma (inhaler as needed), Hypothyroid (TSH/T4 WNL for pregnancy on 23 - will check each trimester), Hx THC use (positive UDS on 23 - notified will be getting random UDS for remainder of pregnancy)    Surgical Hx: D&C x1, LEEP x1 (will get CL between 15-16 weeks - will schedule at 24 visit with Dr Murray Urbano)    Last Pap: 8/3/23 - WNL     Pt OB c/o: none      Fam hx any chromosomal or inheritable disorders: none     COVID Vaccine:     OB hx:   G1  term  per patient (Dr Anil Church) --no records in care everywhere dating back this far.    G2  term  per patient (Dr Anil Church) --no records in care everywhere dating back this far  G3 2023 EAB w/ D&C - at visit today pt denied having and EAB, and states she

## 2023-12-11 ENCOUNTER — CARE COORDINATION (OUTPATIENT)
Dept: OTHER | Facility: CLINIC | Age: 38
End: 2023-12-11

## 2023-12-11 NOTE — CARE COORDINATION
ACM attempted to reach patient for [facility] transitions call. HIPAA compliant message left requesting a return phone call at patients convenience. Will continue to follow. Pt called me back  Overall she is doing well, she is having some concerns of being hot. Discussed with pt she should take her temp when she gets a moment. Maybe related to her anxiety, she is no longer taking her gummies to assist with this and she still has not decided on the medication prescribed by her ob yet. Provided education around this and other non medication methods to assist the pt. She is now 10w. Due to maternal age she will follow-up with maternal fetal at next appointment. Pt did not have any additional questions. Will continue to follow. Call within 2 business days of discharge: Yes     Patient Current Location: [address] [place] [address]       Date Complaint Diagnosis Description Type Department Provider    23 Shortness of Breath; Otalgia Acute non-recurrent maxillary sinusitis . .. ED (DISCHARGE) [facility]        Maternity Care Manager contacted the patient by telephone to discuss the maternity management program.  Patient agrees to care management services at this time. Verified name and  with patient as identifiers. Risk Factors Identified:  adv mat age/previous rec drug use/mh      Needs to be reviewed by the provider   none         Method of communication with provider : none    Advance Care Planning:   Does patient have an Advance Directive:  not on file. Does patient have OB/Gyn Selected? Yes    Discussed follow up appointments.  If no appointment was previously scheduled, appointment scheduling offered: Yes  [facility] follow up appointment(s):   Future Appointments   Date Time Provider [address]   2023  8:30 AM [facility] ULTRASOUND 2 [facility] AMB   2023  9:15 AM Idalia Rizzo MD [facility] AMB   2024 12:30 PM Claudeen Kanner, MD [facility] AMB

## 2023-12-29 ENCOUNTER — ROUTINE PRENATAL (OUTPATIENT)
Dept: OBGYN CLINIC | Age: 38
End: 2023-12-29
Payer: COMMERCIAL

## 2023-12-29 VITALS — DIASTOLIC BLOOD PRESSURE: 75 MMHG | SYSTOLIC BLOOD PRESSURE: 107 MMHG

## 2023-12-29 DIAGNOSIS — O99.341 ANXIETY DURING PREGNANCY IN FIRST TRIMESTER, ANTEPARTUM: ICD-10-CM

## 2023-12-29 DIAGNOSIS — O09.521 HIGH RISK PREGNANCY, MULTIGRAVIDA OF ADVANCED MATERNAL AGE IN FIRST TRIMESTER: ICD-10-CM

## 2023-12-29 DIAGNOSIS — O34.41 HISTORY OF LOOP ELECTROSURGICAL EXCISION PROCEDURE (LEEP) OF CERVIX AFFECTING PREGNANCY IN FIRST TRIMESTER: ICD-10-CM

## 2023-12-29 DIAGNOSIS — Z87.42 HISTORY OF POLYCYSTIC OVARIAN SYNDROME: ICD-10-CM

## 2023-12-29 DIAGNOSIS — O10.919 CHRONIC HYPERTENSION AFFECTING PREGNANCY: ICD-10-CM

## 2023-12-29 DIAGNOSIS — F41.9 ANXIETY DURING PREGNANCY IN FIRST TRIMESTER, ANTEPARTUM: ICD-10-CM

## 2023-12-29 DIAGNOSIS — Z36.82 NUCHAL TRANSLUCENCY OF FETUS ON PRENATAL ULTRASOUND: ICD-10-CM

## 2023-12-29 DIAGNOSIS — Z3A.12 12 WEEKS GESTATION OF PREGNANCY: ICD-10-CM

## 2023-12-29 DIAGNOSIS — Z98.890 HISTORY OF LOOP ELECTROSURGICAL EXCISION PROCEDURE (LEEP) OF CERVIX AFFECTING PREGNANCY IN FIRST TRIMESTER: ICD-10-CM

## 2023-12-29 DIAGNOSIS — Z86.39 HISTORY OF HYPOTHYROIDISM: ICD-10-CM

## 2023-12-29 DIAGNOSIS — A60.09 GENITAL HERPES SIMPLEX VIRUS (HSV) INFECTION IN MOTHER AFFECTING PREGNANCY: ICD-10-CM

## 2023-12-29 DIAGNOSIS — J45.20 MILD INTERMITTENT ASTHMA WITHOUT COMPLICATION: ICD-10-CM

## 2023-12-29 DIAGNOSIS — O09.521 MULTIGRAVIDA OF ADVANCED MATERNAL AGE IN FIRST TRIMESTER: Primary | ICD-10-CM

## 2023-12-29 DIAGNOSIS — L29.9 PRURITIC DISORDER: ICD-10-CM

## 2023-12-29 DIAGNOSIS — F12.90 MARIJUANA USE DURING PREGNANCY: ICD-10-CM

## 2023-12-29 DIAGNOSIS — O98.319 GENITAL HERPES SIMPLEX VIRUS (HSV) INFECTION IN MOTHER AFFECTING PREGNANCY: ICD-10-CM

## 2023-12-29 DIAGNOSIS — O09.90 HIGH RISK PREGNANCY, ANTEPARTUM: ICD-10-CM

## 2023-12-29 DIAGNOSIS — I10 CHRONIC HYPERTENSION: ICD-10-CM

## 2023-12-29 DIAGNOSIS — O99.320 MARIJUANA USE DURING PREGNANCY: ICD-10-CM

## 2023-12-29 PROBLEM — E78.2 MIXED HYPERLIPIDEMIA: Status: RESOLVED | Noted: 2022-11-01 | Resolved: 2023-12-29

## 2023-12-29 PROBLEM — E04.0 GOITER DIFFUSE: Status: RESOLVED | Noted: 2017-06-09 | Resolved: 2023-12-29

## 2023-12-29 PROBLEM — Z80.3 FAMILY HISTORY OF BREAST CANCER: Status: RESOLVED | Noted: 2019-02-15 | Resolved: 2023-12-29

## 2023-12-29 PROBLEM — J30.89 ENVIRONMENTAL AND SEASONAL ALLERGIES: Status: RESOLVED | Noted: 2023-10-26 | Resolved: 2023-12-29

## 2023-12-29 PROCEDURE — 76801 OB US < 14 WKS SINGLE FETUS: CPT | Performed by: OBSTETRICS & GYNECOLOGY

## 2023-12-29 PROCEDURE — 3078F DIAST BP <80 MM HG: CPT | Performed by: OBSTETRICS & GYNECOLOGY

## 2023-12-29 PROCEDURE — 76813 OB US NUCHAL MEAS 1 GEST: CPT | Performed by: OBSTETRICS & GYNECOLOGY

## 2023-12-29 PROCEDURE — 3074F SYST BP LT 130 MM HG: CPT | Performed by: OBSTETRICS & GYNECOLOGY

## 2023-12-29 PROCEDURE — 99204 OFFICE O/P NEW MOD 45 MIN: CPT | Performed by: OBSTETRICS & GYNECOLOGY

## 2023-12-29 PROCEDURE — 96127 BRIEF EMOTIONAL/BEHAV ASSMT: CPT | Performed by: OBSTETRICS & GYNECOLOGY

## 2023-12-29 RX ORDER — ONDANSETRON 4 MG/1
8 TABLET, ORALLY DISINTEGRATING ORAL EVERY 8 HOURS PRN
Qty: 30 TABLET | Refills: 2 | Status: SHIPPED | OUTPATIENT
Start: 2023-12-29

## 2023-12-29 NOTE — PATIENT INSTRUCTIONS
Pruritus in pregnancy is a common complaint. Without definitive etiology, it should be treated symptomatically. Optimize avoidance of environmental triggers (soaps, detergents, etc). First line therapies may include antihistamines (benadryl, zyrtec/claritin, atarax/vistaril Rx), aveeno oatmeal baths, grandpas/Dr Squatch pine tar soaps. .   Soaps and lotions should be hypoallergenic. Suggested brands include cerave and cetaphil. Consideration of hydrating ointments for moisturizing, ex eucerin, vanicream, or cerave ointments   Hydrocortisone ointment (not cream) if fails other options. If requires increased dose of steroids, prescription triamcinolone ointment recommended. Resources for Depression/Anxiety  Postpartum Support International (PSI). PSI Warmline:  0-369-630-4PPD (6970). WWW. POSTPARTUM. NET    Mom's IMPACTT  [email or web address]

## 2023-12-29 NOTE — PROGRESS NOTES
[address] [place] [address] [address]    0 [facility], [address] [place] [address]  p- [phone]  [phone]         MFM Consultation    Presents for evaluation of the following chief complaint(s):   Chief Complaint   Patient presents with    Pregnancy Ultrasound    High Risk Pregnancy     NT, AMA, CHTN, Anxiety, Hx ORALIA Carlos (1985) is a 41 y.o. [id number] at 12w5d with 7/7/2024, by Last Menstrual Period. Patient is working full time at [employer] for [employer]. Pt is scheduled to see Primary OB ([address] [place] [address] Avenue) on 1/2/24. No HAs, edema. No bleeding, LOF, cramping, ctxs, or vaginal pressure. Rash and itching for last several weeks- stopped fabric softener, but recently changed soap. Has allergies and hasn't been taking meds. Hx of asthma, stable. Did not have this issue in prior pregnancies. Has anxiety and Rx for vistaril, hasn't tried it. Recommend starting allergy med, decrease exposures. Suggestions shared via [employer]. Saw her therapist of 1yr for last time this week; needs new one due to insurance change. Does not desire SSRI type med at this time. But is interested in therapy resources. Will have Alexandr  reach out. [employer] shared. Mood evaluated today based on discussion with pt and PHQ screen. 11/1/2023     2:21 PM   PHQ-9    Little interest or pleasure in doing things 0   Feeling down, depressed, or hopeless 0   PHQ-2 Score 0   PHQ-9 Total Score 0      Mood Reassuring today  Recommend lifestyle/behavioral modifications to enhance mood (exercise, sunshine, mood apps, nutritional modifications, etc). Offered OB Care Coordination with Liam Flores LCSW to aid in obtaining mental health care. Personal and family history reviewed and updated as indicated.      Addressed normal pregnancy complaints, reassured and offered suggestions for care  Reviewed gestational age precautions and activity goals/limitations  Nutritional counseling as well as

## 2023-12-29 NOTE — ASSESSMENT & PLAN NOTE
Anxiety and Depression  The approach to depression and anxiety in pregnancy must look at the whole maternal-child cohort to assess risks and benefits.  depression is associated with an increased risk of multiple poor obstetrical outcomes, including spontaneous , bleeding, operative deliveries, and  birth. In a nationally representative survey in the [facility] that identified pregnant women with major depression, only 50 percent received treatment. Untreated disease causes maternal suffering and is associated with poor nutrition, comorbid substance use disorders, poor adherence with prenatal care, postpartum depression, impaired relationships between the mother and her infant and other family members, and an increased risk of suicide. It is important to assess the benefit of previous treatment in order to guide treatment selection. If psychotherapy is indicated and the patient was successfully treated with a particular psychotherapy prior to pregnancy, the same therapy is used during pregnancy. Similarly, if pharmacotherapy is indicated and the patient was successfully treated with a particular antidepressant prior to pregnancy, the same drug is used during pregnancy. The risks of untreated moderate to severe maternal major depression, to both the mother and fetus, often outweigh the risks associated with antidepressants. A national registry study (nearly 1,300,000 births) compared infants who were exposed to SSRIs in early pregnancy (n >10,000) with infants not exposed. After adjusting for potential confounds (eg, maternal age, smoking, and body mass index), the analyses found that the risk of severe congenital malformations was comparable in the two groups. Antidepressant drug doses may need to be increased as the pregnancy progresses, especially during the third trimester.  There is no evidence that tapering or discontinuing antidepressants at term reduces the
Cervical surveillance at 16 weeks and serially until 28-32 weeks for concerns/symptoms. FFN prior to transvaginal ultrasounds and as indicated beginning at 24 weeks. This does not need to be sent to the lab unless clinical concerns. PTL and cervical insufficiency precautions given.
History of Genital Herpes    Patients with recurrent genital herpes are at risk of an outbreak at the time of labor. The newborns in these cases are at a low risk of denis the herpes virus due to immunity passed from the mother. Among women with recurrent lesions at the time of delivery, the rate of transmission with a vaginal delivery is only 3%. For women with a history of recurrent disease and no visible lesions at delivery, the transmission risk has been estimated to be 2/10,000. The low risk is in part attributed to the presence and transplacental passage of antiherpes antibodies.  delivery is not indicated in women with a history of HSV in the absence of active genital lesions or prodromes. Acyclovir and Valacyclovir have been repeatedly shown to decrease the incidence of recurrent HSV outbreak at the time of labor reducing the incidence of  delivery. The routine use of antiviral suppression in the third trimester has become the recommendation of ACOG in patients with a history of genital herpes. Valtrex suppression with 500 mgs po bid starting at 36 weeks is an acceptable option for the patient who is worried about a recurrent episode and wants to avoid a  delivery.
Low dose Aspirin  mg daily is recommended to be started at 12-16 weeks (some benefit seen with starting up to 28 weeks) for the prevention of preeclampsia  in high risk women. Consider stopping Aspirin at 36 weeks. Recommend Vitamin D 2000IU daily and Calcium 1000mg daily to aid in protection of bones and teeth. Recommend use of PNV daily with well-balanced diet. Unless instructed otherwise, recommend continuation of physical activity throughout pregnancy. Genetic counseling was performed by physician after reviewing patient's genetic history. The patient's Down syndrome age associated risk, as well as, risks of additional aneuploidy and genetic syndromes, are reduced by approximately 50% with a normal first trimester anatomy including, nuchal translucency and nasal bone. Ultrasound alone does not rule out all abnormalities of genetics and development. Maternal serum screening for aneuploidy was discussed with the patient including first trimester ARIELLA-A/hCG, second trimester Quad screen (either in isolation or sequential with ARIELLA-A) as well as non-invasive prenatal testing (NIPT) for aneuploidy from a maternal blood sample. Positive predictive and negative predictive values for these tests were explained, questions answered. Patient understands that these are screening tests that only assesses risk for select abnormalities (trisomies 15, 25, and 21, and sex chromosome abnormalities (NIPT), as well as markers for placental health (ARIELLA-A) and risk for open neural tube defects (quad)). NIPT is designed for high risk populations, but should be considered by all patients who desire the current best option for screening for applicable genetic abnormalities. Limitations of technology discussed based on maternal age, technical aspects of tests, and maternal BMI reviewed. All questions answered and concerns discussed.      Patient elected to proceed with NIPT previously at [facility] office- results
Pruritus in pregnancy is a common complaint. Without definitive etiology, it should be treated symptomatically. Optimize avoidance of environmental triggers (soaps, detergents, etc). First line therapies may include antihistamines (benadryl, zyrtec/claritin, atarax/vistaril Rx), aveeno oatmeal baths, grandpas/Dr Squatch pine tar soaps. .   Soaps and lotions should be hypoallergenic. Suggested brands include cerave and cetaphil. Consideration of hydrating ointments for moisturizing, ex eucerin, vanicream, or cerave ointments   Hydrocortisone ointment (not cream) if fails other options. If requires increased dose of steroids, prescription triamcinolone ointment recommended.
postpartum.    Recommend 20mg PO lasix x 5 days beginning PPD #1

## 2024-01-02 ENCOUNTER — ROUTINE PRENATAL (OUTPATIENT)
Dept: OBGYN CLINIC | Age: 39
End: 2024-01-02
Payer: MEDICAID

## 2024-01-02 ENCOUNTER — FOLLOWUP TELEPHONE ENCOUNTER (OUTPATIENT)
Dept: CASE MANAGEMENT | Age: 39
End: 2024-01-02

## 2024-01-02 VITALS — SYSTOLIC BLOOD PRESSURE: 100 MMHG | BODY MASS INDEX: 28.9 KG/M2 | WEIGHT: 158 LBS | DIASTOLIC BLOOD PRESSURE: 60 MMHG

## 2024-01-02 DIAGNOSIS — A60.09 GENITAL HERPES SIMPLEX VIRUS (HSV) INFECTION IN MOTHER AFFECTING PREGNANCY: ICD-10-CM

## 2024-01-02 DIAGNOSIS — F99 MENTAL DISORDER: ICD-10-CM

## 2024-01-02 DIAGNOSIS — Z3A.13 13 WEEKS GESTATION OF PREGNANCY: ICD-10-CM

## 2024-01-02 DIAGNOSIS — Z86.39 HISTORY OF HYPOTHYROIDISM: ICD-10-CM

## 2024-01-02 DIAGNOSIS — Z87.42 HISTORY OF POLYCYSTIC OVARIAN SYNDROME: ICD-10-CM

## 2024-01-02 DIAGNOSIS — Z80.3 FAMILY HISTORY OF BREAST CANCER: ICD-10-CM

## 2024-01-02 DIAGNOSIS — Z98.890 HISTORY OF LOOP ELECTROSURGICAL EXCISION PROCEDURE (LEEP) OF CERVIX AFFECTING PREGNANCY IN FIRST TRIMESTER: ICD-10-CM

## 2024-01-02 DIAGNOSIS — N89.8 VAGINAL ODOR: ICD-10-CM

## 2024-01-02 DIAGNOSIS — L29.9 PRURITIC CONDITION: ICD-10-CM

## 2024-01-02 DIAGNOSIS — O09.521 MULTIGRAVIDA OF ADVANCED MATERNAL AGE IN FIRST TRIMESTER: ICD-10-CM

## 2024-01-02 DIAGNOSIS — N89.8 VAGINAL ITCHING: ICD-10-CM

## 2024-01-02 DIAGNOSIS — O09.90 HIGH RISK PREGNANCY, ANTEPARTUM: ICD-10-CM

## 2024-01-02 DIAGNOSIS — I10 CHRONIC HYPERTENSION: ICD-10-CM

## 2024-01-02 DIAGNOSIS — O99.341 ANXIETY DURING PREGNANCY IN FIRST TRIMESTER, ANTEPARTUM: ICD-10-CM

## 2024-01-02 DIAGNOSIS — F12.90 MARIJUANA USE DURING PREGNANCY: ICD-10-CM

## 2024-01-02 DIAGNOSIS — R74.8 ELEVATED LIVER ENZYMES: Primary | ICD-10-CM

## 2024-01-02 DIAGNOSIS — D18.03 LIVER HEMANGIOMA: ICD-10-CM

## 2024-01-02 DIAGNOSIS — F41.9 ANXIETY DURING PREGNANCY IN FIRST TRIMESTER, ANTEPARTUM: ICD-10-CM

## 2024-01-02 DIAGNOSIS — J45.20 MILD INTERMITTENT ASTHMA WITHOUT COMPLICATION: ICD-10-CM

## 2024-01-02 DIAGNOSIS — O98.319 GENITAL HERPES SIMPLEX VIRUS (HSV) INFECTION IN MOTHER AFFECTING PREGNANCY: ICD-10-CM

## 2024-01-02 DIAGNOSIS — O34.41 HISTORY OF LOOP ELECTROSURGICAL EXCISION PROCEDURE (LEEP) OF CERVIX AFFECTING PREGNANCY IN FIRST TRIMESTER: ICD-10-CM

## 2024-01-02 DIAGNOSIS — O10.919 CHRONIC HYPERTENSION AFFECTING PREGNANCY: ICD-10-CM

## 2024-01-02 DIAGNOSIS — O99.320 MARIJUANA USE DURING PREGNANCY: ICD-10-CM

## 2024-01-02 DIAGNOSIS — O09.521 HIGH RISK PREGNANCY, MULTIGRAVIDA OF ADVANCED MATERNAL AGE IN FIRST TRIMESTER: ICD-10-CM

## 2024-01-02 LAB
ALBUMIN SERPL-MCNC: 3.3 G/DL (ref 3.5–5)
ALBUMIN/GLOB SERPL: 1.1 (ref 0.4–1.6)
ALP SERPL-CCNC: 51 U/L (ref 50–136)
ALT SERPL-CCNC: 41 U/L (ref 12–65)
ANION GAP SERPL CALC-SCNC: 2 MMOL/L (ref 2–11)
AST SERPL-CCNC: 17 U/L (ref 15–37)
BILIRUB SERPL-MCNC: 0.2 MG/DL (ref 0.2–1.1)
BUN SERPL-MCNC: 12 MG/DL (ref 6–23)
CALCIUM SERPL-MCNC: 9.3 MG/DL (ref 8.3–10.4)
CHLORIDE SERPL-SCNC: 106 MMOL/L (ref 103–113)
CO2 SERPL-SCNC: 27 MMOL/L (ref 21–32)
CREAT SERPL-MCNC: 0.5 MG/DL (ref 0.6–1)
GLOBULIN SER CALC-MCNC: 3.1 G/DL (ref 2.8–4.5)
GLUCOSE SERPL-MCNC: 77 MG/DL (ref 65–100)
POTASSIUM SERPL-SCNC: 3.7 MMOL/L (ref 3.5–5.1)
PROT SERPL-MCNC: 6.4 G/DL (ref 6.3–8.2)
SODIUM SERPL-SCNC: 135 MMOL/L (ref 136–146)

## 2024-01-02 PROCEDURE — 3078F DIAST BP <80 MM HG: CPT | Performed by: OBSTETRICS & GYNECOLOGY

## 2024-01-02 PROCEDURE — 99215 OFFICE O/P EST HI 40 MIN: CPT | Performed by: OBSTETRICS & GYNECOLOGY

## 2024-01-02 PROCEDURE — 3074F SYST BP LT 130 MM HG: CPT | Performed by: OBSTETRICS & GYNECOLOGY

## 2024-01-02 NOTE — TELEPHONE ENCOUNTER
Request received from [facility] and OB (Dr. Jones) to reach out to patient.  Phone call placed to patient at [phone].    Patient was previously working with a therapist for 1.5 years, but her insurance has now changed to straight Medicaid, and she is in need of a new therapist.  Patient plans to enroll in [place] [employer].  Information sent to patient via text on Jennifer Shi ([facility]) as Jennifer is trained in  mood disorders and accepts both straight Medicaid and [place] [employer].    Patient was previously prescribed Lexapro, but she did not take this medication consistently.  Patient is currently prescribed Vistaril PRN, but \"I feel like I take it too much - it doesn't work until the middle of the night.\"    SW educated patient on mental health support available thru [facility]'s [employer] Program ([phone]).  Patient agreeable for  to provide contact information for program.  Patient is aware that she is to contact program for additional information.  She will then be contacted by a [facility]  Care Coordinator to be enrolled in support services.  SW also provided information on free virtual support groups available thru [employer].      Patient denied any additional needs at this time and is receptive to receiving another call next week to check in.  SW also encouraged patient to reach out if any needs/questions arise in the meantime.    Jennifer Lowery, ZAYNAB-ANA, [facility]-C  [facility]   [phone]

## 2024-01-02 NOTE — PROGRESS NOTES
LAUREL  13w2d by d=8wk US  Denies LOF, VB     Last Pap: 8/3/23 - WNL   STD cxs only today     Also c/o odor, some mild vaginal itching    Nuswab  for all today       Pruritus for a few weeks \"all over\" her body  No rashes, hives, etc  See MFM last note   Changed soaps back to what she typically uses and itching is \"not as bad but still there\"  Benadryl recommended  Will recheck LFTs and baseline FBAs __  Recommend FU w/ PCP and/or Derm assuming all wnl          Repeat urine culture at [facility] (contaminated on intake)       Genetics: Low Risk NIPT, male; declines Carrier Screening   Normal NT w/ MFM        OB hx:  G1  term  per patient (Dr Carlton) --no records in care everywhere dating back this far.  (8#15)   G2  term  per patient (Dr Carlton) --no records in care everywhere dating back this far (7#15#)   G3 2023 EAB w/ D&C  G4 2023 MAB w/ D&C       x 2 -- pt reports \" no issues\" with either pregnancies.  Denies necessitating IOL with either pregnancies  Denies any hx GDM, GHTN/PreE            CHTN:  Diagnosed w/ Dr West approx 3yrs ago   Was on bps meds in the past-- none in the past 1yr   Baseline HELLP labs wnl other than mildly elevated ALT at 75; repeat CMP today ___  (known liver hemangioma)   Baseline PC ratio 0.1  ASA 162mg     1/3/23:  bp wnl          Hx Liver Hemangioma:  Last imaging CT and Abd US (2022):  Impression:  6.3 cm echogenic mass right hepatic lobe. Nonspecific but most likely related to large cavernous hemangioma. Recommend multiphasic hepatic CT or MRI for further characterization.   *I do not see a FU MRI that has been performed     Avoid estrogen contracep postpartumtives     Repeat LFTs today__              Hx LEEP:  Hx LEEP w/ Dr. Squires 2013   No hx PTD but LEEP was performed AFTER her 2 prior SVDs.  Now w/ recent suction D&Cs x2 as well  Watch CL   Mfm referral --if they do not plan to see prior to BERTHA US will get TVUS for CL at 16wks here ___

## 2024-01-03 LAB — BILE AC SERPL-SCNC: 7.3 UMOL/L (ref 0–10)

## 2024-01-03 NOTE — RESULT ENCOUNTER NOTE
LFTs now normalized   Bile acids wnl   FU w/ dermatology regarding pruritus     Nuswab still pending

## 2024-01-04 LAB
A VAGINAE DNA VAG QL NAA+PROBE: NORMAL SCORE
BVAB2 DNA VAG QL NAA+PROBE: NORMAL SCORE
C ALBICANS DNA VAG QL NAA+PROBE: NEGATIVE
C GLABRATA DNA VAG QL NAA+PROBE: NEGATIVE
C TRACH RRNA SPEC QL NAA+PROBE: NEGATIVE
CANDIDA KRUSEI: NEGATIVE
CANDIDA LUSITANIAE, NAA: NEGATIVE
CANDIDA PARAPSILOSIS/TROPICALIS: NEGATIVE
MEGA1 DNA VAG QL NAA+PROBE: NORMAL SCORE
N GONORRHOEA RRNA SPEC QL NAA+PROBE: NEGATIVE
T VAGINALIS RRNA SPEC QL NAA+PROBE: NEGATIVE

## 2024-01-04 NOTE — RESULT ENCOUNTER NOTE
Nuswab neg for all but BV \"indeterminate\"    If still having symptoms of vaginal odor, slight vaginal itching/irritation  would go ahead and send Rx for Flagyl 500 BID x 7 days.

## 2024-01-05 DIAGNOSIS — N89.8 VAGINAL ODOR: Primary | ICD-10-CM

## 2024-01-05 NOTE — TELEPHONE ENCOUNTER
Patient has been notified. Please send RX per Provider order on result note: send Rx for Flagyl 500 BID x 7 days     Pt asked if she could take the medication due to her allergies I verified her allergies being diflucan and propanolol. Pt verified as well.

## 2024-01-08 RX ORDER — METRONIDAZOLE 500 MG/1
500 TABLET ORAL 2 TIMES DAILY
Qty: 14 TABLET | Refills: 0 | Status: SHIPPED | OUTPATIENT
Start: 2024-01-08 | End: 2024-01-15

## 2024-01-10 ENCOUNTER — FOLLOWUP TELEPHONE ENCOUNTER (OUTPATIENT)
Dept: CASE MANAGEMENT | Age: 39
End: 2024-01-10

## 2024-01-10 NOTE — TELEPHONE ENCOUNTER
Phone call to patient at [phone] to check-in.    No answer; message left requesting call back.    ZAYNAB Vela-ANA, [facility]-C  [facility]   [phone]

## 2024-01-11 ENCOUNTER — CARE COORDINATION (OUTPATIENT)
Dept: OTHER | Facility: CLINIC | Age: 39
End: 2024-01-11

## 2024-01-15 ENCOUNTER — FOLLOWUP TELEPHONE ENCOUNTER (OUTPATIENT)
Dept: CASE MANAGEMENT | Age: 39
End: 2024-01-15

## 2024-01-22 ENCOUNTER — TELEPHONE (OUTPATIENT)
Dept: INTERNAL MEDICINE CLINIC | Facility: CLINIC | Age: 39
End: 2024-01-22

## 2024-01-22 NOTE — TELEPHONE ENCOUNTER
Pt called states she has had a headache for couple weeks off and on.  She is 18 weeks pregnant.  OB told her to call PCP.   She wants an appt ASAP.   Which we don't have any appts today or tomorrow.  Also c/o \"itching\" genital area.

## 2024-01-23 ENCOUNTER — TELEPHONE (OUTPATIENT)
Dept: INTERNAL MEDICINE CLINIC | Facility: CLINIC | Age: 39
End: 2024-01-23

## 2024-01-23 NOTE — TELEPHONE ENCOUNTER
Patient was given an appointment with me, but she did not show, please check on her, and make sure she follows with GYN

## 2024-01-25 NOTE — PROGRESS NOTES
OB return  Юлия Quintero is a 38 y.o. female   with 16w5d IUP with Estimated Date of Delivery: 24 presenting to the clinic as a routine OB.   Reports headaches  Problem list reviewed and updated    OB US report:  Images reviewed today. Report scanned into media  Indication: hx of LEEP  Findings: sIUP, CL 4.78, no funneling   Assessment: long and reassuring cervix    Pregnancy complicated by:  1. CHTN- not on bp meds, on baby ASA  2. Hx of liver hemangioma- see on imaging in , avoid estrogen, baseline LFT  3. Hx of LEEP in , after 2 , MFM referral, CL starting 16 weeks  4. AMA- 38 yr old   5. 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 GORDY, hx of depression, prn vistaril  7. Asthma- inhaler prn  8. Hx of hypothryoid- not on meds  9. Hx of THC- +UDS 2023    Physical Examination:  /60   Wt 73 kg (161 lb)   LMP 10/01/2023   BMI 29.45 kg/m²    Gen: AAOx3  Ab: soft, NTTP, gravid uterus  Skin: no edema    Plan:  --anatomy US scheduled  with MFM  --reglan sent along with benadryl for headache relief  --RTC 5 weeks

## 2024-01-26 ENCOUNTER — ROUTINE PRENATAL (OUTPATIENT)
Dept: OBGYN CLINIC | Age: 39
End: 2024-01-26
Payer: MEDICAID

## 2024-01-26 ENCOUNTER — PROCEDURE VISIT (OUTPATIENT)
Dept: OBGYN CLINIC | Age: 39
End: 2024-01-26
Payer: MEDICAID

## 2024-01-26 VITALS — BODY MASS INDEX: 29.45 KG/M2 | DIASTOLIC BLOOD PRESSURE: 60 MMHG | SYSTOLIC BLOOD PRESSURE: 100 MMHG | WEIGHT: 161 LBS

## 2024-01-26 DIAGNOSIS — O34.41 HISTORY OF LOOP ELECTROSURGICAL EXCISION PROCEDURE (LEEP) OF CERVIX AFFECTING PREGNANCY IN FIRST TRIMESTER: ICD-10-CM

## 2024-01-26 DIAGNOSIS — O99.320 MARIJUANA USE DURING PREGNANCY: ICD-10-CM

## 2024-01-26 DIAGNOSIS — O99.341 ANXIETY DURING PREGNANCY IN FIRST TRIMESTER, ANTEPARTUM: ICD-10-CM

## 2024-01-26 DIAGNOSIS — O98.319 GENITAL HERPES SIMPLEX VIRUS (HSV) INFECTION IN MOTHER AFFECTING PREGNANCY: ICD-10-CM

## 2024-01-26 DIAGNOSIS — F41.9 ANXIETY DURING PREGNANCY IN FIRST TRIMESTER, ANTEPARTUM: ICD-10-CM

## 2024-01-26 DIAGNOSIS — Z98.890 HISTORY OF LOOP ELECTROSURGICAL EXCISION PROCEDURE (LEEP) OF CERVIX AFFECTING PREGNANCY IN SECOND TRIMESTER: ICD-10-CM

## 2024-01-26 DIAGNOSIS — F12.90 MARIJUANA USE DURING PREGNANCY: ICD-10-CM

## 2024-01-26 DIAGNOSIS — O09.522 MULTIGRAVIDA OF ADVANCED MATERNAL AGE IN SECOND TRIMESTER: ICD-10-CM

## 2024-01-26 DIAGNOSIS — O09.521 HIGH RISK PREGNANCY, MULTIGRAVIDA OF ADVANCED MATERNAL AGE IN FIRST TRIMESTER: Primary | ICD-10-CM

## 2024-01-26 DIAGNOSIS — Z98.890 HISTORY OF LOOP ELECTROSURGICAL EXCISION PROCEDURE (LEEP) OF CERVIX AFFECTING PREGNANCY IN FIRST TRIMESTER: ICD-10-CM

## 2024-01-26 DIAGNOSIS — O34.42 HISTORY OF LOOP ELECTROSURGICAL EXCISION PROCEDURE (LEEP) OF CERVIX AFFECTING PREGNANCY IN SECOND TRIMESTER: ICD-10-CM

## 2024-01-26 DIAGNOSIS — O09.90 HIGH RISK PREGNANCY, ANTEPARTUM: Primary | ICD-10-CM

## 2024-01-26 DIAGNOSIS — O10.919 CHRONIC HYPERTENSION AFFECTING PREGNANCY: ICD-10-CM

## 2024-01-26 DIAGNOSIS — Z87.42 HISTORY OF POLYCYSTIC OVARIAN SYNDROME: ICD-10-CM

## 2024-01-26 DIAGNOSIS — A60.09 GENITAL HERPES SIMPLEX VIRUS (HSV) INFECTION IN MOTHER AFFECTING PREGNANCY: ICD-10-CM

## 2024-01-26 PROCEDURE — 99213 OFFICE O/P EST LOW 20 MIN: CPT | Performed by: OBSTETRICS & GYNECOLOGY

## 2024-01-26 PROCEDURE — 76817 TRANSVAGINAL US OBSTETRIC: CPT | Performed by: OBSTETRICS & GYNECOLOGY

## 2024-01-26 RX ORDER — METOCLOPRAMIDE 10 MG/1
10 TABLET ORAL PRN
Qty: 10 TABLET | Refills: 0 | Status: SHIPPED | OUTPATIENT
Start: 2024-01-26 | End: 2024-02-05

## 2024-02-05 ENCOUNTER — CARE COORDINATION (OUTPATIENT)
Dept: OTHER | Facility: CLINIC | Age: 39
End: 2024-02-05

## 2024-02-05 NOTE — CARE COORDINATION
Call within 2 business days of discharge: Yes     Patient Current Location: [place]    Last Discharge Facility       Date Complaint Diagnosis Description Type Department Provider    23 Shortness of Breath; Otalgia Acute non-recurrent maxillary sinusitis ... ED (DISCHARGE) [facility]      Maternity Care Manager contacted the patient by telephone to discuss the maternity management program.  Patient agrees to care management services at this time. Verified name and  with patient as identifiers.     Risk Factors Identified:  anxiety/hypothyroidism      Needs to be reviewed by the provider   Meds/mh concerns         Method of communication with provider : face to face    Advance Care Planning:   Does patient have an Advance Directive:  not on file.     Does patient have OB/Gyn Selected? Yes    Discussed follow up appointments. If no appointment was previously scheduled, appointment scheduling offered: Yes  [facility] follow up appointment(s):   Future Appointments   Date Time Provider Department Center   2024  9:00 AM [facility] ULTRASOUND 1 [facility] G[facility] AMB   2024 10:00 AM Hardik Steinberg MD [facility] AMB   2024 10:15 AM Ivania Jones MD [facility] G[facility] AMB   2024  3:00 PM Precious Rowland [facility] G[facility] AMB   2024  2:15 PM Ivania Jones MD [facility] G[facility] AMB     Non-[facility] follow up appointment(s): n/a    OB History:   OB History    Para Term  AB Living   4 2 2   1 2   SAB IAB Ectopic Molar Multiple Live Births   1 0       2      # Outcome Date GA Lbr David/2nd Weight Sex Delivery Anes PTL Lv   4 Current            3 SAB 2023           2 Term 08 39w0d  3.572 kg (7 lb 14 oz) F Vag-Spont None N SVITLANA      Birth Comments: Odogwu   1 Term 05/15/06 39w0d  4.054 kg (8 lb 15 oz) M Vag-Spont EPI N SVITLANA      Birth Comments: Kenrick      Obstetric Comments   OB hx:   G1  term  per patient (Dr Carlton) --no records in care everywhere dating back this far.    G2  term  per patient (

## 2024-02-06 NOTE — CARE COORDINATION
Call within 2 business days of discharge: Yes     Patient Current Location: [place]    Last Discharge Facility       Date Complaint Diagnosis Description Type Department Provider    23 Shortness of Breath; Otalgia Acute non-recurrent maxillary sinusitis ... ED (DISCHARGE) [facility]             Maternity Care Manager contacted the patient by telephone to discuss the maternity management program.  Patient agrees to care management services at this time. Verified name and  with patient as identifiers.     Risk Factors Identified: Depression/Mental Health       Needs to be reviewed by the provider   Anxiety/lack of sleep during this pregnancy         Method of communication with provider :  pt getting appointment this week to discuss concerns with ob    Advance Care Planning:   Does patient have an Advance Directive:  not on file.     Does patient have OB/Gyn Selected? Yes    Discussed follow up appointments. If no appointment was previously scheduled, appointment scheduling offered: Yes  [facility] follow up appointment(s):   Future Appointments   Date Time Provider Department Center   2024 11:00 AM Ivania Jones MD [facility] GVL AMB   2024  9:00 AM [facility] ULTRASOUND 1 [facility] AMB   2024 10:00 AM Hardik Steinberg MD [facility] G[facility] AMB   2024 10:15 AM Ivania Jones MD [facility] GVL AMB   2024  3:00 PM Precious Rowland [facility] GVL AMB   2024  2:15 PM Ivania Jones MD [facility] GVL AMB     Non-[facility] follow up appointment(s):     OB History:   OB History    Para Term  AB Living   4 2 2   1 2   SAB IAB Ectopic Molar Multiple Live Births   1 0       2      # Outcome Date GA Lbr David/2nd Weight Sex Delivery Anes PTL Lv   4 Current            3 SAB 2023           2 Term 08 39w0d  3.572 kg (7 lb 14 oz) F Vag-Spont None N SVITLANA      Birth Comments: Odogwu   1 Term 05/15/06 39w0d  4.054 kg (8 lb 15 oz) M Vag-Spont EPI N SVITLANA      Birth Comments: Choudhary      Obstetric Comments   OB hx:

## 2024-02-07 ENCOUNTER — FOLLOWUP TELEPHONE ENCOUNTER (OUTPATIENT)
Dept: CASE MANAGEMENT | Age: 39
End: 2024-02-07

## 2024-02-07 ENCOUNTER — OFFICE VISIT (OUTPATIENT)
Dept: OBGYN CLINIC | Age: 39
End: 2024-02-07

## 2024-02-07 VITALS
WEIGHT: 161 LBS | HEIGHT: 62 IN | SYSTOLIC BLOOD PRESSURE: 110 MMHG | BODY MASS INDEX: 29.63 KG/M2 | DIASTOLIC BLOOD PRESSURE: 64 MMHG

## 2024-02-07 DIAGNOSIS — F32.A DEPRESSION WITH SUICIDAL IDEATION: Primary | ICD-10-CM

## 2024-02-07 DIAGNOSIS — O99.320 MARIJUANA USE DURING PREGNANCY: ICD-10-CM

## 2024-02-07 DIAGNOSIS — D18.03 LIVER HEMANGIOMA: ICD-10-CM

## 2024-02-07 DIAGNOSIS — F41.1 GAD (GENERALIZED ANXIETY DISORDER): ICD-10-CM

## 2024-02-07 DIAGNOSIS — R45.851 DEPRESSION WITH SUICIDAL IDEATION: Primary | ICD-10-CM

## 2024-02-07 DIAGNOSIS — F33.8 SEASONAL AFFECTIVE DISORDER (HCC): ICD-10-CM

## 2024-02-07 DIAGNOSIS — O09.90 HIGH RISK PREGNANCY, ANTEPARTUM: ICD-10-CM

## 2024-02-07 DIAGNOSIS — F12.90 MARIJUANA USE DURING PREGNANCY: ICD-10-CM

## 2024-02-07 DIAGNOSIS — O09.522 MULTIGRAVIDA OF ADVANCED MATERNAL AGE IN SECOND TRIMESTER: ICD-10-CM

## 2024-02-07 DIAGNOSIS — Z87.59 HISTORY OF POSTPARTUM DEPRESSION: ICD-10-CM

## 2024-02-07 DIAGNOSIS — Z86.59 HISTORY OF POSTPARTUM DEPRESSION: ICD-10-CM

## 2024-02-07 DIAGNOSIS — Z72.89 DELIBERATE SELF-CUTTING: ICD-10-CM

## 2024-02-07 DIAGNOSIS — I10 CHRONIC HYPERTENSION: ICD-10-CM

## 2024-02-07 RX ORDER — DIPHENHYDRAMINE HCL 25 MG
25 TABLET ORAL EVERY 6 HOURS PRN
COMMUNITY

## 2024-02-07 RX ORDER — FLUOXETINE HYDROCHLORIDE 20 MG/1
20 CAPSULE ORAL DAILY
Qty: 90 CAPSULE | Refills: 3 | Status: SHIPPED | OUTPATIENT
Start: 2024-02-07

## 2024-02-07 NOTE — PROGRESS NOTES
CC:   Work in  problem visit for c/o anxiety/depression, suicidal ideations     HPI:    Юлия  is a 38 y.o. , , Patient's last menstrual period was 10/01/2023.,  who is seen for c/o anxiety/depression, suicidal ideations.  She is currently 18w3d.  Denies JACQUE MCDERMOTT.     Anatomy US scheduled  with M     States her PCP and therapist in [facility] wanted her to talk w/ me about this today .     SW  has called pt severeal times; pt states she hasn't had a chance to call her back.  She spoke with Gisella Luna on Monday.  She was given # for [facility] but states she \"hasn't signed up for anything yet\"      Denies any current THC use or any other drugs.  +UDS for THC at preg intake (23)          GORDY,  Depression; hx panic attacks  No longer taking Lexapro--states she was actually was only taking \"PRN\" in the past.  Reports has tried another medication of unknown name but \"no help\"  Hx PP Depression w/ G1  At previous encounters she reported she was not interested in taking a medication on a daily basis; declined SSRI  Was interested in Vistaril -- Rx given 23 [facility]: Taking vistaril for anxiety attacks but reports that they are really not working and it makes her sleepy.  Will get Jennifer to reach out to pt to help her get a new therapist that takes her insurance.      24:  Has a therapist she currently sees virtually in [facility].  States her therapist told her she needs to see someone in person that is closer.    Today states she always has depression but \"I just dont' say nothing to you all.\"  Her 2 kids live with her (15 and 17 years old)  States they are worried about her.  They comment about her staying in bed all the time.  Tell her she needs to get out of the house.  Patient reports it is common for her to stay in bed all day.    States today is the first time she has been out of her house and 4 days.  Lack of motivation  Feels

## 2024-02-07 NOTE — TELEPHONE ENCOUNTER
Voicemail received from patient yesterday afternoon.    Call returned to patient at [phone].  No answer; message left requesting call back.    DAISY Vela, [facility]-C  [facility]   [phone]

## 2024-02-07 NOTE — TELEPHONE ENCOUNTER
Phone call received from patient.  Patient states that her \"depression has gotten bad.\"  She shared that she is experiences suicidal thoughts, but she does not have a plan.   confirmed that patient does not have a plan at this time.    Patient has an appointment with [facility] on Friday for an intake assessment.  Discussed importance of patient proactively addressing her mental health concerns for both her wellbeing and the wellbeing of her family.    SW encouraged patient to reach out at any point.  Patient agreeable for  to call back next week.    Jennifer Lowery, ZAYNAB-ANA, [facility]-C  [facility]   [phone]

## 2024-02-09 ENCOUNTER — CARE COORDINATION (OUTPATIENT)
Dept: OTHER | Facility: CLINIC | Age: 39
End: 2024-02-09

## 2024-02-09 NOTE — CARE COORDINATION
records in care everywhere dating back this far   G3 2023 EAB w/ D&C (pt states she did not have an EAB, removed from pregnancy record)   G4 2023 MAB w/ D&C        x 2 -- pt reports \" no issues\" with either pregnancies.   Denies necessitating IOL with either pregnancies   Denies any hx GDM, GHTN/PreE           18w5d    Medication reconciliation was performed with patient, who verbalizes understanding of administration of home medications. Advised obtaining a 90-day supply of all daily and as-needed medications.     2nd and 3rd Trimester Focused Assessment: 18w  S/w pt today and she reported that she had her appointment with the ob  and they got her connected with the [employer] therapy and she will be starting this on Monday. Pt remains out of work at this time. We discussed the bwwb and I sent her the enrollment information via [employer] message. Pt is also interested in breast feeding and will need a breast pump. Pt sounded hopeful of the upcoming therapy. Will continue to follow.    Healthy behavior review  Fetal movement-yes  Red flags: bleeding/leaking  Labor signs and symptoms-none      Patient given an opportunity to ask questions and does not have any further questions or concerns at this time. Reviewed appropriate site of care based on symptoms and resources available to patient including: When to call 911  Condition related references. The patient agrees to contact the OB/Gyn office for questions related to their healthcare.    Plan for follow-up call in 5-7 days based on severity of symptoms and risk factors.  Plan for next call: self management-anxiety during pregnancy f/u

## 2024-02-11 ENCOUNTER — HOSPITAL ENCOUNTER (OUTPATIENT)
Age: 39
Discharge: HOME | End: 2024-02-11
Attending: OBSTETRICS & GYNECOLOGY | Admitting: OBSTETRICS & GYNECOLOGY
Payer: MEDICAID

## 2024-02-11 VITALS
RESPIRATION RATE: 16 BRPM | OXYGEN SATURATION: 100 % | TEMPERATURE: 98.1 F | HEART RATE: 75 BPM | SYSTOLIC BLOOD PRESSURE: 113 MMHG | DIASTOLIC BLOOD PRESSURE: 70 MMHG

## 2024-02-11 PROBLEM — F32.A DEPRESSION WITH SUICIDAL IDEATION: Status: ACTIVE | Noted: 2024-02-07

## 2024-02-11 PROBLEM — F33.8 SEASONAL AFFECTIVE DISORDER (HCC): Status: ACTIVE | Noted: 2024-02-11

## 2024-02-11 PROBLEM — R55 VASOVAGAL ATTACK: Status: ACTIVE | Noted: 2024-02-11

## 2024-02-11 PROBLEM — F32.A DEPRESSION WITH SUICIDAL IDEATION: Status: ACTIVE | Noted: 2024-02-11

## 2024-02-11 PROBLEM — R45.851 DEPRESSION WITH SUICIDAL IDEATION: Status: ACTIVE | Noted: 2024-02-07

## 2024-02-11 PROBLEM — R45.851 DEPRESSION WITH SUICIDAL IDEATION: Status: ACTIVE | Noted: 2024-02-11

## 2024-02-11 PROBLEM — Z72.89 DELIBERATE SELF-CUTTING: Status: ACTIVE | Noted: 2024-02-11

## 2024-02-11 LAB
ALBUMIN SERPL-MCNC: 3 G/DL (ref 3.5–5)
ALBUMIN/GLOB SERPL: 0.8 (ref 0.4–1.6)
ALP SERPL-CCNC: 58 U/L (ref 50–136)
ALT SERPL-CCNC: 27 U/L (ref 12–65)
ANION GAP SERPL CALC-SCNC: 5 MMOL/L (ref 2–11)
AST SERPL-CCNC: 15 U/L (ref 15–37)
BASOPHILS # BLD: 0 K/UL (ref 0–0.2)
BASOPHILS NFR BLD: 1 % (ref 0–2)
BILIRUB SERPL-MCNC: 0.4 MG/DL (ref 0.2–1.1)
BUN SERPL-MCNC: 6 MG/DL (ref 6–23)
CALCIUM SERPL-MCNC: 9.3 MG/DL (ref 8.3–10.4)
CHLORIDE SERPL-SCNC: 106 MMOL/L (ref 103–113)
CO2 SERPL-SCNC: 26 MMOL/L (ref 21–32)
CREAT SERPL-MCNC: 0.63 MG/DL (ref 0.6–1)
DIFFERENTIAL METHOD BLD: ABNORMAL
EOSINOPHIL # BLD: 0.1 K/UL (ref 0–0.8)
EOSINOPHIL NFR BLD: 1 % (ref 0.5–7.8)
ERYTHROCYTE [DISTWIDTH] IN BLOOD BY AUTOMATED COUNT: 14.7 % (ref 11.9–14.6)
GLOBULIN SER CALC-MCNC: 4 G/DL (ref 2.8–4.5)
GLUCOSE SERPL-MCNC: 119 MG/DL (ref 65–100)
HCT VFR BLD AUTO: 37.4 % (ref 35.8–46.3)
HGB BLD-MCNC: 12.2 G/DL (ref 11.7–15.4)
IMM GRANULOCYTES # BLD AUTO: 0 K/UL (ref 0–0.5)
IMM GRANULOCYTES NFR BLD AUTO: 1 % (ref 0–5)
LYMPHOCYTES # BLD: 1.1 K/UL (ref 0.5–4.6)
LYMPHOCYTES NFR BLD: 14 % (ref 13–44)
MCH RBC QN AUTO: 28.6 PG (ref 26.1–32.9)
MCHC RBC AUTO-ENTMCNC: 32.6 G/DL (ref 31.4–35)
MCV RBC AUTO: 87.6 FL (ref 82–102)
MONOCYTES # BLD: 0.5 K/UL (ref 0.1–1.3)
MONOCYTES NFR BLD: 6 % (ref 4–12)
NEUTS SEG # BLD: 6.2 K/UL (ref 1.7–8.2)
NEUTS SEG NFR BLD: 78 % (ref 43–78)
NRBC # BLD: 0 K/UL (ref 0–0.2)
PLATELET # BLD AUTO: 323 K/UL (ref 150–450)
PMV BLD AUTO: 12.6 FL (ref 9.4–12.3)
POTASSIUM SERPL-SCNC: 3.6 MMOL/L (ref 3.5–5.1)
PROT SERPL-MCNC: 7 G/DL (ref 6.3–8.2)
RBC # BLD AUTO: 4.27 M/UL (ref 4.05–5.2)
SODIUM SERPL-SCNC: 137 MMOL/L (ref 136–146)
TSH W FREE THYROID IF ABNORMAL: 0.42 UIU/ML (ref 0.36–3.74)
WBC # BLD AUTO: 7.9 K/UL (ref 4.3–11.1)

## 2024-02-11 PROCEDURE — 80053 COMPREHEN METABOLIC PANEL: CPT

## 2024-02-11 PROCEDURE — 85025 COMPLETE CBC W/AUTO DIFF WBC: CPT

## 2024-02-11 PROCEDURE — 99284 EMERGENCY DEPT VISIT MOD MDM: CPT

## 2024-02-11 PROCEDURE — 6370000000 HC RX 637 (ALT 250 FOR IP): Performed by: OBSTETRICS & GYNECOLOGY

## 2024-02-11 PROCEDURE — 84443 ASSAY THYROID STIM HORMONE: CPT

## 2024-02-11 RX ORDER — BUTALBITAL, ACETAMINOPHEN AND CAFFEINE 50; 325; 40 MG/1; MG/1; MG/1
1 TABLET ORAL EVERY 4 HOURS PRN
Status: DISCONTINUED | OUTPATIENT
Start: 2024-02-11 | End: 2024-02-11 | Stop reason: HOSPADM

## 2024-02-11 RX ORDER — ACETAMINOPHEN 500 MG
1000 TABLET ORAL ONCE
Status: DISCONTINUED | OUTPATIENT
Start: 2024-02-11 | End: 2024-02-11

## 2024-02-11 RX ADMIN — BUTALBITAL, ACETAMINOPHEN, AND CAFFEINE 1 TABLET: 50; 325; 40 TABLET ORAL at 20:42

## 2024-02-11 NOTE — PROGRESS NOTES
Pt arrived to [facility] with c/o dizziness and nausea at home. States she backed up to the wall and slid down to keep from falling. Still c/o dizziness and nausea. Dr. Harris to come assess pt.

## 2024-02-12 ENCOUNTER — CARE COORDINATION (OUTPATIENT)
Dept: OTHER | Facility: CLINIC | Age: 39
End: 2024-02-12

## 2024-02-12 ENCOUNTER — FOLLOWUP TELEPHONE ENCOUNTER (OUTPATIENT)
Dept: CASE MANAGEMENT | Age: 39
End: 2024-02-12

## 2024-02-12 NOTE — ED TRIAGE NOTES
Allergies   Allergen Reactions    Fluconazole Hives    Propranolol Rash     Prior to Admission medications    Medication Sig Start Date End Date Taking? Authorizing Provider   diphenhydrAMINE (BENADRYL) 25 MG tablet Take 1 tablet by mouth every 6 hours as needed for Itching  Patient not taking: Reported on 2024    ProviderBisi MD   FLUoxetine (PROZAC) 20 MG capsule Take 1 capsule by mouth daily 24   Ivania Jones MD   metoclopramide (REGLAN) 10 MG tablet Take 1 tablet by mouth as needed (take with benadryl) 24  Carri Vilchis MD   ondansetron (ZOFRAN-ODT) 4 MG disintegrating tablet Place 2 tablets under the tongue every 8 hours as needed for Nausea or Vomiting  Patient not taking: Reported on 23   Elsa Jarvis MD   Cholecalciferol (VITAMIN D) 50 MCG (2000 UT) CAPS capsule Take by mouth    ProviderBisi MD   hydrOXYzine pamoate (VISTARIL) 25 MG capsule Take 1-2 tabs (25-50mg) every 6 hours as needed for anxiety 23   Ivania Jones MD   NONFORMULARY     ProviderBisi MD   valACYclovir (VALTREX) 1 g tablet Take 1 tablet by mouth daily  Patient not taking: Reported on 2024   Celeste Barron MD   albuterol sulfate  (90 Base) MCG/ACT inhaler Inhale 2 puffs into the lungs every 6 hours as needed  Patient not taking: Reported on 2024   Automatic Reconciliation, Ar        Review of Systems:  Complete review of systems performed.  Those not specifically mentioned in the HPI are either negative are non related to this patient encounter.    Objective:     Vitals:    Vitals:    24 1836   BP: 113/70   Pulse: 75   Resp: 16   Temp: 98.1 °F (36.7 °C)   TempSrc: Oral   SpO2: 100%      Temp (24hrs), Av.1 °F (36.7 °C), Min:98.1 °F (36.7 °C), Max:98.1 °F (36.7 °C)    BP  Min: 113/70  Max: 113/70       Physical Exam:  Heart: RRR  Lungs: cta bl  Adb: soft, nt nd, gravid  Ext: no edema

## 2024-02-12 NOTE — PROGRESS NOTES
Patient given discharge paperwork at this time. Instructed to follow up with regular scheduled OB visit. PO hydration of water and Gatorade encouraged. Patient has no questions at this time.

## 2024-02-12 NOTE — TELEPHONE ENCOUNTER
Phone call to patient at [phone] to check-in.     No answer; message left requesting call back.     ZAYNAB Vela-ANA, [facility]-C  [facility]   [phone]

## 2024-02-14 ENCOUNTER — ROUTINE PRENATAL (OUTPATIENT)
Dept: OBGYN CLINIC | Age: 39
End: 2024-02-14
Payer: MEDICAID

## 2024-02-14 ENCOUNTER — FOLLOWUP TELEPHONE ENCOUNTER (OUTPATIENT)
Dept: CASE MANAGEMENT | Age: 39
End: 2024-02-14

## 2024-02-14 VITALS — BODY MASS INDEX: 29.26 KG/M2 | SYSTOLIC BLOOD PRESSURE: 100 MMHG | DIASTOLIC BLOOD PRESSURE: 60 MMHG | WEIGHT: 160 LBS

## 2024-02-14 DIAGNOSIS — F12.90 MARIJUANA USE DURING PREGNANCY: ICD-10-CM

## 2024-02-14 DIAGNOSIS — O09.90 HIGH RISK PREGNANCY, ANTEPARTUM: Primary | ICD-10-CM

## 2024-02-14 DIAGNOSIS — O99.320 MARIJUANA USE DURING PREGNANCY: ICD-10-CM

## 2024-02-14 DIAGNOSIS — D18.03 LIVER HEMANGIOMA: ICD-10-CM

## 2024-02-14 DIAGNOSIS — R42 DIZZINESS: ICD-10-CM

## 2024-02-14 DIAGNOSIS — R55 SYNCOPE, UNSPECIFIED SYNCOPE TYPE: ICD-10-CM

## 2024-02-14 DIAGNOSIS — Z86.59 HISTORY OF POSTPARTUM DEPRESSION: ICD-10-CM

## 2024-02-14 DIAGNOSIS — Z86.39 HISTORY OF HYPOTHYROIDISM: ICD-10-CM

## 2024-02-14 DIAGNOSIS — F99 MENTAL DISORDER: ICD-10-CM

## 2024-02-14 DIAGNOSIS — Z98.890 HISTORY OF LOOP ELECTROSURGICAL EXCISION PROCEDURE (LEEP) OF CERVIX AFFECTING PREGNANCY IN FIRST TRIMESTER: ICD-10-CM

## 2024-02-14 DIAGNOSIS — F41.1 GAD (GENERALIZED ANXIETY DISORDER): ICD-10-CM

## 2024-02-14 DIAGNOSIS — O09.521 HIGH RISK PREGNANCY, MULTIGRAVIDA OF ADVANCED MATERNAL AGE IN FIRST TRIMESTER: ICD-10-CM

## 2024-02-14 DIAGNOSIS — O09.521 MULTIGRAVIDA OF ADVANCED MATERNAL AGE IN FIRST TRIMESTER: ICD-10-CM

## 2024-02-14 DIAGNOSIS — Z87.42 HISTORY OF POLYCYSTIC OVARIAN SYNDROME: ICD-10-CM

## 2024-02-14 DIAGNOSIS — R45.851 DEPRESSION WITH SUICIDAL IDEATION: ICD-10-CM

## 2024-02-14 DIAGNOSIS — F32.A DEPRESSION WITH SUICIDAL IDEATION: ICD-10-CM

## 2024-02-14 DIAGNOSIS — A60.09 GENITAL HERPES SIMPLEX VIRUS (HSV) INFECTION IN MOTHER AFFECTING PREGNANCY: ICD-10-CM

## 2024-02-14 DIAGNOSIS — R55 VASOVAGAL ATTACK: ICD-10-CM

## 2024-02-14 DIAGNOSIS — O10.919 CHRONIC HYPERTENSION AFFECTING PREGNANCY: ICD-10-CM

## 2024-02-14 DIAGNOSIS — O98.319 GENITAL HERPES SIMPLEX VIRUS (HSV) INFECTION IN MOTHER AFFECTING PREGNANCY: ICD-10-CM

## 2024-02-14 DIAGNOSIS — Z87.59 HISTORY OF POSTPARTUM DEPRESSION: ICD-10-CM

## 2024-02-14 DIAGNOSIS — F33.8 SEASONAL AFFECTIVE DISORDER (HCC): ICD-10-CM

## 2024-02-14 DIAGNOSIS — O34.41 HISTORY OF LOOP ELECTROSURGICAL EXCISION PROCEDURE (LEEP) OF CERVIX AFFECTING PREGNANCY IN FIRST TRIMESTER: ICD-10-CM

## 2024-02-14 LAB
AMPHET UR QL SCN: NEGATIVE
BARBITURATES UR QL SCN: POSITIVE
BENZODIAZ UR QL: NEGATIVE
CANNABINOIDS UR QL SCN: NEGATIVE
COCAINE UR QL SCN: NEGATIVE
METHADONE UR QL: NEGATIVE
OPIATES UR QL: NEGATIVE
PCP UR QL: NEGATIVE

## 2024-02-14 PROCEDURE — 99214 OFFICE O/P EST MOD 30 MIN: CPT | Performed by: OBSTETRICS & GYNECOLOGY

## 2024-02-14 NOTE — PROGRESS NOTES
LAUREL  19w3d  Denies LOF, VB, Ctxs.  Good FM.      BERTHA US w/ MFM      Pt seen by ob hospitalist 24 for c/o near vasovagal episode.  Reports dizziness w/ standing several times.  No SOB/CP, palpitations, orthopnea, no LE swelling.  CBC, CMP, TSH all  wnl 24. EKG reportedly wnl as well-- the final read is not yet released.  States she was told she needs to see a Cardiologist.  Pt counseled extensively on the CV changes in pregnancy and the progesterone effect on smooth muscle in the body that causes this.  Discussed staying hydrated and changing positions slowly.  Pt persistent in her request for a cardiology referral for her peace of mind, as as she states another MD recommended this.  Cards referral placed            Genetics: Low Risk NIPT, male; declines Carrier Screening   Normal NT w/ MFM        OB hx:  G1  term  per patient (Dr Carlton) --no records in care everywhere dating back this far.  (8#15)   G2  term  per patient (Dr Carlton) --no records in care everywhere dating back this far (7#15#)   G3 2023 EAB w/ D&C  G4 2023 MAB w/ D&C       x 2 -- pt reports \" no issues\" with either pregnancies.  Denies necessitating IOL with either pregnancies  Denies any hx GDM, GHTN/PreE            CHTN:  Diagnosed w/ Dr West approx 3yrs ago   Was on bps meds in the past-- none in the past 1yr   Baseline HELLP labs wnl other than mildly elevated ALT at 75; repeat CMP wnl (known liver hemangioma)   Baseline PC ratio 0.1  ASA 162mg     2/15/24:  bp 100/60      Depression w/ periodic SI, hx Cutting, GORDY, hx PP depression, Seasonal affective DO:   See note from 24 for complete details   Telehealth therapist in [facility] --was told to see a psychiatrist in person   +UDS for THC at preg intake (23) --reports no longer taking  Was only taking Lexapro \"PRN\" in the past.  tried another medication of unknown name but \"no help\"  Hx PP Depression w/ G1  Prior visits was not interested

## 2024-02-14 NOTE — TELEPHONE ENCOUNTER
Phone call to patient at [phone] to check-in.     No answer; message left requesting call back.     ZAYNAB Vela-ANA, [facility]-C  [facility]   [phone]   n/a

## 2024-02-15 NOTE — RESULT ENCOUNTER NOTE
+Barbiturates; I don't have a documented medication that you are on that should give this result.      Please enquire as to whether the pt started something new-- if so what is it, the dose, and the prescribing physician's name

## 2024-02-16 ENCOUNTER — CARE COORDINATION (OUTPATIENT)
Dept: OTHER | Facility: CLINIC | Age: 39
End: 2024-02-16

## 2024-02-16 NOTE — CARE COORDINATION
Received a voice message from this pt that she was calling me back. Called the pt back but there was no answer and I could not leave a voice message as her answering machine was full. Will continue to follow.

## 2024-02-16 NOTE — CARE COORDINATION
Call within 2 business days of discharge: Yes     Patient Current Location: [place]    Last Discharge Facility       Date Complaint Diagnosis Description Type Department Provider    24   Admission (Discharged) Ifeanyi Walsh MD       Maternity Care Manager contacted the patient by telephone to discuss the maternity management program.  Patient agrees to care management services at this time. Verified name and  with patient as identifiers.     Risk Factors Identified: Depression/Mental Health   ama    Needs to be reviewed by the provider   none         Method of communication with provider : none    Advance Care Planning:   Does patient have an Advance Directive:  not on file.     Does patient have OB/Gyn Selected? Yes    Discussed follow up appointments. If no appointment was previously scheduled, appointment scheduling offered: Yes  [facility] follow up appointment(s):   Future Appointments   Date Time Provider Department Center   2024  9:00 AM [facility] ULTRASOUND 1 [facility] GVL AMB   2024 10:00 AM Hardik Steinberg MD [facility] GVL AMB   3/8/2024 11:15 AM Carri Vilchis MD [facility] 1 GVL AMB   2024  3:00 PM Precious Rowland [facility] GVL AMB   2024  2:15 PM Ivania Jones MD [facility] GVL AMB     Non-[facility] follow up appointment(s): [facility] daily outpatient group sessions    OB History:   OB History    Para Term  AB Living   4 2 2   1 2   SAB IAB Ectopic Molar Multiple Live Births   1 0       2      # Outcome Date GA Lbr David/2nd Weight Sex Delivery Anes PTL Lv   4 Current            3 SAB 2023           2 Term 08 39w0d  3.572 kg (7 lb 14 oz) F Vag-Spont None N [facility]NA      Birth Comments: Odogwu   1 Term 05/15/06 39w0d  4.054 kg (8 lb 15 oz) M Vag-Spont EPI N [facility]NA      Birth Comments: Kenrick      Obstetric Comments   OB hx:   G1  term  per patient (Dr Carlton) --no records in care everywhere dating back this far.    G2  term  per patient (

## 2024-02-21 ENCOUNTER — CARE COORDINATION (OUTPATIENT)
Dept: OTHER | Facility: CLINIC | Age: 39
End: 2024-02-21

## 2024-02-21 ENCOUNTER — FOLLOWUP TELEPHONE ENCOUNTER (OUTPATIENT)
Dept: CASE MANAGEMENT | Age: 39
End: 2024-02-21

## 2024-02-21 NOTE — CARE COORDINATION
records in care everywhere dating back this far   G3 2023 EAB w/ D&C (pt states she did not have an EAB, removed from pregnancy record)   G4 2023 MAB w/ D&C        x 2 -- pt reports \" no issues\" with either pregnancies.   Denies necessitating IOL with either pregnancies   Denies any hx GDM, GHTN/PreE           20w3d    Barriers/Support system:  mother  Return to work planning? Yes      2nd and 3rd Trimester Focused Assessment: 20w  S/w the pt today she had some questions about the breast pump ordering and the BWWB jenn. SW also following with pt trying to find a psychiatrist for when the outpt therapy is completed. Pt responding well to therapy thus far. Will continue to follow          Patient given an opportunity to ask questions and does not have any further questions or concerns at this time. Were discharge instructions available to patient? Yes. Reviewed appropriate site of care based on symptoms and resources available to patient including: Benefits related nurse triage line  When to call 911  Condition related references. The patient agrees to contact the OB/Gyn office for questions related to their healthcare.    Plan for follow-up call in 10-14 days based on severity of symptoms and risk factors.  Plan for next call: self management-prenatal care high risk

## 2024-02-21 NOTE — TELEPHONE ENCOUNTER
Phone call to patient at [phone].    No answer; message left requesting call back.    ZAYNAB Vela-ANA, [facility]-C  [facility]   [phone]

## 2024-02-22 PROBLEM — O34.42 HISTORY OF LOOP ELECTROSURGICAL EXCISION PROCEDURE (LEEP) OF CERVIX AFFECTING PREGNANCY IN SECOND TRIMESTER: Status: ACTIVE | Noted: 2019-02-15

## 2024-02-22 PROBLEM — O09.522 HIGH RISK PREGNANCY, MULTIGRAVIDA OF ADVANCED MATERNAL AGE IN SECOND TRIMESTER: Status: ACTIVE | Noted: 2023-12-04

## 2024-02-23 ENCOUNTER — ROUTINE PRENATAL (OUTPATIENT)
Dept: OBGYN CLINIC | Age: 39
End: 2024-02-23

## 2024-02-23 VITALS — DIASTOLIC BLOOD PRESSURE: 79 MMHG | SYSTOLIC BLOOD PRESSURE: 114 MMHG

## 2024-02-23 DIAGNOSIS — O99.342 ANXIETY DURING PREGNANCY, ANTEPARTUM, SECOND TRIMESTER: ICD-10-CM

## 2024-02-23 DIAGNOSIS — Z80.3 FAMILY HISTORY OF BREAST CANCER: ICD-10-CM

## 2024-02-23 DIAGNOSIS — Z3A.20 20 WEEKS GESTATION OF PREGNANCY: ICD-10-CM

## 2024-02-23 DIAGNOSIS — Z87.42 HISTORY OF POLYCYSTIC OVARIAN SYNDROME: ICD-10-CM

## 2024-02-23 DIAGNOSIS — Z98.890 HISTORY OF LOOP ELECTROSURGICAL EXCISION PROCEDURE (LEEP) OF CERVIX AFFECTING PREGNANCY IN SECOND TRIMESTER: ICD-10-CM

## 2024-02-23 DIAGNOSIS — A60.09 GENITAL HERPES SIMPLEX VIRUS (HSV) INFECTION IN MOTHER AFFECTING PREGNANCY: Primary | ICD-10-CM

## 2024-02-23 DIAGNOSIS — R45.851 DEPRESSION WITH SUICIDAL IDEATION: ICD-10-CM

## 2024-02-23 DIAGNOSIS — O98.319 GENITAL HERPES SIMPLEX VIRUS (HSV) INFECTION IN MOTHER AFFECTING PREGNANCY: Primary | ICD-10-CM

## 2024-02-23 DIAGNOSIS — R42 DIZZINESS: ICD-10-CM

## 2024-02-23 DIAGNOSIS — Z86.39 HISTORY OF HYPOTHYROIDISM: ICD-10-CM

## 2024-02-23 DIAGNOSIS — F12.90 MARIJUANA USE DURING PREGNANCY: ICD-10-CM

## 2024-02-23 DIAGNOSIS — O09.522 HIGH RISK PREGNANCY, MULTIGRAVIDA OF ADVANCED MATERNAL AGE IN SECOND TRIMESTER: ICD-10-CM

## 2024-02-23 DIAGNOSIS — F32.A DEPRESSION WITH SUICIDAL IDEATION: ICD-10-CM

## 2024-02-23 DIAGNOSIS — O10.919 CHRONIC HYPERTENSION AFFECTING PREGNANCY: ICD-10-CM

## 2024-02-23 DIAGNOSIS — O34.42 HISTORY OF LOOP ELECTROSURGICAL EXCISION PROCEDURE (LEEP) OF CERVIX AFFECTING PREGNANCY IN SECOND TRIMESTER: ICD-10-CM

## 2024-02-23 DIAGNOSIS — O99.320 MARIJUANA USE DURING PREGNANCY: ICD-10-CM

## 2024-02-23 DIAGNOSIS — F41.9 ANXIETY DURING PREGNANCY, ANTEPARTUM, SECOND TRIMESTER: ICD-10-CM

## 2024-02-23 PROBLEM — F33.8 SEASONAL AFFECTIVE DISORDER (HCC): Status: RESOLVED | Noted: 2024-02-11 | Resolved: 2024-02-23

## 2024-02-23 PROBLEM — L29.9 PRURITIC DISORDER: Status: RESOLVED | Noted: 2023-12-29 | Resolved: 2024-02-23

## 2024-02-23 PROBLEM — R55 VASOVAGAL ATTACK: Status: RESOLVED | Noted: 2024-02-11 | Resolved: 2024-02-23

## 2024-02-23 PROBLEM — D18.03 LIVER HEMANGIOMA: Status: RESOLVED | Noted: 2022-11-01 | Resolved: 2024-02-23

## 2024-02-23 ASSESSMENT — COLUMBIA-SUICIDE SEVERITY RATING SCALE - C-SSRS
2. HAVE YOU ACTUALLY HAD ANY THOUGHTS OF KILLING YOURSELF?: NO
6. HAVE YOU EVER DONE ANYTHING, STARTED TO DO ANYTHING, OR PREPARED TO DO ANYTHING TO END YOUR LIFE?: NO
1. WITHIN THE PAST MONTH, HAVE YOU WISHED YOU WERE DEAD OR WISHED YOU COULD GO TO SLEEP AND NOT WAKE UP?: NO

## 2024-02-23 ASSESSMENT — PATIENT HEALTH QUESTIONNAIRE - PHQ9
9. THOUGHTS THAT YOU WOULD BE BETTER OFF DEAD, OR OF HURTING YOURSELF: 0
3. TROUBLE FALLING OR STAYING ASLEEP: 3
SUM OF ALL RESPONSES TO PHQ QUESTIONS 1-9: 20
8. MOVING OR SPEAKING SO SLOWLY THAT OTHER PEOPLE COULD HAVE NOTICED. OR THE OPPOSITE, BEING SO FIGETY OR RESTLESS THAT YOU HAVE BEEN MOVING AROUND A LOT MORE THAN USUAL: 2
1. LITTLE INTEREST OR PLEASURE IN DOING THINGS: 3
SUM OF ALL RESPONSES TO PHQ QUESTIONS 1-9: 20
4. FEELING TIRED OR HAVING LITTLE ENERGY: 3
6. FEELING BAD ABOUT YOURSELF - OR THAT YOU ARE A FAILURE OR HAVE LET YOURSELF OR YOUR FAMILY DOWN: 1
5. POOR APPETITE OR OVEREATING: 3
7. TROUBLE CONCENTRATING ON THINGS, SUCH AS READING THE NEWSPAPER OR WATCHING TELEVISION: 2
2. FEELING DOWN, DEPRESSED OR HOPELESS: 3
SUM OF ALL RESPONSES TO PHQ QUESTIONS 1-9: 20
SUM OF ALL RESPONSES TO PHQ QUESTIONS 1-9: 20
10. IF YOU CHECKED OFF ANY PROBLEMS, HOW DIFFICULT HAVE THESE PROBLEMS MADE IT FOR YOU TO DO YOUR WORK, TAKE CARE OF THINGS AT HOME, OR GET ALONG WITH OTHER PEOPLE: 2
SUM OF ALL RESPONSES TO PHQ9 QUESTIONS 1 & 2: 6

## 2024-02-23 NOTE — PROGRESS NOTES
for care  Reviewed gestational age precautions and activity goals/limitations  Nutritional counseling as well as specific goals based on current maternal and fetal status  Options for GERD, constipation, other common complaints reviewed.   Reviewed gestational age appropriate preventive care regarding communicable disease transmission and vaccines as appropriate (including flu, TDaP >28wk, RSV 32-36wk, and COVID.)  Mood counseling today    Exam:     Vitals:    02/23/24 1008   BP: 114/79      Physical Exam  Applicable labs reviewed.   Please see formal ultrasound report under imaging tab.      ASSESSMENT/PLAN:  Patient Active Problem List    Diagnosis Date Noted    Anxiety during pregnancy, antepartum, second trimester 10/17/2022     Priority: Medium     Overview Note:     GORDY, some Depression; hx panic attacks.  Hx seasonal affect disorder  No longer taking Lexapro--states she was actually was only taking \"PRN\" in the past.  Reports has tried another medication of unknown name but \"no help\"  Hx PP Depression w/ G1  Not interested in taking a medication on a daily basis; declines SSRI  interested in Vistaril -- Rx given 11/27/23 12/29/23 [facility]: Taking vistaril for anxiety attacks but reports that they are really not working and it makes her sleepy.  Will get Jennifer to reach out to pt to help her get a new therapist that takes her insurance.  02/23/24 [facility]: Taking Prozac daily since 2/7/24.  Reports improved mood.  Takes Vistaril as needed.      Deliberate self-cutting 02/11/2024    Depression with suicidal ideation 02/07/2024     Overview Note:     Depression w/ periodic SI, hx Cutting, GORDY, hx PP depression, Seasonal affective DO:   See note from 2/7/24 for complete details   Telehealth therapist in [facility] --was told to see a psychiatrist in person   +UDS for THC at preg intake (11/27/23) --reports no longer taking  Was only taking Lexapro \"PRN\" in the past.  tried another medication of unknown name but \"no

## 2024-02-23 NOTE — PATIENT INSTRUCTIONS
Headache and Migraines in Pregnancy      Consideration of these OTC options (tylenol, caffeine, hydration, mag oxide up to 800mg BID, riboflavin 400mg daily; kaylie-relief, excedrin migraine, allergy medications).     Other non-pharm solutions - BeKool-type head patches, essential oils, dark room, warm compresses, mouthguard if jaw clenching/teeth grinding.    If fails all OTC, then reglan/benadryl combo or imitrex/triptans recommended.     If continued pain, recommend referral to neurology for additional recommendations.   Opioid, including fiorcet, may be considered if fails all non-opiate medications. But these are not recommended for use due to rebound headaches and  withdrawal concerns.   Ergotamines are not recommended in pregnancy

## 2024-02-26 ENCOUNTER — FOLLOWUP TELEPHONE ENCOUNTER (OUTPATIENT)
Dept: CASE MANAGEMENT | Age: 39
End: 2024-02-26

## 2024-02-26 NOTE — TELEPHONE ENCOUNTER
Phone call to patient at [phone].     No answer; message left requesting call back.     ZAYNAB Vela-ANA, [facility]-C  [facility]   [phone]

## 2024-02-28 ENCOUNTER — CARE COORDINATION (OUTPATIENT)
Dept: OTHER | Facility: CLINIC | Age: 39
End: 2024-02-28

## 2024-02-28 NOTE — CARE COORDINATION
dating back this far   G3 2023 EAB w/ D&C (pt states she did not have an EAB, removed from pregnancy record)   G4 2023 MAB w/ D&C         x 2 -- pt reports \" no issues\" with either pregnancies.   Denies necessitating IOL with either pregnancies   Denies any hx GDM, GHTN/PreE             20w3d     Barriers/Support system:  mother  Return to work planning? Yes       2nd and 3rd Trimester Focused Assessment: 20w  S/w the pt today she is doing well so far. She is still attending the therapy sessions and trying to get with another provider so once the group sessions end she will have access to mh providers. Pt is also working through getting the information about the termination of her position. Pt has ordered her breast pump but they have informed her that it is on hold. She will call them back to get the information on why this was. Pt had no additional questions or concerns currently. Pt reports that she will more than likely need to keep up the therapy since she has up and down days. Still reporting responding well to medications thus far. So far the baby is doing well on examines. Will continue to follow for MH concerns during pregnancy.        Patient given an opportunity to ask questions and does not have any further questions or concerns at this time. Were discharge instructions available to patient? Yes. Reviewed appropriate site of care based on symptoms and resources available to patient including: Benefits related nurse triage line  When to call 911  Condition related references. The patient agrees to contact the OB/Gyn office for questions related to their healthcare.     Plan for follow-up call in 10-14 days based on severity of symptoms and risk factors.  Plan for next call: self management-prenatal care high risk

## 2024-03-05 ENCOUNTER — HOSPITAL ENCOUNTER (OUTPATIENT)
Age: 39
Discharge: HOME | End: 2024-03-05
Attending: OBSTETRICS & GYNECOLOGY | Admitting: OBSTETRICS & GYNECOLOGY
Payer: MEDICAID

## 2024-03-05 VITALS
TEMPERATURE: 98.3 F | SYSTOLIC BLOOD PRESSURE: 126 MMHG | RESPIRATION RATE: 20 BRPM | OXYGEN SATURATION: 99 % | HEART RATE: 90 BPM | DIASTOLIC BLOOD PRESSURE: 77 MMHG

## 2024-03-05 PROCEDURE — 6370000000 HC RX 637 (ALT 250 FOR IP): Performed by: OBSTETRICS & GYNECOLOGY

## 2024-03-05 PROCEDURE — 99283 EMERGENCY DEPT VISIT LOW MDM: CPT

## 2024-03-05 RX ORDER — VALACYCLOVIR HYDROCHLORIDE 500 MG/1
1000 TABLET, FILM COATED ORAL DAILY
COMMUNITY

## 2024-03-05 RX ORDER — ACETAMINOPHEN 500 MG
1000 TABLET ORAL ONCE
Status: COMPLETED | OUTPATIENT
Start: 2024-03-05 | End: 2024-03-05

## 2024-03-05 RX ADMIN — ACETAMINOPHEN 1000 MG: 500 TABLET, FILM COATED ORAL at 21:19

## 2024-03-05 ASSESSMENT — PAIN SCALES - GENERAL: PAINLEVEL_OUTOF10: 5

## 2024-03-05 ASSESSMENT — PAIN DESCRIPTION - LOCATION: LOCATION: HEAD

## 2024-03-06 ENCOUNTER — CARE COORDINATION (OUTPATIENT)
Dept: OTHER | Facility: CLINIC | Age: 39
End: 2024-03-06

## 2024-03-06 PROBLEM — R42 DIZZINESS: Status: RESOLVED | Noted: 2023-07-14 | Resolved: 2024-03-06

## 2024-03-06 NOTE — CARE COORDINATION
Ambulatory Care Coordination Note    ACM attempted to reach patient for Associate Care Management related to MCM follow up and ED follow-up. HIPAA compliant message left requesting a return phone call at patient convenience.     Plan for follow-up call in 3-5 days      Future Appointments   Date Time Provider Department Center   3/8/2024 11:15 AM Carri Vilchis MD [facility] 1 GVL AMB   3/19/2024 10:30 AM [facility]   4/8/2024 10:15 AM [facility] ULTRASOUND 1 [facility] GV AMB   4/8/2024 11:15 AM Elsa Jarvis MD [facility] GVL AMB   5/14/2024  3:00 PM [facility]ious [facility] GVL AMB   8/8/2024  2:15 PM Ivania Jones MD [facility] AMB       IZABEL Martinez, RN  Associate Care Manager   Cell: [phone]  [email or web address]
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
## 2024-03-06 NOTE — PROGRESS NOTES
OB return  Юлия Quintero is a 38 y.o. female   with 22w5d IUP with Estimated Date of Delivery: 24 presenting to the clinic as a routine OB.   Doesn't think [facility] is working but has appointment with [facility] Monday. She thinks this should help her more.    Problem list reviewed and updated    Pregnancy complicated by:  1. CHTN- not on bp meds, on baby ASA  2. Hx of liver hemangioma- seen on imaging in , avoid estrogen, baseline LFT  3. Hx of LEEP in , after 2 , MFM referral, CL screening  4. AMA- 38 yr old, low risk NIPT, normal anatomy   5. 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 GORDY, hx of depression, hx of panic attacks, hx of pp depression, hx of SI- hx of lexapro- prn vistaril, see note from Dr. Jones with details, recommend psychiatrist, information to [facility] and [employer] placed, SW consult in place, started on prozac 20mg daily, offered transfer to [facility] due to addition resources, seeing [facility]  7. Asthma- inhaler prn  8. Hx of hypothryoid- not on meds  9. Hx of THC- +UDS 2023, cord segment at delivery    Physical Examination:  /74   Wt 73.8 kg (162 lb 12.8 oz)   LMP 10/01/2023   BMI 29.78 kg/m²    Gen: AAOx3  Ab: soft, NTTP, gravid uterus  Skin: no edema    Plan:  --RTC 4 weeks with glucola

## 2024-03-06 NOTE — H&P
11/01/2022    Last imaging CT and Abd US (4/26/2022):  Impression:  6.3 cm echogenic mass right hepatic lobe. Nonspecific but most likely related to large cavernous hemangioma. Recommend multiphasic hepatic CT or MRI for further characterization.   *I do not see a FU MRI that has been performed     Avoid estrogen contracep postpartumtives     LFTs today__    Liver mass     Mixed hyperlipidemia 11/01/2022    Other ill-defined conditions(799.89)     chronic acne    PCOS (polycystic ovarian syndrome)     Pruritic disorder 12/29/2023    PTSD (post-traumatic stress disorder)     Seasonal affective disorder (HCC)     Shingles     2 weeks ago    Subclinical hypothyroidism 11/01/2022    Tetrahydrocannabinol (THC) use disorder, mild, abuse     Thyroid disease     hypothyroid. stable w/o med since 6/2012    Vasovagal attack 02/11/2024     Past Surgical History:   Procedure Laterality Date    DILATION AND CURETTAGE OF UTERUS  06/01/2023    x1 per patient    LEEP  2013    Cervical dysplasia-LEEP      Social History     Occupational History    Not on file   Tobacco Use    Smoking status: Never    Smokeless tobacco: Never   Vaping Use    Vaping Use: Never used   Substance and Sexual Activity    Alcohol use: Not Currently    Drug use: Not Currently    Sexual activity: Yes     Partners: Male     Birth control/protection: Inserts      Family History   Problem Relation Age of Onset    Diabetes Paternal Grandmother     Diabetes Maternal Grandmother     Asthma Mother     Migraines Mother     Diabetes Mother     Anxiety Disorder Mother     Mental Illness Mother     Asthma Brother     Breast Cancer Maternal Aunt     Cancer Maternal Uncle         lung    Breast Cancer Maternal Cousin         late 20's    Breast Cancer Other         maternal great aunt    Ovarian Cancer Neg Hx     Colon Cancer Neg Hx        Allergies   Allergen Reactions    Fluconazole Hives    Propranolol Rash     Prior to Admission medications    Medication Sig Start

## 2024-03-07 ENCOUNTER — CARE COORDINATION (OUTPATIENT)
Dept: OTHER | Facility: CLINIC | Age: 39
End: 2024-03-07

## 2024-03-07 NOTE — TELEPHONE ENCOUNTER
Dr. Mendez, patient interested in free prenatal vitamins and iron per response from Maternity Risk Survey.    Order for [facility] Baby Box pending for your convenience.    Thank you,  Camille Tejada, PharmD  Ambulatory Care Clinical Pharmacist- [facility] Clinical Pharmacy  Department, toll free: [phone]  [facility]      =======================================================================    [facility] CLINICAL PHARMACY REVIEW - Be Well With Baby    SUBJECTIVE  Юлия Quintero is a 38 y.o. female currently identified as interested in receiving a BS \"Baby Box\" containing a 1 year supply of prenatal vitamins from [facility].    Contents of Baby Box:  Welcome Letter  6 month supply of Nature's Blend Prenatal Multivitamin with Minerals (Take 1 softgel once daily) with 1 refill    Thank you  Camille Tejada, PharmD  Ambulatory Care Clinical Pharmacist- [facility] Clinical Pharmacy  Department, toll free: [phone]  [facility]

## 2024-03-08 ENCOUNTER — ROUTINE PRENATAL (OUTPATIENT)
Dept: OBGYN CLINIC | Age: 39
End: 2024-03-08
Payer: MEDICAID

## 2024-03-08 VITALS — BODY MASS INDEX: 29.78 KG/M2 | WEIGHT: 162.8 LBS | DIASTOLIC BLOOD PRESSURE: 74 MMHG | SYSTOLIC BLOOD PRESSURE: 128 MMHG

## 2024-03-08 DIAGNOSIS — O98.319 GENITAL HERPES SIMPLEX VIRUS (HSV) INFECTION IN MOTHER AFFECTING PREGNANCY: ICD-10-CM

## 2024-03-08 DIAGNOSIS — F41.9 ANXIETY DURING PREGNANCY, ANTEPARTUM, SECOND TRIMESTER: ICD-10-CM

## 2024-03-08 DIAGNOSIS — F32.A DEPRESSION WITH SUICIDAL IDEATION: ICD-10-CM

## 2024-03-08 DIAGNOSIS — O99.342 ANXIETY DURING PREGNANCY, ANTEPARTUM, SECOND TRIMESTER: ICD-10-CM

## 2024-03-08 DIAGNOSIS — O09.522 HIGH RISK PREGNANCY, MULTIGRAVIDA OF ADVANCED MATERNAL AGE IN SECOND TRIMESTER: Primary | ICD-10-CM

## 2024-03-08 DIAGNOSIS — R45.851 DEPRESSION WITH SUICIDAL IDEATION: ICD-10-CM

## 2024-03-08 DIAGNOSIS — A60.09 GENITAL HERPES SIMPLEX VIRUS (HSV) INFECTION IN MOTHER AFFECTING PREGNANCY: ICD-10-CM

## 2024-03-08 PROCEDURE — 99213 OFFICE O/P EST LOW 20 MIN: CPT | Performed by: OBSTETRICS & GYNECOLOGY

## 2024-03-12 ENCOUNTER — HOSPITAL ENCOUNTER (OUTPATIENT)
Age: 39
Discharge: HOME | End: 2024-03-12
Attending: OBSTETRICS & GYNECOLOGY | Admitting: OBSTETRICS & GYNECOLOGY
Payer: MEDICAID

## 2024-03-12 ENCOUNTER — TELEPHONE (OUTPATIENT)
Dept: OBGYN CLINIC | Age: 39
End: 2024-03-12

## 2024-03-12 VITALS
SYSTOLIC BLOOD PRESSURE: 114 MMHG | RESPIRATION RATE: 18 BRPM | DIASTOLIC BLOOD PRESSURE: 69 MMHG | HEART RATE: 92 BPM | TEMPERATURE: 98 F | OXYGEN SATURATION: 99 %

## 2024-03-12 PROBLEM — R10.9 ABDOMINAL PAIN IN PREGNANCY, SECOND TRIMESTER: Status: ACTIVE | Noted: 2024-03-12

## 2024-03-12 PROBLEM — Z3A.23 23 WEEKS GESTATION OF PREGNANCY: Status: ACTIVE | Noted: 2024-03-12

## 2024-03-12 PROBLEM — M54.9 BACK PAIN AFFECTING PREGNANCY IN SECOND TRIMESTER: Status: ACTIVE | Noted: 2024-03-12

## 2024-03-12 PROBLEM — O26.892 ABDOMINAL PAIN IN PREGNANCY, SECOND TRIMESTER: Status: ACTIVE | Noted: 2024-03-12

## 2024-03-12 PROBLEM — O99.891 BACK PAIN AFFECTING PREGNANCY IN SECOND TRIMESTER: Status: ACTIVE | Noted: 2024-03-12

## 2024-03-12 PROCEDURE — 59025 FETAL NON-STRESS TEST: CPT

## 2024-03-12 PROCEDURE — 6370000000 HC RX 637 (ALT 250 FOR IP): Performed by: OBSTETRICS & GYNECOLOGY

## 2024-03-12 PROCEDURE — 99285 EMERGENCY DEPT VISIT HI MDM: CPT

## 2024-03-12 RX ORDER — ACETAMINOPHEN 500 MG
1000 TABLET ORAL ONCE
Status: COMPLETED | OUTPATIENT
Start: 2024-03-12 | End: 2024-03-12

## 2024-03-12 RX ORDER — SIMETHICONE 80 MG
80 TABLET,CHEWABLE ORAL ONCE
Status: COMPLETED | OUTPATIENT
Start: 2024-03-12 | End: 2024-03-12

## 2024-03-12 RX ADMIN — ACETAMINOPHEN 1000 MG: 500 TABLET, FILM COATED ORAL at 16:29

## 2024-03-12 RX ADMIN — SIMETHICONE 80 MG: 80 TABLET, CHEWABLE ORAL at 16:30

## 2024-03-12 ASSESSMENT — ENCOUNTER SYMPTOMS
NAUSEA: 1
VOMITING: 0
BACK PAIN: 1
ABDOMINAL PAIN: 1
DIARRHEA: 0
RECTAL PAIN: 0
CONSTIPATION: 1

## 2024-03-12 NOTE — TELEPHONE ENCOUNTER
Answered pt call to the office. Pt states she has severe abd pain/nausea. Vomiting on phone. Had intercourse last night. Thinks she may be leaking fluid. Advised to have someone else drive her to OB triage now. Pt agreed with plan.

## 2024-03-12 NOTE — DISCHARGE INSTRUCTIONS
Gas and Bloating: Care Instructions  Overview     Gas and bloating can be uncomfortable and embarrassing problems. All people pass gas, but some people produce more gas than others, sometimes enough to cause distress. It is normal to pass gas from 6 to 20 times per day. Excess gas usually is not caused by a serious health problem.  Gas and bloating usually are caused by something you eat or drink, including some food supplements and medicines.  Gas and bloating are usually harmless and go away without treatment. However, changing your diet can help end the problem. Some over-the-counter medicines can help prevent gas and relieve bloating.  Follow-up care is a key part of your treatment and safety. Be sure to make and go to all appointments, and call your doctor if you are having problems. It's also a good idea to know your test results and keep a list of the medicines you take.  How can you care for yourself at home?  Keep a food diary if you think a food gives you gas. Write down what you eat or drink. Also record when you get gas. If you notice that a food seems to cause your gas each time, avoid it and see if the gas goes away. Examples of foods that cause gas include:  Fried and fatty foods.  Peas, lentils, and beans.  Vegetables such as artichokes, asparagus, broccoli, brussels sprouts, cabbage, cauliflower, cucumbers, green peppers, onions, radishes, and raw potatoes.  Fruits such as apricots, bananas, melons, peaches, pears, prunes, and raw apples.  Wheat and wheat bran.  Carbonated drinks, fruit drinks, beer, and red wine.  Packaged foods that contain lactose, such as breads, cereal, and salad dressing.  Sugar and sugar substitutes.  Try soaking beans in water overnight. Drain the soaking water, and cook the soaked beans in new water. This may help decrease gas and bloating.  If you have problems with lactose, avoid dairy products such as milk and cheese.  Try not to swallow air. Do not drink through a

## 2024-03-12 NOTE — PROGRESS NOTES
Patient discharged home per MD order. Discharge instructions completed and patient verbalized understanding. Questions encouraged. Accompanied by family. Stable at discharge.

## 2024-03-12 NOTE — H&P
care    Consultations Ordered:  None    Electronically signed by Kera Hinkle MD on 3/12/24 at 4:17 PM EDT

## 2024-03-13 ENCOUNTER — CARE COORDINATION (OUTPATIENT)
Dept: OTHER | Facility: CLINIC | Age: 39
End: 2024-03-13

## 2024-03-14 ENCOUNTER — CARE COORDINATION (OUTPATIENT)
Dept: OTHER | Facility: CLINIC | Age: 39
End: 2024-03-14

## 2024-03-14 NOTE — CARE COORDINATION
Received a voice message from the pt. She was returning my call from yesterday.     ACM attempted to reach patient for Maternity Care Management/ Care transitions call. HIPAA compliant message left requesting a return phone call at patients convenience. Will continue to follow.

## 2024-03-19 ENCOUNTER — CARE COORDINATION (OUTPATIENT)
Dept: OTHER | Facility: CLINIC | Age: 39
End: 2024-03-19

## 2024-03-19 NOTE — CARE COORDINATION
Call within 2 business days of discharge: Yes     Patient Current Location: [place]    Last Discharge Facility       Date Complaint Diagnosis Description Type Department Provider    3/12/24 Abdominal Pain  Admission (Discharged) [id number] Ivania Jones MD       Maternity Care Manager contacted the patient by telephone to discuss the maternity management program.  Patient agrees to care management services at this time. Verified name and  with patient as identifiers.     Risk Factors Identified: Depression/Mental Health   AMA    Needs to be reviewed by the provider   none         Method of communication with provider : none    Advance Care Planning:   Does patient have an Advance Directive:  not on file.     Does patient have OB/Gyn Selected? Yes    Discussed follow up appointments. If no appointment was previously scheduled, appointment scheduling offered: Yes  [facility] follow up appointment(s):   Future Appointments   Date Time Provider Department Center   2024  1:30 PM Ivania Joens MD [facility] GVL AMB   2024 10:15 AM [facility] ULTRASOUND 1 [facility] AMB   2024 11:15 AM Elsa Jarvis MD [facility] GVL AMB   2024  3:00 PM Precious Rowland [facility] GVL AMB   2024  2:15 PM Ivania Jones MD [facility] AMB     Non-[facility] follow up appointment(s):     OB History:   OB History    Para Term  AB Living   4 2 2   1 2   SAB IAB Ectopic Molar Multiple Live Births   1 0       2      # Outcome Date GA Lbr David/2nd Weight Sex Delivery Anes PTL Lv   4 Current            3 SAB 2023           2 Term 08 39w0d  3.572 kg (7 lb 14 oz) F Vag-Spont None N SVITLANA      Birth Comments: Odogwu   1 Term 05/15/06 39w0d  4.054 kg (8 lb 15 oz) M Vag-Spont EPI N SVITLANA      Birth Comments: Kenrick      Obstetric Comments   OB hx:   G1  term  per patient (Dr Carlton) --no records in care everywhere dating back this far.    G2  term  per patient (Dr Carlton) --no records in care everywhere dating back

## 2024-03-22 ENCOUNTER — TELEPHONE (OUTPATIENT)
Dept: OBGYN CLINIC | Age: 39
End: 2024-03-22

## 2024-03-22 NOTE — TELEPHONE ENCOUNTER
Patient called complaining of decrease fetal movement since yesterday.  I instructed the patient to go straight [facility].  Patient voiced understanding.

## 2024-03-27 ENCOUNTER — CARE COORDINATION (OUTPATIENT)
Dept: OTHER | Facility: CLINIC | Age: 39
End: 2024-03-27

## 2024-03-28 NOTE — PROGRESS NOTES
OB return  Юлия Quintero is a 38 y.o. female   with 25w5d IUP with Estimated Date of Delivery: 24 presenting to the clinic as a routine OB.     Patient informs me that she was seen in ER on 3/12 due to lower abdominal pain and back pain. After evaluation, it was deemed patient did not have acute infection and was not in  labor and she was discharged home.     She denies feeling regular contractions, leakage of fluid or vaginal bleeding and reports active fetal movement.     She reports experiencing headaches weekly however, states \"it is because I am hungry because after I eat, the headache goes away. \" She denies blurred vision or RUQ pain.     FHTs: 140s    Problem list reviewed and updated    Pregnancy complicated by:  1. CHTN- not on bp meds, on baby ASA  2. Hx of liver hemangioma- seen on imaging in , avoid estrogen, baseline LFT  3. Hx of LEEP in , after 2 , MFM referral, CL screening  4. AMA- 38 yr old, low risk NIPT, normal anatomy. 24 [facility]: Normal anatomy and echo; For full details review formal ultrasound report. Sees mfm on 24.   5. 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 GORDY, hx of depression, hx of panic attacks, hx of pp depression, hx of SI- hx of lexapro- prn vistaril, see note from Dr. Jones with details, recommend psychiatrist, information to [facility] and [facility] placed,  consult in place, started on prozac 20mg daily, offered transfer to [facility] due to addition resources, seeing [facility]. Continues taking Prozac daily.   7. Asthma- inhaler prn  8. Hx of hypothryoid- not on meds. TSH wnl collected on 24.   9. Hx of THC- +UDS 2023, cord segment at delivery  10. PCOS: HgbA1C: 5.0    Physical Examination:  /66   Wt 76.7 kg (169 lb)   LMP 10/01/2023   BMI 30.91 kg/m²    Gen: AAOx3  Ab: soft, NTTP, gravid uterus  Skin: no edema    Plan:  --CBC and Gtt collected today  --Encouraged patient to increase water intake daily to prevent dehydration.

## 2024-03-29 ENCOUNTER — ROUTINE PRENATAL (OUTPATIENT)
Dept: OBGYN CLINIC | Age: 39
End: 2024-03-29

## 2024-03-29 VITALS — SYSTOLIC BLOOD PRESSURE: 104 MMHG | BODY MASS INDEX: 30.91 KG/M2 | WEIGHT: 169 LBS | DIASTOLIC BLOOD PRESSURE: 66 MMHG

## 2024-03-29 DIAGNOSIS — Z72.89 DELIBERATE SELF-CUTTING: ICD-10-CM

## 2024-03-29 DIAGNOSIS — J45.20 MILD INTERMITTENT ASTHMA WITHOUT COMPLICATION: ICD-10-CM

## 2024-03-29 DIAGNOSIS — F41.9 ANXIETY DURING PREGNANCY, ANTEPARTUM, SECOND TRIMESTER: ICD-10-CM

## 2024-03-29 DIAGNOSIS — Z87.42 HISTORY OF POLYCYSTIC OVARIAN SYNDROME: ICD-10-CM

## 2024-03-29 DIAGNOSIS — R45.851 DEPRESSION WITH SUICIDAL IDEATION: ICD-10-CM

## 2024-03-29 DIAGNOSIS — Z86.39 HISTORY OF HYPOTHYROIDISM: ICD-10-CM

## 2024-03-29 DIAGNOSIS — O09.522 HIGH RISK PREGNANCY, MULTIGRAVIDA OF ADVANCED MATERNAL AGE IN SECOND TRIMESTER: Primary | ICD-10-CM

## 2024-03-29 DIAGNOSIS — O99.891 BACK PAIN AFFECTING PREGNANCY IN SECOND TRIMESTER: ICD-10-CM

## 2024-03-29 DIAGNOSIS — F12.90 MARIJUANA USE DURING PREGNANCY: ICD-10-CM

## 2024-03-29 DIAGNOSIS — O09.522 HIGH RISK PREGNANCY, MULTIGRAVIDA OF ADVANCED MATERNAL AGE IN SECOND TRIMESTER: ICD-10-CM

## 2024-03-29 DIAGNOSIS — O99.320 MARIJUANA USE DURING PREGNANCY: ICD-10-CM

## 2024-03-29 DIAGNOSIS — Z13.1 SCREENING FOR DIABETES MELLITUS: ICD-10-CM

## 2024-03-29 DIAGNOSIS — Z98.890 HISTORY OF LOOP ELECTROSURGICAL EXCISION PROCEDURE (LEEP) OF CERVIX AFFECTING PREGNANCY IN SECOND TRIMESTER: ICD-10-CM

## 2024-03-29 DIAGNOSIS — M54.9 BACK PAIN AFFECTING PREGNANCY IN SECOND TRIMESTER: ICD-10-CM

## 2024-03-29 DIAGNOSIS — O98.319 GENITAL HERPES SIMPLEX VIRUS (HSV) INFECTION IN MOTHER AFFECTING PREGNANCY: ICD-10-CM

## 2024-03-29 DIAGNOSIS — O10.919 CHRONIC HYPERTENSION AFFECTING PREGNANCY: ICD-10-CM

## 2024-03-29 DIAGNOSIS — Z3A.25 25 WEEKS GESTATION OF PREGNANCY: ICD-10-CM

## 2024-03-29 DIAGNOSIS — O34.42 HISTORY OF LOOP ELECTROSURGICAL EXCISION PROCEDURE (LEEP) OF CERVIX AFFECTING PREGNANCY IN SECOND TRIMESTER: ICD-10-CM

## 2024-03-29 DIAGNOSIS — F32.A DEPRESSION WITH SUICIDAL IDEATION: ICD-10-CM

## 2024-03-29 DIAGNOSIS — A60.09 GENITAL HERPES SIMPLEX VIRUS (HSV) INFECTION IN MOTHER AFFECTING PREGNANCY: ICD-10-CM

## 2024-03-29 DIAGNOSIS — O99.342 ANXIETY DURING PREGNANCY, ANTEPARTUM, SECOND TRIMESTER: ICD-10-CM

## 2024-03-29 DIAGNOSIS — Z80.3 FAMILY HISTORY OF BREAST CANCER: ICD-10-CM

## 2024-03-29 LAB
ERYTHROCYTE [DISTWIDTH] IN BLOOD BY AUTOMATED COUNT: 14.5 % (ref 11.9–14.6)
GLUCOSE 1 HOUR: 145 MG/DL
HCT VFR BLD AUTO: 35.7 % (ref 35.8–46.3)
HGB BLD-MCNC: 11.2 G/DL (ref 11.7–15.4)
MCH RBC QN AUTO: 28.9 PG (ref 26.1–32.9)
MCHC RBC AUTO-ENTMCNC: 31.4 G/DL (ref 31.4–35)
MCV RBC AUTO: 92.2 FL (ref 82–102)
NRBC # BLD: 0 K/UL (ref 0–0.2)
PLATELET # BLD AUTO: 216 K/UL (ref 150–450)
PMV BLD AUTO: 12.1 FL (ref 9.4–12.3)
RBC # BLD AUTO: 3.87 M/UL (ref 4.05–5.2)
WBC # BLD AUTO: 8.7 K/UL (ref 4.3–11.1)

## 2024-04-01 ENCOUNTER — TELEPHONE (OUTPATIENT)
Dept: OBGYN CLINIC | Age: 39
End: 2024-04-01

## 2024-04-01 NOTE — RESULT ENCOUNTER NOTE
CBC: Hgb: 11.2 (decrease from previous collection on 2/11/24=12.2). Recommend taking Fe/Vit C daily together however, separate from PNV and will plan to recollect Hgb in 4-6 weeks.     Did not pass 1 hour gtt (145). Recommended either taking 3 hour gtt or collecting blood sugars 4 times daily x 2 weeks as this is standard of care.

## 2024-04-01 NOTE — TELEPHONE ENCOUNTER
LVM that pt needs to schedule a 3hr gtt rather than a 1hr gtt.    Contacted patient and explained the need for the 3hr gtt. Patient verbalized understanding and agreed to arrive to [facility] in the morning to get her drink and begin the test as well as understanding that she must be fasted for 8 hours prior to the test.

## 2024-04-04 ENCOUNTER — CARE COORDINATION (OUTPATIENT)
Dept: OTHER | Facility: CLINIC | Age: 39
End: 2024-04-04

## 2024-04-04 PROBLEM — Z3A.23 23 WEEKS GESTATION OF PREGNANCY: Status: RESOLVED | Noted: 2024-03-12 | Resolved: 2024-04-04

## 2024-04-04 NOTE — CARE COORDINATION
Placed a f/u call to the pt today after missed call from her yesterday 4/3/24. There was no answer and the pt vm is full. Will continue to follow.

## 2024-04-05 DIAGNOSIS — O09.522 HIGH RISK PREGNANCY, MULTIGRAVIDA OF ADVANCED MATERNAL AGE IN SECOND TRIMESTER: ICD-10-CM

## 2024-04-05 DIAGNOSIS — O09.522 HIGH RISK PREGNANCY, MULTIGRAVIDA OF ADVANCED MATERNAL AGE IN SECOND TRIMESTER: Primary | ICD-10-CM

## 2024-04-05 LAB
GESTATIONAL 3HR GTT: NORMAL
GLUCOSE 1H P 100 G GLC PO SERPL-MCNC: 144 MG/DL (ref 65–180)
GLUCOSE 2 HOUR: 118 MG/DL (ref 65–155)
GLUCOSE P FAST SERPL-MCNC: 82 MG/DL (ref 65–95)
GLUCOSE, 3 HOUR: 111 MG/DL (ref 65–140)

## 2024-04-05 NOTE — CARE COORDINATION
Call within 2 business days of discharge: Yes     Patient Current Location: [place]    Last Discharge Facility       Date Complaint Diagnosis Description Type Department Provider    3/12/24 Abdominal Pain  Admission (Discharged) [id number] Rosario MD            Maternity Care Manager contacted the patient by telephone to discuss the maternity management program.  Patient agrees to care management services at this time. Verified name and  with patient as identifiers.     Does patient have OB/Gyn Selected? Yes    Discussed follow up appointments. If no appointment was previously scheduled, appointment scheduling offered: Yes  [facility] follow up appointment(s):   Future Appointments   Date Time Provider Department Center   2024 10:15 AM [facility] ULTRASOUND 1 [facility] GVL AMB   2024 11:15 AM Elsa Jarvis MD [facility] GVL AMB   2024 10:30 AM Cris Flores DO [facility] 1 GVL AMB   2024  3:00 PM Precious Rowland [facility] GVL AMB     Non-[facility] follow up appointment(s): n/a    OB History:   OB History    Para Term  AB Living   4 2 2   1 2   SAB IAB Ectopic Molar Multiple Live Births   1 0       2      # Outcome Date GA Lbr David/2nd Weight Sex Delivery Anes PTL Lv   4 Current            3 SAB 2023           2 Term 08 39w0d  3.572 kg (7 lb 14 oz) F Vag-Spont None N SVITLANA      Birth Comments: Odogwu   1 Term 05/15/06 39w0d  4.054 kg (8 lb 15 oz) M Vag-Spont EPI N SVITLANA      Birth Comments: Kenrick      Obstetric Comments   OB hx:   G1  term  per patient (Dr Carlton) --no records in care everywhere dating back this far.    G2  term  per patient (Dr Carlton) --no records in care everywhere dating back this far   G3 2023 EAB w/ D&C (pt states she did not have an EAB, removed from pregnancy record)   G4 2023 MAB w/ D&C        x 2 -- pt reports \" no issues\" with either pregnancies.   Denies necessitating IOL with either pregnancies   Denies any hx GDM, GHTN/PreE

## 2024-04-08 ENCOUNTER — ROUTINE PRENATAL (OUTPATIENT)
Dept: OBGYN CLINIC | Age: 39
End: 2024-04-08
Payer: MEDICAID

## 2024-04-08 VITALS — SYSTOLIC BLOOD PRESSURE: 122 MMHG | DIASTOLIC BLOOD PRESSURE: 84 MMHG

## 2024-04-08 DIAGNOSIS — O99.512 ASTHMA AFFECTING PREGNANCY IN SECOND TRIMESTER: ICD-10-CM

## 2024-04-08 DIAGNOSIS — O98.319 GENITAL HERPES SIMPLEX VIRUS (HSV) INFECTION IN MOTHER AFFECTING PREGNANCY: ICD-10-CM

## 2024-04-08 DIAGNOSIS — A60.09 GENITAL HERPES SIMPLEX VIRUS (HSV) INFECTION IN MOTHER AFFECTING PREGNANCY: ICD-10-CM

## 2024-04-08 DIAGNOSIS — O99.342 ANXIETY DURING PREGNANCY, ANTEPARTUM, SECOND TRIMESTER: ICD-10-CM

## 2024-04-08 DIAGNOSIS — F12.90 MARIJUANA USE DURING PREGNANCY: ICD-10-CM

## 2024-04-08 DIAGNOSIS — Z87.42 HISTORY OF POLYCYSTIC OVARIAN SYNDROME: ICD-10-CM

## 2024-04-08 DIAGNOSIS — R00.2 PALPITATION: ICD-10-CM

## 2024-04-08 DIAGNOSIS — O10.919 CHRONIC HYPERTENSION AFFECTING PREGNANCY: ICD-10-CM

## 2024-04-08 DIAGNOSIS — O99.320 MARIJUANA USE DURING PREGNANCY: ICD-10-CM

## 2024-04-08 DIAGNOSIS — O34.42 HISTORY OF LOOP ELECTROSURGICAL EXCISION PROCEDURE (LEEP) OF CERVIX AFFECTING PREGNANCY IN SECOND TRIMESTER: ICD-10-CM

## 2024-04-08 DIAGNOSIS — Z98.890 HISTORY OF LOOP ELECTROSURGICAL EXCISION PROCEDURE (LEEP) OF CERVIX AFFECTING PREGNANCY IN SECOND TRIMESTER: ICD-10-CM

## 2024-04-08 DIAGNOSIS — F32.A DEPRESSION WITH SUICIDAL IDEATION: ICD-10-CM

## 2024-04-08 DIAGNOSIS — R45.851 DEPRESSION WITH SUICIDAL IDEATION: ICD-10-CM

## 2024-04-08 DIAGNOSIS — Z3A.27 27 WEEKS GESTATION OF PREGNANCY: ICD-10-CM

## 2024-04-08 DIAGNOSIS — J45.909 ASTHMA AFFECTING PREGNANCY IN SECOND TRIMESTER: ICD-10-CM

## 2024-04-08 DIAGNOSIS — R00.2 HEART PALPITATIONS: ICD-10-CM

## 2024-04-08 DIAGNOSIS — F41.9 ANXIETY DURING PREGNANCY, ANTEPARTUM, SECOND TRIMESTER: ICD-10-CM

## 2024-04-08 DIAGNOSIS — O09.522 HIGH RISK PREGNANCY, MULTIGRAVIDA OF ADVANCED MATERNAL AGE IN SECOND TRIMESTER: Primary | ICD-10-CM

## 2024-04-08 PROBLEM — R10.9 ABDOMINAL PAIN IN PREGNANCY, SECOND TRIMESTER: Status: RESOLVED | Noted: 2024-03-12 | Resolved: 2024-04-08

## 2024-04-08 PROBLEM — O26.892 ABDOMINAL PAIN IN PREGNANCY, SECOND TRIMESTER: Status: RESOLVED | Noted: 2024-03-12 | Resolved: 2024-04-08

## 2024-04-08 PROBLEM — M54.9 BACK PAIN AFFECTING PREGNANCY IN SECOND TRIMESTER: Status: RESOLVED | Noted: 2024-03-12 | Resolved: 2024-04-08

## 2024-04-08 PROBLEM — O99.891 BACK PAIN AFFECTING PREGNANCY IN SECOND TRIMESTER: Status: RESOLVED | Noted: 2024-03-12 | Resolved: 2024-04-08

## 2024-04-08 PROCEDURE — 76816 OB US FOLLOW-UP PER FETUS: CPT | Performed by: OBSTETRICS & GYNECOLOGY

## 2024-04-08 PROCEDURE — 93325 DOPPLER ECHO COLOR FLOW MAPG: CPT | Performed by: OBSTETRICS & GYNECOLOGY

## 2024-04-08 PROCEDURE — 76828 ECHO EXAM OF FETAL HEART: CPT | Performed by: OBSTETRICS & GYNECOLOGY

## 2024-04-08 PROCEDURE — 76819 FETAL BIOPHYS PROFIL W/O NST: CPT | Performed by: OBSTETRICS & GYNECOLOGY

## 2024-04-08 PROCEDURE — 99214 OFFICE O/P EST MOD 30 MIN: CPT | Performed by: OBSTETRICS & GYNECOLOGY

## 2024-04-08 PROCEDURE — 76826 ECHO EXAM OF FETAL HEART: CPT | Performed by: OBSTETRICS & GYNECOLOGY

## 2024-04-08 RX ORDER — MULTIVIT-MIN/IRON/FOLIC ACID/K 18-600-40
CAPSULE ORAL
COMMUNITY

## 2024-04-08 ASSESSMENT — PATIENT HEALTH QUESTIONNAIRE - PHQ9
SUM OF ALL RESPONSES TO PHQ9 QUESTIONS 1 & 2: 1
SUM OF ALL RESPONSES TO PHQ QUESTIONS 1-9: 1
2. FEELING DOWN, DEPRESSED OR HOPELESS: SEVERAL DAYS
1. LITTLE INTEREST OR PLEASURE IN DOING THINGS: NOT AT ALL
SUM OF ALL RESPONSES TO PHQ QUESTIONS 1-9: 1

## 2024-04-08 NOTE — ASSESSMENT & PLAN NOTE
Patient has Chronic HTN. BP stable today.  .   Plan delivery for chronic hypertension not on meds is in 38-39 weeks, all timing adjusted based on maternal and fetal condition.    Recommend adding or changing medication prn to keep SBP < 140, DBP < 90.   Low dose Aspirin (162 mg daily) daily for the prevention of preeclampsia in high risk women.  Recommend serial growth ultrasounds in third trimester, as well as  testing for maternal and fetal well-being surveillance.   Close monitoring of BP through 2 weeks postpartum to maintain <140/90.  Recommend in office BP check day 5-7 after delivery.   Recommend 20mg PO lasix x 5 days beginning PPD #1.     Counseling re risks of worsening in pregnancy and postpartum reviewed, patient should monitor BP through 6 weeks postpartum.

## 2024-04-08 NOTE — PATIENT INSTRUCTIONS
Resources for Depression/Anxiety  Postpartum Support International (PSI).    PSI Warmline:  8-376-508-4PPD (8983).  WWW.[email or web address].NET    Mom's IMPACTT  [email or web address]       In order to optimize maternal, fetal, and  health, we recommend the following vaccinations.   Flu- yearly (https://www.highriskpregnancyinfo.org/flu-facts-for-pregnancy)  Consider Covid vaccination/booster (https://www.highriskpregnancyinfo.org/covid-19-pregnancy)  TDaP after 28 weeks each pregnancy (https://www.highriskpregnancyinfo.org/tdap)  Consider RSV vaccine 32-36 weeks of pregnancy, if Sept- January.  (https://www.highriskpregnancyinfo.org/rsv)

## 2024-04-08 NOTE — PROGRESS NOTES
[facility] MATERNAL FETAL MEDICINE    [address] [place] [address]  P- [phone]  [phone]       MFM Follow-up Visit  Юлия Quintero (1985) is a 38 y.o.  at 27w1d with 2024, by Last Menstrual Period.   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     [facility]     Reports occasional HA s-encouraged to increase water, take Excedrin tension. denies edema. No bleeding, LOF, cramping, ctxs, or vaginal pressure. Reports fetal movement.     Mood evaluated today based on discussion with pt and PHQ screen.       2024     9:48 AM   PHQ-9    Little interest or pleasure in doing things 3   Feeling down, depressed, or hopeless 3   Trouble falling or staying asleep, or sleeping too much 3   Feeling tired or having little energy 3   Poor appetite or overeating 3   Feeling bad about yourself - or that you are a failure or have let yourself or your family down 1   Trouble concentrating on things, such as reading the newspaper or watching television 2   Moving or speaking so slowly that other people could have noticed. Or the opposite - being so fidgety or restless that you have been moving around a lot more than usual 2   Thoughts that you would be better off dead, or of hurting yourself in some way 0   PHQ-2 Score 6   PHQ-9 Total Score 20   If you checked off any problems, how difficult have these problems made it for you to do your work, take care of things at home, or get along with other people? 2      {antidepressants:01072}    Addressed normal pregnancy complaints, reassured and offered suggestions for care  Reviewed gestational age precautions and activity goals/limitations  Nutritional counseling as well as specific

## 2024-04-08 NOTE — PROGRESS NOTES
[facility] MATERNAL FETAL MEDICINE    [address] [place] [address]  P- [phone]  [phone]       MFM Follow-up Visit  Юлия Quintero (1985) is a 38 y.o.  at 27w1d with 2024, by Last Menstrual Period.   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     Patient is scheduled to see Primary OB ([facility]) on 24.    Patients FOB, daughter and cousin are present today.     Interval history since prior appt reviewed and updated as indicated.      No HAs, edema.  Denies preeclamptic symptoms.  Reports good fetal movement.  No bleeding, LOF, cramping, ctxs, or vaginal pressure.   Reports LUQ pain under breast; denies rash, no trauma to area.    Mood evaluated today based on discussion with pt and PHQ screen.       2024    11:45 AM   PHQ-9    Little interest or pleasure in doing things 0   Feeling down, depressed, or hopeless 1   PHQ-2 Score 1   PHQ-9 Total Score 1      Mood Reassuring today    Addressed normal pregnancy complaints, reassured and offered suggestions for care  Reviewed gestational age precautions and activity goals/limitations  Nutritional counseling as well as specific goals based on current maternal and fetal status  Options for GERD, constipation, other common complaints reviewed.   Reviewed gestational age appropriate preventive care regarding communicable disease transmission and vaccines as appropriate (including flu, TDaP >28wk, RSV 32-36wk, and COVID.)  Mood counseling today    Exam:     Vitals:    24 1137   BP: 122/84      Physical Exam  Applicable labs reviewed.   Please see formal ultrasound report under imaging tab.      ASSESSMENT/PLAN:  Patient Active

## 2024-04-12 ENCOUNTER — CARE COORDINATION (OUTPATIENT)
Dept: OTHER | Facility: CLINIC | Age: 39
End: 2024-04-12

## 2024-04-12 NOTE — CARE COORDINATION
Call within 2 business days of discharge: Yes     Patient Current Location: [place]    Last Discharge Facility       Date Complaint Diagnosis Description Type Department Provider    3/12/24 Abdominal Pain  Admission (Discharged) [id number] Rosario MD            Maternity Care Manager contacted the patient by telephone to discuss the maternity management program.  Patient agrees to care management services at this time. Verified name and  with patient as identifiers.     Risk Factors Identified: Depression/Mental Health   PCOS/multip/ama    Needs to be reviewed by the provider   none         Method of communication with provider : none    Advance Care Planning:   Does patient have an Advance Directive:  not on file.     Does patient have OB/Gyn Selected? Yes    Discussed follow up appointments. If no appointment was previously scheduled, appointment scheduling offered: Yes  [facility] follow up appointment(s):   Future Appointments   Date Time Provider Department Center   2024 10:30 AM Cris Flores DO [facility] 1 GVL AMB   2024  2:15 PM Chauncey Villa MD [facility] GVL AMB   2024  3:00 PM Precious Rowland [facility] GVL AMB   2024  1:00 PM [facility] ULTRASOUND 3 [facility] GVL AMB   2024  1:45 PM Hardik Steinberg MD [facility] GVL AMB     Non-[facility] follow up appointment(s): n/a    OB History:   OB History    Para Term  AB Living   4 2 2   1 2   SAB IAB Ectopic Molar Multiple Live Births   1 0       2      # Outcome Date GA Lbr David/2nd Weight Sex Delivery Anes PTL Lv   4 Current            3 SAB 2023           2 Term 08 39w0d  3.572 kg (7 lb 14 oz) F Vag-Spont None N SVITLANA      Birth Comments: Odogwu   1 Term 05/15/06 39w0d  4.054 kg (8 lb 15 oz) M Vag-Spont EPI N SVITLANA      Birth Comments: Kenrick      Obstetric Comments   OB hx:   G1  term  per patient (Dr Carlton) --no records in care everywhere dating back this far.    G2  term  per patient (Dr Carlton) --no records in

## 2024-04-16 ENCOUNTER — CARE COORDINATION (OUTPATIENT)
Dept: OTHER | Facility: CLINIC | Age: 39
End: 2024-04-16

## 2024-04-16 NOTE — CARE COORDINATION
Call within 2 business days of discharge: Yes     Patient Current Location: [place]    Last Discharge Facility       Date Complaint Diagnosis Description Type Department Provider    3/12/24 Abdominal Pain  Admission (Discharged) [id number] Rosario MD     Maternity Care Manager contacted the patient by telephone to discuss the maternity management program.  Patient agrees to care management services at this time. Verified name and  with patient as identifiers.     Risk Factors Identified: Depression/Mental Health       Needs to be reviewed by the provider   none         Method of communication with provider : none    Advance Care Planning:   Does patient have an Advance Directive:  not on file.     Does patient have OB/Gyn Selected? Yes    Discussed follow up appointments. If no appointment was previously scheduled, appointment scheduling offered: Yes  [facility] follow up appointment(s):   Future Appointments   Date Time Provider Department Center   2024 10:30 AM Cris Flores DO [facility] 1 GVL AMB   2024  2:15 PM Chauncey Villa MD [facility] GVL AMB   2024  3:00 PM Precious Rowland [facility] GVL AMB   2024  1:00 PM [facility] ULTRASOUND 3 [facility] GVL AMB   2024  1:45 PM Elsa Jarvis MD [facility] GVL AMB     Non-[facility] follow up appointment(s): n/a    OB History:   OB History    Para Term  AB Living   4 2 2   1 2   SAB IAB Ectopic Molar Multiple Live Births   1 0       2      # Outcome Date GA Lbr David/2nd Weight Sex Delivery Anes PTL Lv   4 Current            3 SAB 2023           2 Term 08 39w0d  3.572 kg (7 lb 14 oz) F Vag-Spont None N SVITLANA      Birth Comments: Odogwu   1 Term 05/15/06 39w0d  4.054 kg (8 lb 15 oz) M Vag-Spont EPI N SVITLANA      Birth Comments: Kenrick      Obstetric Comments   OB hx:   G1  term  per patient (Dr Carlton) --no records in care everywhere dating back this far.    G2  term  per patient (Dr Carlton) --no records in care everywhere dating

## 2024-04-18 ENCOUNTER — HOSPITAL ENCOUNTER (OUTPATIENT)
Age: 39
Discharge: HOME | End: 2024-04-18
Attending: OBSTETRICS & GYNECOLOGY | Admitting: OBSTETRICS & GYNECOLOGY
Payer: MEDICAID

## 2024-04-18 ENCOUNTER — TELEPHONE (OUTPATIENT)
Dept: OBGYN CLINIC | Age: 39
End: 2024-04-18

## 2024-04-18 VITALS
BODY MASS INDEX: 30.91 KG/M2 | DIASTOLIC BLOOD PRESSURE: 62 MMHG | HEART RATE: 96 BPM | OXYGEN SATURATION: 99 % | RESPIRATION RATE: 16 BRPM | TEMPERATURE: 97.2 F | HEIGHT: 62 IN | SYSTOLIC BLOOD PRESSURE: 108 MMHG

## 2024-04-18 PROBLEM — O47.9 FALSE LABOR: Status: ACTIVE | Noted: 2024-04-18

## 2024-04-18 PROBLEM — O47.9 FALSE LABOR: Status: RESOLVED | Noted: 2024-04-18 | Resolved: 2024-04-18

## 2024-04-18 PROCEDURE — 99283 EMERGENCY DEPT VISIT LOW MDM: CPT

## 2024-04-18 NOTE — PROGRESS NOTES
Discharge order received from Dr. Seals. Discharge instructions reviewed with pt. Pt verbalized understanding. Ambulated off floor without difficulty.

## 2024-04-18 NOTE — H&P
Obstetrics History & Physical/OB ED note    Name: Юлия Quintero MRN: [id number]     YOB: 1985  Age: 38 y.o.  Sex: female      Reason for Presentation:  abdominal/pelvic cramping    HPI: Юлия Quintero is a 38 y.o.  female with Estimated Date of Delivery: 24 at 28w4d gestation. Her obstetrical history is significant for  2 previous full-term vaginal deliveries.  Prenatal records reviewed.     Was unable to seen at office today as planned so presented here for evaluation. She mainly has questions about specifics of delivery, delayed cord-clamping etc, which we were able to answer for her. Upon further questioning, she states she does have some occasional cramping and wants to have her cervix checked also.    She reports good fetal movement. She denies vaginal bleeding. She denies leakage of fluid.      Past History:  OB History    Para Term  AB Living   4 2 2   1 2   SAB IAB Ectopic Molar Multiple Live Births   1 0       2      # Outcome Date GA Lbr David/2nd Weight Sex Delivery Anes PTL Lv   4 Current            3 SAB 2023           2 Term 08 39w0d  3.572 kg (7 lb 14 oz) F Vag-Spont None N SVITLANA      Birth Comments: Odogwu   1 Term 05/15/06 39w0d  4.054 kg (8 lb 15 oz) M Vag-Spont EPI N SVITLANA      Birth Comments: Kenrick      Obstetric Comments   OB hx:   G1  term  per patient (Dr Carlton) --no records in care everywhere dating back this far.    G2  term  per patient (Dr Carlton) --no records in care everywhere dating back this far   G3 2023 EAB w/ D&C (pt states she did not have an EAB, removed from pregnancy record)   G4 2023 MAB w/ D&C        x 2 -- pt reports \" no issues\" with either pregnancies.   Denies necessitating IOL with either pregnancies   Denies any hx GDM, GHTN/PreE         Past Medical History:   Diagnosis Date    Abnormal Pap smear     HGSIL    Acne cystica 10/29/2015    Anemia     Anxiety     Asthma     last exacerbation >10yrs.    Substance Use Topics    Alcohol use: Not Currently     Prior to Admission medications    Medication Sig Start Date End Date Taking? Authorizing Provider   Iron Combinations (IRON COMPLEX) CAPS     Bisi Cain MD   Ascorbic Acid (VITAMIN C) 500 MG CAPS     Bisi Cain MD   Misc. Devices KIT Nature's Blend Prenatal Multivitamin with Minerals ([facility] Baby Box)  ND: [address]-73;  Take 1 softgel by mouth daily or as instructed by provider;  #qs for 6 months 3/13/24   Diego Mendez MD   valACYclovir (VALTREX) 500 MG tablet Take 2 tablets by mouth daily    Bisi Cain MD   FLUoxetine (PROZAC) 20 MG capsule Take 1 capsule by mouth daily  Patient not taking: Reported on 4/8/2024 2/7/24   Ivania Jones MD   Cholecalciferol (VITAMIN D) 50 MCG (2000 UT) CAPS capsule Take by mouth    Bisi Cain MD   NONFORMULARY     Bisi Cain MD     Allergies   Allergen Reactions    Fluconazole Hives    Propranolol Rash       Physical Exam:  VITALS:  /62   Pulse 96   Temp 97.2 °F (36.2 °C) (Oral)   Resp 16   Ht 1.575 m (5' 2\")   LMP 10/01/2023   SpO2 99%   BMI 30.91 kg/m²     CONSTITUTIONAL:  awake, alert, cooperative, no apparent distress, and appears stated age  ABDOMEN:  non-tender  FHTs: Baseline: Normal; Variability: Good {> 6 bpm); Accelerations: present; Decelerations: Absent; Reactive NST   Uterine activity/Contractions on monitor: None  Membranes: intact  Cervix: closed/thick/high    Assessment:  28w4d gestation  No signs of labor;   Reassuring fetal status    Plan: Discharge home, follow-up at next OB appointment

## 2024-04-18 NOTE — DISCHARGE INSTRUCTIONS
you take.  How can you care for yourself at home?  Make sure you go to your prenatal appointments. At each visit, your doctor will check your blood pressure and weight. Your doctor will also listen for a fetal heartbeat and measure the size of the uterus.  Drink plenty of fluids. Dehydration can cause contractions. If you have kidney, heart, or liver disease and have to limit fluids, talk with your doctor before you increase the amount of fluids you drink.  Tell your doctor right away if you notice any symptoms of an infection, such as:  Burning when you urinate.  A frequent need to urinate without being able to pass much urine.  A foul-smelling discharge from your vagina.  Vaginal itching.  Unexplained fever.  Unusual pain or soreness in your uterus or lower belly.  Avoid foods that may be harmful.  Don't eat raw meat, deli meat, raw seafood, or raw eggs.  Avoid soft cheese and unpasteurized dairy, like Brie and blue cheese.  Avoid fish that are high in mercury. These include shark, swordfish, topher mackerel, marlin, orange roughy, and bigeye tuna, as well as tilefish from the [facility].  If you smoke or vape, quit or cut back as much as you can. Talk to your doctor if you need help quitting.  If you use alcohol, marijuana, or other drugs, quit or cut back as much as you can. It's safest not to use them at all. Talk to your doctor if you need help quitting.  Follow your doctor's directions about activity. Your doctor will let you know how much exercise you can do.  Ask your doctor if you can have sex. If you are at risk for early labor, your doctor may ask you to not have sex.  Take care to avoid falling. Changes in your body during pregnancy, such as a growing belly, can make you more likely to fall. Sports such as bicycling, skiing, or in-line skating can increase your risk.  Avoid risky activities like horseback or motorcycle riding, water-skiing, scuba diving, and exercising at a high altitude (above 6,000  feet). If you live in a place with a high altitude, talk to your doctor about how you can exercise safely.  Avoid things that can make your body too hot and may be harmful to your pregnancy, such as a hot tub or sauna. Or talk with your doctor before doing anything that raises your body temperature. Your doctor can tell you if it's safe.  Do not take any over-the-counter or herbal medicines or supplements without talking to your doctor or pharmacist first.  When should you call for help?   Call 911  anytime you think you may need emergency care. For example, call if:    You passed out (lost consciousness).     You have a seizure.     You have severe vaginal bleeding. This means that you are soaking through your usual pads or tampons every hour for 2 or more hours.     You have severe pain in your belly or pelvis.     You have had fluid gushing or leaking from your vagina and you know or think the umbilical cord is bulging into your vagina. If this happens, immediately get down on your knees so your rear end (buttocks) is higher than your head. This will decrease the pressure on the cord until help arrives.   Call your doctor now or seek immediate medical care if:    You have signs of preeclampsia, such as:  Sudden swelling of your face, hands, or feet.  New vision problems (such as dimness, blurring, or seeing spots).  A severe headache.     You have symptoms of a blood clot in your leg (called a deep vein thrombosis), such as:  Pain in the calf, back of the knee, thigh, or groin.  Swelling in the leg or groin.  A color change on the leg or groin. The skin may be reddish or purplish, depending on your usual skin color.     You have any vaginal bleeding.     You have belly pain or cramping.     You have a fever.     You've been having regular contractions for an hour. This means that you've had at least 6 contractions within 1 hour, even after you change your position and drink fluids.     You have symptoms of a

## 2024-04-18 NOTE — TELEPHONE ENCOUNTER
Pt LVM stating that she went to the ER has instructed by Dr. Flores. She is wondering when she will need to schedule her next OB visit.    Forwarded to OB Coordinator, Karla, since she spoke with pt this morning and sent her to ER.

## 2024-04-18 NOTE — TELEPHONE ENCOUNTER
Return called to patient regarding the appointment scheduled, and that MD had suggested that she be seen at OB triage and MD has asked for her appointment to be cancelled for today. Patient extremely upset and says that she will not go. Patient states that \"this office is making this pregnancy too difficult for me.\" Patient states \"I would know to go if I needed to go.\" Patient eventually verbalized that she would go to triage to be evaluated.

## 2024-04-18 NOTE — PROGRESS NOTES
Pt presents to [facility]. She states that she missed her appointment and that the \"office wouldn't see me until I was seen here.\" Pt reports some cramps. Pt denies vag bleeding and/or LOF. SVE performed by Dr. Seals: cl/th/hi. EFM and TOCO placed on soft nontender abd.

## 2024-04-23 NOTE — PROGRESS NOTES
most likely related to large cavernous hemangioma. Recommend multiphasic hepatic CT or MRI for further characterization.   *I do not see a FU MRI that has been performed     Avoid estrogen contracep postpartumtives     LFTs today__    Mixed hyperlipidemia 11/01/2022    Other ill-defined conditions(799.89)     chronic acne    PCOS (polycystic ovarian syndrome)     Pruritic disorder 12/29/2023    PTSD (post-traumatic stress disorder)     Seasonal affective disorder (HCC)     Shingles     2 weeks ago    Subclinical hypothyroidism 11/01/2022    Tetrahydrocannabinol (THC) use disorder, mild, abuse     Thyroid disease     hypothyroid. stable w/o med since 6/2012    Vasovagal attack 02/11/2024      Past Surgical History:   Procedure Laterality Date    DILATION AND CURETTAGE OF UTERUS  06/01/2023    x1 per patient    LEEP  2013    Cervical dysplasia-LEEP       Family History   Problem Relation Age of Onset    Diabetes Paternal Grandmother     Diabetes Maternal Grandmother     Asthma Mother     Migraines Mother     Diabetes Mother     Anxiety Disorder Mother     Mental Illness Mother     Asthma Brother     Breast Cancer Maternal Aunt     Cancer Maternal Uncle         lung    Breast Cancer Maternal Cousin         late 20's    Breast Cancer Other         maternal great aunt    Ovarian Cancer Neg Hx     Colon Cancer Neg Hx       Social History     Tobacco Use    Smoking status: Never    Smokeless tobacco: Never   Substance Use Topics    Alcohol use: Not Currently      Allergies   Allergen Reactions    Fluconazole Hives    Propranolol Rash         ROS:  No obvious pertinent positives on review of systems except for what was outlined above.       Objective:       /74   Pulse 83   Ht 1.575 m (5' 2\")   Wt 80.2 kg (176 lb 12.8 oz)   LMP 10/01/2023   BMI 32.34 kg/m²     BP Readings from Last 3 Encounters:   04/25/24 118/74   04/18/24 108/62   04/08/24 122/84       Wt Readings from Last 3 Encounters:   04/25/24 80.2 kg (176

## 2024-04-25 ENCOUNTER — INITIAL CONSULT (OUTPATIENT)
Age: 39
End: 2024-04-25
Payer: MEDICAID

## 2024-04-25 VITALS
WEIGHT: 176.8 LBS | HEIGHT: 62 IN | BODY MASS INDEX: 32.54 KG/M2 | HEART RATE: 83 BPM | SYSTOLIC BLOOD PRESSURE: 118 MMHG | DIASTOLIC BLOOD PRESSURE: 74 MMHG

## 2024-04-25 DIAGNOSIS — R07.89 ATYPICAL CHEST PAIN: ICD-10-CM

## 2024-04-25 DIAGNOSIS — R06.00 DYSPNEA, UNSPECIFIED TYPE: Primary | ICD-10-CM

## 2024-04-25 DIAGNOSIS — R00.2 PALPITATIONS: ICD-10-CM

## 2024-04-25 PROCEDURE — 99204 OFFICE O/P NEW MOD 45 MIN: CPT | Performed by: INTERNAL MEDICINE

## 2024-04-30 ENCOUNTER — ROUTINE PRENATAL (OUTPATIENT)
Dept: OBGYN CLINIC | Age: 39
End: 2024-04-30
Payer: MEDICAID

## 2024-04-30 VITALS — DIASTOLIC BLOOD PRESSURE: 68 MMHG | BODY MASS INDEX: 32.37 KG/M2 | WEIGHT: 177 LBS | SYSTOLIC BLOOD PRESSURE: 110 MMHG

## 2024-04-30 DIAGNOSIS — Z98.890 HISTORY OF LOOP ELECTROSURGICAL EXCISION PROCEDURE (LEEP) OF CERVIX AFFECTING PREGNANCY IN SECOND TRIMESTER: ICD-10-CM

## 2024-04-30 DIAGNOSIS — R30.0 DYSURIA: ICD-10-CM

## 2024-04-30 DIAGNOSIS — Z86.39 HISTORY OF HYPOTHYROIDISM: ICD-10-CM

## 2024-04-30 DIAGNOSIS — A60.09 GENITAL HERPES SIMPLEX VIRUS (HSV) INFECTION IN MOTHER AFFECTING PREGNANCY: Primary | ICD-10-CM

## 2024-04-30 DIAGNOSIS — O34.42 HISTORY OF LOOP ELECTROSURGICAL EXCISION PROCEDURE (LEEP) OF CERVIX AFFECTING PREGNANCY IN SECOND TRIMESTER: ICD-10-CM

## 2024-04-30 DIAGNOSIS — O98.319 GENITAL HERPES SIMPLEX VIRUS (HSV) INFECTION IN MOTHER AFFECTING PREGNANCY: Primary | ICD-10-CM

## 2024-04-30 DIAGNOSIS — O99.512 ASTHMA AFFECTING PREGNANCY IN SECOND TRIMESTER: ICD-10-CM

## 2024-04-30 DIAGNOSIS — O99.342 ANXIETY DURING PREGNANCY, ANTEPARTUM, SECOND TRIMESTER: ICD-10-CM

## 2024-04-30 DIAGNOSIS — R00.2 PALPITATIONS: ICD-10-CM

## 2024-04-30 DIAGNOSIS — R45.851 DEPRESSION WITH SUICIDAL IDEATION: ICD-10-CM

## 2024-04-30 DIAGNOSIS — F12.90 MARIJUANA USE DURING PREGNANCY: ICD-10-CM

## 2024-04-30 DIAGNOSIS — J45.909 ASTHMA AFFECTING PREGNANCY IN SECOND TRIMESTER: ICD-10-CM

## 2024-04-30 DIAGNOSIS — Z87.42 HISTORY OF POLYCYSTIC OVARIAN SYNDROME: ICD-10-CM

## 2024-04-30 DIAGNOSIS — F32.A DEPRESSION WITH SUICIDAL IDEATION: ICD-10-CM

## 2024-04-30 DIAGNOSIS — Z72.89 DELIBERATE SELF-CUTTING: ICD-10-CM

## 2024-04-30 DIAGNOSIS — F41.9 ANXIETY DURING PREGNANCY, ANTEPARTUM, SECOND TRIMESTER: ICD-10-CM

## 2024-04-30 DIAGNOSIS — O09.522 HIGH RISK PREGNANCY, MULTIGRAVIDA OF ADVANCED MATERNAL AGE IN SECOND TRIMESTER: ICD-10-CM

## 2024-04-30 DIAGNOSIS — O10.919 CHRONIC HYPERTENSION AFFECTING PREGNANCY: ICD-10-CM

## 2024-04-30 DIAGNOSIS — O99.320 MARIJUANA USE DURING PREGNANCY: ICD-10-CM

## 2024-04-30 PROCEDURE — 99213 OFFICE O/P EST LOW 20 MIN: CPT | Performed by: OBSTETRICS & GYNECOLOGY

## 2024-04-30 NOTE — PROGRESS NOTES
Юлия  presents for EMILY. . 30w2d.    PL and any MFM notes reviewed.. Med list reviewed.  Taking Prenatal Vitamins: Yes  She is noticing:  sensations in hands when urinating -- CCUA obtained, culture sent    The following were addressed today:  Problem List          High    High risk pregnancy, multigravida of advanced maternal age in second trimester       Medium    Anxiety during pregnancy, antepartum, second trimester    Chronic hypertension affecting pregnancy       Unprioritized    Genital herpes simplex virus (HSV) infection in mother affecting pregnancy - Primary    History of loop electrosurgical excision procedure (LEEP) of cervix affecting pregnancy in second trimester    Marijuana use during pregnancy    History of polycystic ovarian syndrome    Asthma affecting pregnancy in second trimester    History of hypothyroidism    Deliberate self-cutting    Depression with suicidal ideation    Relevant Medications    FLUoxetine (PROZAC) 20 MG capsule    Palpitations

## 2024-05-03 LAB
BACTERIA SPEC CULT: NORMAL
SERVICE CMNT-IMP: NORMAL

## 2024-05-07 ENCOUNTER — TELEPHONE (OUTPATIENT)
Age: 39
End: 2024-05-07

## 2024-05-07 NOTE — TELEPHONE ENCOUNTER
----- Message from Chauncey Villa MD sent at 5/7/2024 12:59 PM EDT -----  Please let the patient know that the patient was predominantly in sinus rhythm.  The patient had rare ectopy.  No new changes to medical therapy at this time.

## 2024-05-14 ENCOUNTER — ROUTINE PRENATAL (OUTPATIENT)
Dept: OBGYN CLINIC | Age: 39
End: 2024-05-14
Payer: MEDICAID

## 2024-05-14 VITALS — BODY MASS INDEX: 32.92 KG/M2 | DIASTOLIC BLOOD PRESSURE: 68 MMHG | SYSTOLIC BLOOD PRESSURE: 118 MMHG | WEIGHT: 180 LBS

## 2024-05-14 DIAGNOSIS — Z3A.32 32 WEEKS GESTATION OF PREGNANCY: ICD-10-CM

## 2024-05-14 DIAGNOSIS — Z91.89 AT RISK FOR CANCER: ICD-10-CM

## 2024-05-14 DIAGNOSIS — A60.09 GENITAL HERPES SIMPLEX VIRUS (HSV) INFECTION IN MOTHER AFFECTING PREGNANCY: ICD-10-CM

## 2024-05-14 DIAGNOSIS — O09.523 HIGH RISK PREGNANCY, MULTIGRAVIDA OF ADVANCED MATERNAL AGE IN THIRD TRIMESTER: Primary | ICD-10-CM

## 2024-05-14 DIAGNOSIS — O10.919 CHRONIC HYPERTENSION AFFECTING PREGNANCY: ICD-10-CM

## 2024-05-14 DIAGNOSIS — F41.9 ANXIETY DURING PREGNANCY, ANTEPARTUM, SECOND TRIMESTER: ICD-10-CM

## 2024-05-14 DIAGNOSIS — O99.320 MARIJUANA USE DURING PREGNANCY: ICD-10-CM

## 2024-05-14 DIAGNOSIS — O34.42 HISTORY OF LOOP ELECTROSURGICAL EXCISION PROCEDURE (LEEP) OF CERVIX AFFECTING PREGNANCY IN SECOND TRIMESTER: ICD-10-CM

## 2024-05-14 DIAGNOSIS — R53.82 CHRONIC FATIGUE: ICD-10-CM

## 2024-05-14 DIAGNOSIS — F12.90 MARIJUANA USE DURING PREGNANCY: ICD-10-CM

## 2024-05-14 DIAGNOSIS — O99.342 ANXIETY DURING PREGNANCY, ANTEPARTUM, SECOND TRIMESTER: ICD-10-CM

## 2024-05-14 DIAGNOSIS — Z86.39 HISTORY OF HYPOTHYROIDISM: ICD-10-CM

## 2024-05-14 DIAGNOSIS — Z98.890 HISTORY OF LOOP ELECTROSURGICAL EXCISION PROCEDURE (LEEP) OF CERVIX AFFECTING PREGNANCY IN SECOND TRIMESTER: ICD-10-CM

## 2024-05-14 DIAGNOSIS — O47.03 PRETERM UTERINE CONTRACTIONS IN THIRD TRIMESTER, ANTEPARTUM: ICD-10-CM

## 2024-05-14 DIAGNOSIS — O98.319 GENITAL HERPES SIMPLEX VIRUS (HSV) INFECTION IN MOTHER AFFECTING PREGNANCY: ICD-10-CM

## 2024-05-14 LAB
ERYTHROCYTE [DISTWIDTH] IN BLOOD BY AUTOMATED COUNT: 14.5 % (ref 11.9–14.6)
HCT VFR BLD AUTO: 36.2 % (ref 35.8–46.3)
HGB BLD-MCNC: 11 G/DL (ref 11.7–15.4)
MCH RBC QN AUTO: 28.1 PG (ref 26.1–32.9)
MCHC RBC AUTO-ENTMCNC: 30.4 G/DL (ref 31.4–35)
MCV RBC AUTO: 92.3 FL (ref 82–102)
NRBC # BLD: 0 K/UL (ref 0–0.2)
PLATELET # BLD AUTO: 187 K/UL (ref 150–450)
PMV BLD AUTO: 11.9 FL (ref 9.4–12.3)
RBC # BLD AUTO: 3.92 M/UL (ref 4.05–5.2)
T4 FREE SERPL-MCNC: 0.7 NG/DL (ref 0.9–1.7)
TSH W FREE THYROID IF ABNORMAL: 0.51 UIU/ML (ref 0.27–4.2)
WBC # BLD AUTO: 7.2 K/UL (ref 4.3–11.1)

## 2024-05-14 PROCEDURE — 99213 OFFICE O/P EST LOW 20 MIN: CPT | Performed by: OBSTETRICS & GYNECOLOGY

## 2024-05-14 PROCEDURE — 59025 FETAL NON-STRESS TEST: CPT | Performed by: OBSTETRICS & GYNECOLOGY

## 2024-05-14 NOTE — PROGRESS NOTES
Юлия  presents for EMILY. . 32w2d.    PL and any MFM notes reviewed.. Med list reviewed.  Taking Prenatal Vitamins: Yes  She is noticing:  Irregular contractions , never more than 3-4/hour. Better with rest  and increased fatigue. Hx low thyroid. Was taken off thyroid med 2yrs ago. Will ck tsh and cbc today at pt req  Hx leep  No hx PTD  Takes valtrex daily  On no bp med- dx chtn  is in the chart  She had + barbituates in uds 2-14. She says she had gone to hospital and been given pain meds    Indication: c/o ctx's  Time on: 34min  Baseline: initially 130, then 150. Baby moving and kicking vigorously the whole time. Made it difficult to ascertain baseline  Reactive within 20min? yes  Decels? 2-3 variable decels  Contractions: none      Pt had Qs today  Will circ get done for her baby.? This is completely the purview of the pediatrician. If [facility] peds [facility] not round [facility] there is a group there who will see the baby.   Can she have a tubal? We are not allowed to do tubals. We can do RROS, IF pt is having sx for another reason. C/s is not planned as she has had Vds before. I can work on ethics approval for this in case she has a c/s if she'd like. It means she can never get pregnant again. She says she understand this. She may be approved, given her bmi is 32. Also fam hx breast ca. Asked staff to get her to sign tiago papers.  Doesn't want anyone mentioning hsv hx with people in room. Of course we are careful to not divulge private info. But we are there to accomplish medical work. So any time we enter a room we need to be able to do this work, including asking appropriate Qs. Advised pt that any time a physician comes into an exam room, we need to be able to have open and forthright convos with the pt. We do not discuss info with other people, but we do have to d/w the pt herself. So she needs to send unwanted visitors out of the room if their presence hinder her ability to have open d/w us.   How much more wt

## 2024-05-20 ENCOUNTER — TELEPHONE (OUTPATIENT)
Dept: OBGYN CLINIC | Age: 39
End: 2024-05-20

## 2024-05-20 ENCOUNTER — ROUTINE PRENATAL (OUTPATIENT)
Dept: OBGYN CLINIC | Age: 39
End: 2024-05-20
Payer: MEDICAID

## 2024-05-20 VITALS — SYSTOLIC BLOOD PRESSURE: 120 MMHG | DIASTOLIC BLOOD PRESSURE: 78 MMHG

## 2024-05-20 DIAGNOSIS — R00.2 PALPITATIONS: ICD-10-CM

## 2024-05-20 DIAGNOSIS — A60.09 GENITAL HERPES SIMPLEX VIRUS (HSV) INFECTION IN MOTHER AFFECTING PREGNANCY: ICD-10-CM

## 2024-05-20 DIAGNOSIS — Z86.39 HISTORY OF HYPOTHYROIDISM: Primary | ICD-10-CM

## 2024-05-20 DIAGNOSIS — O09.523 HIGH RISK PREGNANCY, MULTIGRAVIDA OF ADVANCED MATERNAL AGE IN THIRD TRIMESTER: Primary | ICD-10-CM

## 2024-05-20 DIAGNOSIS — F41.9 ANXIETY DURING PREGNANCY IN THIRD TRIMESTER, ANTEPARTUM: ICD-10-CM

## 2024-05-20 DIAGNOSIS — Z3A.33 33 WEEKS GESTATION OF PREGNANCY: ICD-10-CM

## 2024-05-20 DIAGNOSIS — O98.319 GENITAL HERPES SIMPLEX VIRUS (HSV) INFECTION IN MOTHER AFFECTING PREGNANCY: ICD-10-CM

## 2024-05-20 DIAGNOSIS — F32.A DEPRESSION WITH SUICIDAL IDEATION: ICD-10-CM

## 2024-05-20 DIAGNOSIS — O10.919 CHRONIC HYPERTENSION AFFECTING PREGNANCY: ICD-10-CM

## 2024-05-20 DIAGNOSIS — O99.513 ASTHMA AFFECTING PREGNANCY IN THIRD TRIMESTER: ICD-10-CM

## 2024-05-20 DIAGNOSIS — O34.43 HISTORY OF LOOP ELECTROSURGICAL EXCISION PROCEDURE (LEEP) OF CERVIX AFFECTING PREGNANCY IN THIRD TRIMESTER: ICD-10-CM

## 2024-05-20 DIAGNOSIS — R45.851 DEPRESSION WITH SUICIDAL IDEATION: ICD-10-CM

## 2024-05-20 DIAGNOSIS — Z91.89 AT RISK FOR CANCER: ICD-10-CM

## 2024-05-20 DIAGNOSIS — Z86.39 HISTORY OF HYPOTHYROIDISM: ICD-10-CM

## 2024-05-20 DIAGNOSIS — O36.5930 POOR FETAL GROWTH AFFECTING MANAGEMENT OF MOTHER IN THIRD TRIMESTER, SINGLE OR UNSPECIFIED FETUS: ICD-10-CM

## 2024-05-20 DIAGNOSIS — O99.343 ANXIETY DURING PREGNANCY IN THIRD TRIMESTER, ANTEPARTUM: ICD-10-CM

## 2024-05-20 DIAGNOSIS — Z72.89 DELIBERATE SELF-CUTTING: ICD-10-CM

## 2024-05-20 DIAGNOSIS — O99.320 MARIJUANA USE DURING PREGNANCY: ICD-10-CM

## 2024-05-20 DIAGNOSIS — Z87.42 HISTORY OF POLYCYSTIC OVARIAN SYNDROME: ICD-10-CM

## 2024-05-20 DIAGNOSIS — F12.90 MARIJUANA USE DURING PREGNANCY: ICD-10-CM

## 2024-05-20 DIAGNOSIS — Z98.890 HISTORY OF LOOP ELECTROSURGICAL EXCISION PROCEDURE (LEEP) OF CERVIX AFFECTING PREGNANCY IN THIRD TRIMESTER: ICD-10-CM

## 2024-05-20 DIAGNOSIS — J45.909 ASTHMA AFFECTING PREGNANCY IN THIRD TRIMESTER: ICD-10-CM

## 2024-05-20 PROCEDURE — 76819 FETAL BIOPHYS PROFIL W/O NST: CPT | Performed by: OBSTETRICS & GYNECOLOGY

## 2024-05-20 PROCEDURE — 99214 OFFICE O/P EST MOD 30 MIN: CPT | Performed by: OBSTETRICS & GYNECOLOGY

## 2024-05-20 PROCEDURE — 76820 UMBILICAL ARTERY ECHO: CPT | Performed by: OBSTETRICS & GYNECOLOGY

## 2024-05-20 PROCEDURE — 76816 OB US FOLLOW-UP PER FETUS: CPT | Performed by: OBSTETRICS & GYNECOLOGY

## 2024-05-20 ASSESSMENT — PATIENT HEALTH QUESTIONNAIRE - PHQ9
1. LITTLE INTEREST OR PLEASURE IN DOING THINGS: NOT AT ALL
SUM OF ALL RESPONSES TO PHQ QUESTIONS 1-9: 1
SUM OF ALL RESPONSES TO PHQ QUESTIONS 1-9: 1
2. FEELING DOWN, DEPRESSED OR HOPELESS: SEVERAL DAYS
SUM OF ALL RESPONSES TO PHQ QUESTIONS 1-9: 1
SUM OF ALL RESPONSES TO PHQ9 QUESTIONS 1 & 2: 1
SUM OF ALL RESPONSES TO PHQ QUESTIONS 1-9: 1

## 2024-05-20 NOTE — TELEPHONE ENCOUNTER
Pt called the office returning my call. I relayed results and next steps. Pt voiced undestanding and will go to the [facility] site next Tuesday, 5/28, to have tsha nd free t4 checked.    Orders have been placed.

## 2024-05-20 NOTE — PROGRESS NOTES
of fetal growth restriction can be broadly categorized into maternal, fetal, and placental. Although the primary pathophysiologic mechanisms underlying these conditions are different, they often (but not always) have the same final common pathway: suboptimal uterine-placental perfusion and fetal nutrition. Suboptimal perfusion of placenta may be etiology in 30-40% of these cases, while in about 20% there are genetic disorder or congenital malformations as cause.   Many of the underlying conditions are not modifiable with the exception of tobacco use which increases risk by 3.5x. Use of other substances (alcohol, cocaine, narcotics) will also increase risk of suboptimal growth. Also modifiable are factors associated with energy imbalance due to poor maternal nutritional intake or excessive physical activity.     Symmetric growth restriction comprises 20 to 30 percent of IUGR pregnancies and refers to a growth pattern in which all fetal organs are decreased proportionally due to global impairment of early fetal cellular hyperplasia versus constitutional small size.     Asymmetric growth restriction is characterized by a relatively greater decrease in abdominal size (eg, liver volume and subcutaneous fat tissue) than head circumference and is found in the remaining 70 to 80 percent of the FGR population. Asymmetric fetal growth is thought to result from the capacity of the fetus to adapt to a hostile environment by redistributing blood flow in favor of vital organs (eg, brain, heart, placenta) at the expense of nonvital fetal organs (eg, abdominal viscera, lungs, skin, kidneys). These fetuses are at an increased risk for  hypoglycemia as hepatic glycogen stores are compromised. Greatest concern of pathologic issues when AC is <3%. Asymmetric growth restriction may progress to symmetric growth restriction.     Fetal growth restriction increases risk of IUFD, as well as,  mortality and morbidity. In

## 2024-05-20 NOTE — TELEPHONE ENCOUNTER
Called pt to address concerns regarding today's visit. Questions answered. Pt agree's and voiced understanding.

## 2024-05-20 NOTE — ASSESSMENT & PLAN NOTE
Denies recent asthma s/sx.  
Patient has Chronic HTN. BP stable today.    Patient is currently not taking medications for blood pressure control. .   Plan delivery for chronic hypertension not on meds is in 38-39 weeks, all timing adjusted based on maternal and fetal condition.    Recommend adding or changing medication prn to keep SBP < 140, DBP < 90.   Low dose Aspirin (162 mg daily) daily for the prevention of preeclampsia in high risk women.  Needs  testing at least weekly, even though no current HTN  Close monitoring of BP through 2 weeks postpartum to maintain <140/90.  Recommend in office BP check day 5-7 after delivery.   Recommend 20mg PO lasix x 5 days beginning PPD #1.     Counseling re risks of worsening in pregnancy and postpartum reviewed, patient should monitor BP through 6 weeks postpartum.     
intake and decrease energy output. (Protein handout given on 24)  Repeat US for fetal growth in 2-3 weeks.  If AC <10%, weekly testing beginning at 34wk and delivery in 39th week as long as no additional risk factors are noted.     Delivery timing is contingent upon the growth trajectory and  testing results.  General recommendations are summarized below:    - Non-severe FGR with normal UA Doppler: 38-39 weeks    - Severe FGR (EFW <3%): 37 weeks    - UA decreased end-diastolic flow: 37 weeks    - UA absent end-diastolic flow: 33-34 weeks    - UA reversed end-diastolic flow: 30-32 weeks     - Oligohydramnios, preeclampsia, medical comorbidities: individualized

## 2024-05-20 NOTE — PATIENT INSTRUCTIONS
Resources for Depression/Anxiety  Postpartum Support International (PSI).    PSI Warmline:  6-422-891-4PPD (8915).  WWW.[email or web address].NET    Mom's IMPACTT  [email or web address]

## 2024-05-20 NOTE — TELEPHONE ENCOUNTER
----- Message from Юлия Quintero sent at 5/20/2024  3:18 PM EDT -----  Regarding: Induce   Contact: [phone]  I have some questions that came to mind about the growth of the baby please contact me thanks

## 2024-05-20 NOTE — TELEPHONE ENCOUNTER
LVM for pt to call back to discuss results.    ----- Message from Isa Krishnan MD sent at 5/20/2024  8:49 AM EDT -----  Pregnant. Not on thyroid med. Hx low thyroid  This needs to be repeat in couple wks- in the morning, fasting. Repeat tsh, free t4

## 2024-05-29 ENCOUNTER — ROUTINE PRENATAL (OUTPATIENT)
Dept: OBGYN CLINIC | Age: 39
End: 2024-05-29

## 2024-05-29 VITALS — BODY MASS INDEX: 33.11 KG/M2 | SYSTOLIC BLOOD PRESSURE: 118 MMHG | DIASTOLIC BLOOD PRESSURE: 78 MMHG | WEIGHT: 181 LBS

## 2024-05-29 DIAGNOSIS — Z86.39 HISTORY OF HYPOTHYROIDISM: ICD-10-CM

## 2024-05-29 DIAGNOSIS — F41.9 ANXIETY DURING PREGNANCY IN THIRD TRIMESTER, ANTEPARTUM: ICD-10-CM

## 2024-05-29 DIAGNOSIS — Z72.89 DELIBERATE SELF-CUTTING: ICD-10-CM

## 2024-05-29 DIAGNOSIS — O10.919 CHRONIC HYPERTENSION AFFECTING PREGNANCY: ICD-10-CM

## 2024-05-29 DIAGNOSIS — O09.523 HIGH RISK PREGNANCY, MULTIGRAVIDA OF ADVANCED MATERNAL AGE IN THIRD TRIMESTER: Primary | ICD-10-CM

## 2024-05-29 DIAGNOSIS — F12.90 MARIJUANA USE DURING PREGNANCY: ICD-10-CM

## 2024-05-29 DIAGNOSIS — O99.320 MARIJUANA USE DURING PREGNANCY: ICD-10-CM

## 2024-05-29 DIAGNOSIS — Z80.3 FAMILY HISTORY OF BREAST CANCER: ICD-10-CM

## 2024-05-29 DIAGNOSIS — Z98.890 HISTORY OF LOOP ELECTROSURGICAL EXCISION PROCEDURE (LEEP) OF CERVIX AFFECTING PREGNANCY IN THIRD TRIMESTER: ICD-10-CM

## 2024-05-29 DIAGNOSIS — Z87.42 HISTORY OF POLYCYSTIC OVARIAN SYNDROME: ICD-10-CM

## 2024-05-29 DIAGNOSIS — F32.A DEPRESSION WITH SUICIDAL IDEATION: ICD-10-CM

## 2024-05-29 DIAGNOSIS — O36.5930 POOR FETAL GROWTH AFFECTING MANAGEMENT OF MOTHER IN THIRD TRIMESTER, SINGLE OR UNSPECIFIED FETUS: ICD-10-CM

## 2024-05-29 DIAGNOSIS — Z91.89 AT RISK FOR CANCER: ICD-10-CM

## 2024-05-29 DIAGNOSIS — O98.319 GENITAL HERPES SIMPLEX VIRUS (HSV) INFECTION IN MOTHER AFFECTING PREGNANCY: ICD-10-CM

## 2024-05-29 DIAGNOSIS — O34.43 HISTORY OF LOOP ELECTROSURGICAL EXCISION PROCEDURE (LEEP) OF CERVIX AFFECTING PREGNANCY IN THIRD TRIMESTER: ICD-10-CM

## 2024-05-29 DIAGNOSIS — R00.2 PALPITATIONS: ICD-10-CM

## 2024-05-29 DIAGNOSIS — J45.909 ASTHMA AFFECTING PREGNANCY IN THIRD TRIMESTER: ICD-10-CM

## 2024-05-29 DIAGNOSIS — A60.09 GENITAL HERPES SIMPLEX VIRUS (HSV) INFECTION IN MOTHER AFFECTING PREGNANCY: ICD-10-CM

## 2024-05-29 DIAGNOSIS — Z3A.34 34 WEEKS GESTATION OF PREGNANCY: ICD-10-CM

## 2024-05-29 DIAGNOSIS — O99.513 ASTHMA AFFECTING PREGNANCY IN THIRD TRIMESTER: ICD-10-CM

## 2024-05-29 DIAGNOSIS — O99.343 ANXIETY DURING PREGNANCY IN THIRD TRIMESTER, ANTEPARTUM: ICD-10-CM

## 2024-05-29 DIAGNOSIS — R45.851 DEPRESSION WITH SUICIDAL IDEATION: ICD-10-CM

## 2024-05-29 NOTE — PATIENT INSTRUCTIONS
PTL/labor precautions, FMC, and pregnancy warning signs reviewed. Pt advised to call the office at [phone] or go straight to Labor and Delivery at [facility] with any of the following concerns vaginal bleeding, leaking of fluid, denis regularly Q 5-7 minutes for over an hour or not feeling the baby move.     Kick counts and pre-term labor precautions reviewed

## 2024-05-29 NOTE — PROGRESS NOTES
OB return  Юлия Quintero is a 39 y.o. female   with 34w3d IUP with Estimated Date of Delivery: 24 presenting to the clinic as a routine OB and NST.     NST: today    Indications: CHTN, Poor fetal growth  Time: 33 minutes   Results: Reactive   FHR Baseline Rate: 140bpm  Periodic Changes: Mod variability, 15x15 accels seen, no decels noted. 1 uc noted on toco.  Comments: Overall reassuring fetal status  Repeat:  As clinically indicated     She denies feeling regular contractions, leakage of fluid or vaginal bleeding and reports active fetal movement.     She denies HA, blurred vision or RUQ pain.     Problem list reviewed and updated    Pregnancy complicated by:  1. CHTN- not on bp meds, on baby ASA  2. Hx of liver hemangioma- seen on imaging in , avoid estrogen, baseline LFT  3. Hx of LEEP in , after 2 , MFM referral, CL screening  4. AMA- 38 yr old, low risk NIPT, normal anatomy. 24 [facility]: Reassuring fetal status. Growth today lagging AC; KATINA- WNL, BPP- . For full details review formal ultrasound report. Sees mfm 24.   5. 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 GORDY, hx of depression, hx of panic attacks, hx of pp depression, hx of SI- hx of lexapro- prn vistaril, see note from Dr. Jones with details, recommend psychiatrist, information to Impact[facility] and Itrust placed,  consult in place, started on prozac 20mg daily, offered transfer to [facility] due to addition resources, seeing [facility]. Continues taking Prozac daily.   7. Asthma- inhaler prn  8. Hx of hypothryoid- not on meds. TSH wnl collected on 24.   9. Hx of THC- +UDS 2023, cord segment at delivery  10. PCOS: HgbA1C: 5.0    Physical Examination:  /78   Wt 82.1 kg (181 lb)   LMP 10/01/2023   BMI 33.11 kg/m²    Gen: AAOx3  Ab: soft, NTTP, gravid uterus  Skin: no edema    Plan:  --Reactive NST discussed with patient and overall fetal reassurance.   --Patient states did have TFTs collected this morning and

## 2024-05-30 LAB
T4 FREE SERPL-MCNC: 0.7 NG/DL (ref 0.9–1.7)
TSH W FREE THYROID IF ABNORMAL: 1.3 UIU/ML (ref 0.27–4.2)

## 2024-06-01 ENCOUNTER — HOSPITAL ENCOUNTER (OUTPATIENT)
Age: 39
Discharge: HOME | End: 2024-06-01
Attending: OBSTETRICS & GYNECOLOGY | Admitting: OBSTETRICS & GYNECOLOGY
Payer: MEDICAID

## 2024-06-01 VITALS
HEART RATE: 87 BPM | OXYGEN SATURATION: 99 % | SYSTOLIC BLOOD PRESSURE: 108 MMHG | TEMPERATURE: 98.1 F | DIASTOLIC BLOOD PRESSURE: 74 MMHG | RESPIRATION RATE: 18 BRPM

## 2024-06-01 PROCEDURE — 99283 EMERGENCY DEPT VISIT LOW MDM: CPT

## 2024-06-01 PROCEDURE — 59025 FETAL NON-STRESS TEST: CPT

## 2024-06-02 NOTE — PROGRESS NOTES
Patient given discharge instructions at this time. Instructed to return to [facility] with complaints of LOF, VB, DFM and or regular painful contractions. Follow up with regular scheduled OB visit.

## 2024-06-02 NOTE — DISCHARGE INSTRUCTIONS
Weeks 34 to 36 of Your Pregnancy: Care Instructions  Your belly has grown quite large. It's almost time to give birth! Your baby's lungs are almost ready to breathe air. The skull bones are firm enough to protect your baby's head. But they're soft enough to move down through the birth canal.    You might be wondering what to expect during labor. Because each birth is different, there's no way to know exactly what childbirth will be like for you. Talk to your doctor or midwife about any concerns you have.    You'll probably have a test for group B streptococcus (GBS). GBS is bacteria that can live in the vagina and rectum. GBS can make your baby sick after birth. If you test positive, you'll get antibiotics during labor.    Choose what type of pain relief you would like during labor.  You can choose from a few types, including medicine and non-medicine options. You may want to use several types of pain relief.     Know how medicines can help with pain during labor.  Some medicines lower anxiety and help with some of the pain. Others make your lower body numb so that you will feel less pain.     Tell your doctor about your pain medicine choice.  Do this before you start labor or very early in your labor. You may be able to change your mind during labor.     Learn about the stages of labor.    The first stage includes the early (latent) and active phases of labor. Contractions start in early labor. During active labor, contractions get stronger, last longer, and happen more often. And the cervix opens more rapidly.  The second stage starts when you're ready to push. During this stage, your baby is born.  During the third stage, you'll usually have a few more contractions to push out the placenta.   Follow-up care is a key part of your treatment and safety. Be sure to make and go to all appointments, and call your doctor if you are having problems. It's also a good idea to know your test results and keep a list of

## 2024-06-02 NOTE — PROGRESS NOTES
Patient presents to triage with DFM. States that she has felt little to no movement today. Patient also states that she has a HA. Denies VB, contractions and LOF. Vitals wdl. US and toco placed on soft, non-tender abdomen at this time.

## 2024-06-02 NOTE — PROGRESS NOTES
Patient denies wanting Tylenol at this time. States that she feels the baby moving around at this time.

## 2024-06-03 ENCOUNTER — CARE COORDINATION (OUTPATIENT)
Dept: OTHER | Facility: CLINIC | Age: 39
End: 2024-06-03

## 2024-06-03 NOTE — CARE COORDINATION
Ambulatory Care Coordination Note    ACM attempted 2nd outreach to patient for introduction to Associate Care Management related to Maternity CM. HIPAA compliant message left requesting a return phone call at patient convenience.     Will continue to outreach.      Future Appointments   Date Time Provider Department Center   6/5/2024  7:45 AM [facility] PRIMARY [facility] AMB   6/5/2024  8:15 AM Lucita Land, APRN - NP [facility] AMB   6/12/2024  3:30 PM [facility] PRIMARY [facility] AMB   6/12/2024  4:00 PM Isa Krishnan MD [facility] AMB   6/17/2024 11:00 AM [facility] ULTRASOUND 1 [facility] AMB   6/17/2024 11:45 AM Hardik Steinberg MD [facility] AMB   6/26/2024  3:30 PM [facility] PRIMARY [facility] AMB   6/26/2024  4:00 PM Domenica Ford MD [facility] AMB   7/3/2024  2:45 PM [facility] AMB   7/3/2024  3:15 PM Ivania Jones MD [facility] AMB       IZABEL Martinez, RN  Associate Care Manager   Cell: [phone]  [email or web address]

## 2024-06-04 ENCOUNTER — TELEPHONE (OUTPATIENT)
Dept: OBGYN CLINIC | Age: 39
End: 2024-06-04

## 2024-06-04 ENCOUNTER — HOSPITAL ENCOUNTER (OUTPATIENT)
Age: 39
Discharge: HOME | End: 2024-06-04
Attending: OBSTETRICS & GYNECOLOGY | Admitting: OBSTETRICS & GYNECOLOGY
Payer: MEDICAID

## 2024-06-04 VITALS
HEART RATE: 83 BPM | RESPIRATION RATE: 16 BRPM | TEMPERATURE: 98 F | SYSTOLIC BLOOD PRESSURE: 114 MMHG | DIASTOLIC BLOOD PRESSURE: 73 MMHG | BODY MASS INDEX: 33.11 KG/M2 | WEIGHT: 181 LBS | OXYGEN SATURATION: 99 %

## 2024-06-04 PROCEDURE — 59025 FETAL NON-STRESS TEST: CPT

## 2024-06-04 PROCEDURE — 99285 EMERGENCY DEPT VISIT HI MDM: CPT

## 2024-06-04 NOTE — TELEPHONE ENCOUNTER
Called pt and informed her of results. Pt voiced understanding and will discuss at her appointment tomorrow regarding starting synthroid.    ----- Message from Isa Krishnan MD sent at 6/3/2024  1:00 PM EDT -----  Repeat again shows NL TSH and slightly low T4.  This is gray zone. If she wants, we can start synthroid 25mcg daily. But we do not have to.

## 2024-06-05 ENCOUNTER — CARE COORDINATION (OUTPATIENT)
Dept: OTHER | Facility: CLINIC | Age: 39
End: 2024-06-05

## 2024-06-05 ENCOUNTER — ROUTINE PRENATAL (OUTPATIENT)
Dept: OBGYN CLINIC | Age: 39
End: 2024-06-05
Payer: MEDICAID

## 2024-06-05 ENCOUNTER — PROCEDURE VISIT (OUTPATIENT)
Dept: OBGYN CLINIC | Age: 39
End: 2024-06-05
Payer: MEDICAID

## 2024-06-05 VITALS — WEIGHT: 181 LBS | DIASTOLIC BLOOD PRESSURE: 72 MMHG | SYSTOLIC BLOOD PRESSURE: 110 MMHG | BODY MASS INDEX: 33.11 KG/M2

## 2024-06-05 DIAGNOSIS — O36.5930 POOR FETAL GROWTH AFFECTING MANAGEMENT OF MOTHER IN THIRD TRIMESTER, SINGLE OR UNSPECIFIED FETUS: ICD-10-CM

## 2024-06-05 DIAGNOSIS — O10.919 CHRONIC HYPERTENSION AFFECTING PREGNANCY: ICD-10-CM

## 2024-06-05 DIAGNOSIS — O99.513 ASTHMA AFFECTING PREGNANCY IN THIRD TRIMESTER: ICD-10-CM

## 2024-06-05 DIAGNOSIS — F32.A DEPRESSION WITH SUICIDAL IDEATION: ICD-10-CM

## 2024-06-05 DIAGNOSIS — O99.343 ANXIETY DURING PREGNANCY IN THIRD TRIMESTER, ANTEPARTUM: ICD-10-CM

## 2024-06-05 DIAGNOSIS — O99.320 MARIJUANA USE DURING PREGNANCY: ICD-10-CM

## 2024-06-05 DIAGNOSIS — J45.909 ASTHMA AFFECTING PREGNANCY IN THIRD TRIMESTER: ICD-10-CM

## 2024-06-05 DIAGNOSIS — Z72.89 DELIBERATE SELF-CUTTING: ICD-10-CM

## 2024-06-05 DIAGNOSIS — Z87.42 HISTORY OF POLYCYSTIC OVARIAN SYNDROME: ICD-10-CM

## 2024-06-05 DIAGNOSIS — O34.43 HISTORY OF LOOP ELECTROSURGICAL EXCISION PROCEDURE (LEEP) OF CERVIX AFFECTING PREGNANCY IN THIRD TRIMESTER: ICD-10-CM

## 2024-06-05 DIAGNOSIS — O98.319 GENITAL HERPES SIMPLEX VIRUS (HSV) INFECTION IN MOTHER AFFECTING PREGNANCY: ICD-10-CM

## 2024-06-05 DIAGNOSIS — Z98.890 HISTORY OF LOOP ELECTROSURGICAL EXCISION PROCEDURE (LEEP) OF CERVIX AFFECTING PREGNANCY IN THIRD TRIMESTER: ICD-10-CM

## 2024-06-05 DIAGNOSIS — F12.90 MARIJUANA USE DURING PREGNANCY: ICD-10-CM

## 2024-06-05 DIAGNOSIS — O09.523 HIGH RISK PREGNANCY, MULTIGRAVIDA OF ADVANCED MATERNAL AGE IN THIRD TRIMESTER: Primary | ICD-10-CM

## 2024-06-05 DIAGNOSIS — E07.9 THYROID DYSFUNCTION IN PREGNANCY IN THIRD TRIMESTER: ICD-10-CM

## 2024-06-05 DIAGNOSIS — F41.9 ANXIETY DURING PREGNANCY IN THIRD TRIMESTER, ANTEPARTUM: ICD-10-CM

## 2024-06-05 DIAGNOSIS — Z86.39 HISTORY OF HYPOTHYROIDISM: ICD-10-CM

## 2024-06-05 DIAGNOSIS — Z91.89 AT RISK FOR CANCER: ICD-10-CM

## 2024-06-05 DIAGNOSIS — O99.283 THYROID DYSFUNCTION IN PREGNANCY IN THIRD TRIMESTER: ICD-10-CM

## 2024-06-05 DIAGNOSIS — R45.851 DEPRESSION WITH SUICIDAL IDEATION: ICD-10-CM

## 2024-06-05 DIAGNOSIS — A60.09 GENITAL HERPES SIMPLEX VIRUS (HSV) INFECTION IN MOTHER AFFECTING PREGNANCY: ICD-10-CM

## 2024-06-05 DIAGNOSIS — R00.2 PALPITATIONS: ICD-10-CM

## 2024-06-05 PROCEDURE — 99214 OFFICE O/P EST MOD 30 MIN: CPT | Performed by: OBSTETRICS & GYNECOLOGY

## 2024-06-05 PROCEDURE — 76819 FETAL BIOPHYS PROFIL W/O NST: CPT | Performed by: OBSTETRICS & GYNECOLOGY

## 2024-06-05 RX ORDER — LEVOTHYROXINE SODIUM 25 MCG
25 TABLET ORAL DAILY
Qty: 30 TABLET | Refills: 3 | Status: SHIPPED | OUTPATIENT
Start: 2024-06-05

## 2024-06-05 NOTE — PROGRESS NOTES
has a c/s if she'd like. It means she can never get pregnant again. She says she understand this. She may be approved, given her bmi is 32. Also fam hx breast ca. Asked staff to get her to sign tiago papers.  Doesn't want anyone mentioning hsv hx with people in room. Of course we are careful to not divulge private info. But we are there to accomplish medical work. So any time we enter a room we need to be able to do this work, including asking appropriate Qs. Advised pt that any time a physician comes into an exam room, we need to be able to have open and forthright convos with the pt. We do not discuss info with other people, but we do have to d/w the pt herself. So she needs to send unwanted visitors out of the room if their presence hinder her ability to have open d/w us.   How much more wt will I gain? I cannot predict this.

## 2024-06-05 NOTE — H&P
History & Physical    Name: Юлия Quintero MRN: [id number]  SSN: [id number]    YOB: 1985  Age: 39 y.o.  Sex: female      Subjective:     Reason for Triage visit:  35w2d and had episode of feeling faint 2 hours ago    History of Present Illness: Ms. Quintero is a 39 y.o.  female with an estimated gestational age of 35w2d with Estimated Date of Delivery: 24. On arrival, patient crying states that she hasn't eaten well today and felt faint bc of this.  When questioned about this in more detail she states no eating d/o and financially can get food. Just doesn't feel like eating.  Long hx of depression, self harm, and suicidal ideation. Failed lexapro.  Referred to group therapy and didn't like it.  Was doing daily telepsych , started prozac and seemingly did better for awhile but felt \" emotionless\".  Stopped and did ok for a few weeks. Now no longer taking advantage of telepsych, not returning calls to .  Tonight states \"baby would be better off on outside\".  Questioned about SI and she states has been suicidal in past but is not suicidal now.  Just thinks \" baby better off outside her and better delivered early than her caring for it.\"  \"Im not looking after myself well, not eating, not sleeping well\"   Pregnancy has been  complicated by hx of depression, self mutilation, hypothyroid, asthma, poor fetal growth, and anxiety. Patient denies abdominal pain  , chest pain, contractions, fever, headache , nausea and vomiting, pelvic pressure, right upper quadrant pain  , shortness of breath, swelling, vaginal bleeding , vaginal leaking of fluid , and visual disturbances.    OB History    Para Term  AB Living   4 2 2   1 2   SAB IAB Ectopic Molar Multiple Live Births   1 0       2      # Outcome Date GA Lbr David/2nd Weight Sex Delivery Anes PTL Lv   4 Current            3 SAB 2023           2 Term 08 39w0d  3.572 kg (7 lb 14 oz) F Vag-Spont None N SVITLANA      
History     Occupational History    Not on file   Tobacco Use    Smoking status: Never    Smokeless tobacco: Never   Vaping Use    Vaping Use: Never used   Substance and Sexual Activity    Alcohol use: Not Currently    Drug use: Not Currently    Sexual activity: Yes     Partners: Male     Birth control/protection: Inserts      Family History   Problem Relation Age of Onset    Diabetes Paternal Grandmother     Diabetes Maternal Grandmother     Asthma Mother     Migraines Mother     Diabetes Mother     Anxiety Disorder Mother     Mental Illness Mother     Asthma Brother     Breast Cancer Maternal Aunt     Cancer Maternal Uncle         lung    Breast Cancer Maternal Cousin         late 20's    Breast Cancer Other         maternal great aunt    Ovarian Cancer Neg Hx     Colon Cancer Neg Hx        Allergies   Allergen Reactions    Fluconazole Hives    Propranolol Rash     Prior to Admission medications    Medication Sig Start Date End Date Taking? Authorizing Provider   Iron Combinations (IRON COMPLEX) CAPS     Bisi Cain MD   Ascorbic Acid (VITAMIN C) 500 MG CAPS     Bisi Cain MD   Misc. Devices KIT Nature's Blend Prenatal Multivitamin with Minerals ([facility] Baby Box)  ND: [address]-17;  Take 1 softgel by mouth daily or as instructed by provider;  #qs for 6 months 3/13/24   Diego Mendez MD   valACYclovir (VALTREX) 500 MG tablet Take 2 tablets by mouth daily    ProviderBisi MD   Cholecalciferol (VITAMIN D) 50 MCG ( UT) CAPS capsule Take by mouth    ProviderBisi MD   NONFORMULARY     ProviderBisi MD        Review of Systems:  Complete review of systems performed.  Those not specifically mentioned in the HPI are either negative are non related to this patient encounter.    Objective:     Vitals:  There were no vitals filed for this visit.   Temp (24hrs), Av.1 °F (36.7 °C), Min:98.1 °F (36.7 °C), Max:98.1 °F (36.7 °C)    BP  Min: 108/74  Max: 108/74       Physical

## 2024-06-06 ENCOUNTER — FOLLOWUP TELEPHONE ENCOUNTER (OUTPATIENT)
Dept: CASE MANAGEMENT | Age: 39
End: 2024-06-06

## 2024-06-06 NOTE — TELEPHONE ENCOUNTER
Earlier in the year, patient was participating in an IOP at [facility], but she is no longer receiving any counseling.  Patient is agreeable for  to assist with finding a mental health provider.  Patient verbalized a preference for [facility].  Phone call placed to [facility] ([phone]).  Per  (Job), therapist Ale Lee has availability next week and accepts [place] [employer].   provided Job with patient's number, and he will call her to schedule an appointment.       will follow-up with patient tomorrow.   also encouraged patient to reach out via call/text with any needs.    ZAYNAB Vela-ANA, [facility]  [facility]   [phone]

## 2024-06-07 ENCOUNTER — FOLLOWUP TELEPHONE ENCOUNTER (OUTPATIENT)
Dept: CASE MANAGEMENT | Age: 39
End: 2024-06-07

## 2024-06-07 NOTE — TELEPHONE ENCOUNTER
1250:  Phone call to patient at [phone].  No answer; message left requesting call back.    1450:  Second phone call placed to patient.  Patient states that she is feeling better today as she's gotten out of the house, is going to eat lunch with her mom, and then going to the pool.    Per patient, she has not yet heard from [facility].   provided patient with practice phone # so she can contact them.    Patient agreeable for  to call back next week.     ZAYNAB Veal-ANA, [facility]  [facility]   [phone]

## 2024-06-10 NOTE — PROGRESS NOTES
Юлия  presents for EMILY. . 36w3d.    Taking Prenatal Vitamins: Yes  She is noticing:  no unusual complaints  Mfm 6-17  Itching over whole body      US shows:  Bpp   Janine 14  cephalic  Images reviewed.  Official report sent for scanning to chart'    As some impt info is always in the OB flowsheet, please also refer to that- including for billing/coding Qs.     No problem-specific Assessment & Plan notes found for this encounter.       Юлия was seen today for routine prenatal visit and ultrasound.    Diagnoses and all orders for this visit:    High risk pregnancy, multigravida of advanced maternal age in third trimester  -     Comprehensive Metabolic Panel; Future  -     Bile Acids, Total; Future    Genital herpes simplex virus (HSV) infection in mother affecting pregnancy    Chronic hypertension affecting pregnancy    Poor fetal growth affecting management of mother in third trimester, single or unspecified fetus     screening for streptococcus B  -     Culture, Strep B Screen, Vaginal/Rectal; Future  -     Culture, Strep B Screen, Vaginal/Rectal    Screening for STDs (sexually transmitted diseases)  -     C.trachomatis N.gonorrhoeae DNA; Future  -     C.trachomatis N.gonorrhoeae DNA    Pruritus of pregnancy in third trimester  -     Comprehensive Metabolic Panel; Future  -     Bile Acids, Total; Future    36 weeks gestation of pregnancy

## 2024-06-11 ENCOUNTER — FOLLOWUP TELEPHONE ENCOUNTER (OUTPATIENT)
Dept: CASE MANAGEMENT | Age: 39
End: 2024-06-11

## 2024-06-11 NOTE — TELEPHONE ENCOUNTER
Phone call to patient at [phone].      No answer; message left requesting call back.    ZAYNAB Vela-ANA, [facility]-C  [facility]   [phone]

## 2024-06-12 ENCOUNTER — ROUTINE PRENATAL (OUTPATIENT)
Dept: OBGYN CLINIC | Age: 39
End: 2024-06-12
Payer: MEDICAID

## 2024-06-12 ENCOUNTER — PROCEDURE VISIT (OUTPATIENT)
Dept: OBGYN CLINIC | Age: 39
End: 2024-06-12
Payer: MEDICAID

## 2024-06-12 VITALS — DIASTOLIC BLOOD PRESSURE: 62 MMHG | SYSTOLIC BLOOD PRESSURE: 114 MMHG | BODY MASS INDEX: 33.22 KG/M2 | WEIGHT: 181.6 LBS

## 2024-06-12 DIAGNOSIS — O10.919 CHRONIC HYPERTENSION AFFECTING PREGNANCY: ICD-10-CM

## 2024-06-12 DIAGNOSIS — O09.523 HIGH RISK PREGNANCY, MULTIGRAVIDA OF ADVANCED MATERNAL AGE IN THIRD TRIMESTER: Primary | ICD-10-CM

## 2024-06-12 DIAGNOSIS — A60.09 GENITAL HERPES SIMPLEX VIRUS (HSV) INFECTION IN MOTHER AFFECTING PREGNANCY: ICD-10-CM

## 2024-06-12 DIAGNOSIS — Z3A.36 36 WEEKS GESTATION OF PREGNANCY: ICD-10-CM

## 2024-06-12 DIAGNOSIS — Z36.85 ANTENATAL SCREENING FOR STREPTOCOCCUS B: ICD-10-CM

## 2024-06-12 DIAGNOSIS — Z87.42 HISTORY OF POLYCYSTIC OVARIAN SYNDROME: ICD-10-CM

## 2024-06-12 DIAGNOSIS — O99.713 PRURITUS OF PREGNANCY IN THIRD TRIMESTER: ICD-10-CM

## 2024-06-12 DIAGNOSIS — Z11.3 SCREENING FOR STDS (SEXUALLY TRANSMITTED DISEASES): ICD-10-CM

## 2024-06-12 DIAGNOSIS — O98.319 GENITAL HERPES SIMPLEX VIRUS (HSV) INFECTION IN MOTHER AFFECTING PREGNANCY: ICD-10-CM

## 2024-06-12 DIAGNOSIS — O99.320 MARIJUANA USE DURING PREGNANCY: ICD-10-CM

## 2024-06-12 DIAGNOSIS — O36.5930 POOR FETAL GROWTH AFFECTING MANAGEMENT OF MOTHER IN THIRD TRIMESTER, SINGLE OR UNSPECIFIED FETUS: ICD-10-CM

## 2024-06-12 DIAGNOSIS — F12.90 MARIJUANA USE DURING PREGNANCY: ICD-10-CM

## 2024-06-12 DIAGNOSIS — L29.9 PRURITUS OF PREGNANCY IN THIRD TRIMESTER: ICD-10-CM

## 2024-06-12 PROCEDURE — 76819 FETAL BIOPHYS PROFIL W/O NST: CPT | Performed by: OBSTETRICS & GYNECOLOGY

## 2024-06-12 PROCEDURE — 99213 OFFICE O/P EST LOW 20 MIN: CPT | Performed by: OBSTETRICS & GYNECOLOGY

## 2024-06-13 DIAGNOSIS — L29.9 PRURITUS OF PREGNANCY IN THIRD TRIMESTER: ICD-10-CM

## 2024-06-13 DIAGNOSIS — O99.713 PRURITUS OF PREGNANCY IN THIRD TRIMESTER: ICD-10-CM

## 2024-06-13 DIAGNOSIS — O09.523 HIGH RISK PREGNANCY, MULTIGRAVIDA OF ADVANCED MATERNAL AGE IN THIRD TRIMESTER: ICD-10-CM

## 2024-06-13 LAB
ALBUMIN SERPL-MCNC: 2.8 G/DL (ref 3.5–5)
ALBUMIN/GLOB SERPL: 0.8 (ref 1–1.9)
ALP SERPL-CCNC: 110 U/L (ref 35–104)
ALT SERPL-CCNC: 33 U/L (ref 12–65)
ANION GAP SERPL CALC-SCNC: 13 MMOL/L (ref 9–18)
AST SERPL-CCNC: 26 U/L (ref 15–37)
BILIRUB SERPL-MCNC: <0.2 MG/DL (ref 0–1.2)
BUN SERPL-MCNC: 10 MG/DL (ref 6–23)
CALCIUM SERPL-MCNC: 9.4 MG/DL (ref 8.8–10.2)
CHLORIDE SERPL-SCNC: 102 MMOL/L (ref 98–107)
CO2 SERPL-SCNC: 23 MMOL/L (ref 20–28)
CREAT SERPL-MCNC: 0.59 MG/DL (ref 0.6–1.1)
GLOBULIN SER CALC-MCNC: 3.6 G/DL (ref 2.3–3.5)
GLUCOSE SERPL-MCNC: 99 MG/DL (ref 70–99)
POTASSIUM SERPL-SCNC: 4 MMOL/L (ref 3.5–5.1)
PROT SERPL-MCNC: 6.3 G/DL (ref 6.3–8.2)
SODIUM SERPL-SCNC: 137 MMOL/L (ref 136–145)

## 2024-06-14 LAB — BILE AC SERPL-SCNC: 3.2 UMOL/L (ref 0–10)

## 2024-06-16 LAB
BACTERIA SPEC CULT: NORMAL
SERVICE CMNT-IMP: NORMAL

## 2024-06-17 ENCOUNTER — ROUTINE PRENATAL (OUTPATIENT)
Dept: OBGYN CLINIC | Age: 39
End: 2024-06-17
Payer: MEDICAID

## 2024-06-17 VITALS — SYSTOLIC BLOOD PRESSURE: 107 MMHG | DIASTOLIC BLOOD PRESSURE: 75 MMHG | HEART RATE: 101 BPM

## 2024-06-17 DIAGNOSIS — O34.43 HISTORY OF LOOP ELECTROSURGICAL EXCISION PROCEDURE (LEEP) OF CERVIX AFFECTING PREGNANCY IN THIRD TRIMESTER: ICD-10-CM

## 2024-06-17 DIAGNOSIS — F12.90 MARIJUANA USE DURING PREGNANCY: ICD-10-CM

## 2024-06-17 DIAGNOSIS — A60.09 GENITAL HERPES SIMPLEX VIRUS (HSV) INFECTION IN MOTHER AFFECTING PREGNANCY: ICD-10-CM

## 2024-06-17 DIAGNOSIS — R45.851 DEPRESSION WITH SUICIDAL IDEATION: ICD-10-CM

## 2024-06-17 DIAGNOSIS — Z98.890 HISTORY OF LOOP ELECTROSURGICAL EXCISION PROCEDURE (LEEP) OF CERVIX AFFECTING PREGNANCY IN THIRD TRIMESTER: ICD-10-CM

## 2024-06-17 DIAGNOSIS — O36.5930 POOR FETAL GROWTH AFFECTING MANAGEMENT OF MOTHER IN THIRD TRIMESTER, SINGLE OR UNSPECIFIED FETUS: Primary | ICD-10-CM

## 2024-06-17 DIAGNOSIS — Z87.42 HISTORY OF POLYCYSTIC OVARIAN SYNDROME: ICD-10-CM

## 2024-06-17 DIAGNOSIS — R00.2 PALPITATIONS: ICD-10-CM

## 2024-06-17 DIAGNOSIS — O99.513 ASTHMA AFFECTING PREGNANCY IN THIRD TRIMESTER: ICD-10-CM

## 2024-06-17 DIAGNOSIS — O99.343 ANXIETY DURING PREGNANCY IN THIRD TRIMESTER, ANTEPARTUM: ICD-10-CM

## 2024-06-17 DIAGNOSIS — Z72.89 DELIBERATE SELF-CUTTING: ICD-10-CM

## 2024-06-17 DIAGNOSIS — Z86.39 HISTORY OF HYPOTHYROIDISM: ICD-10-CM

## 2024-06-17 DIAGNOSIS — O98.319 GENITAL HERPES SIMPLEX VIRUS (HSV) INFECTION IN MOTHER AFFECTING PREGNANCY: ICD-10-CM

## 2024-06-17 DIAGNOSIS — O09.523 HIGH RISK PREGNANCY, MULTIGRAVIDA OF ADVANCED MATERNAL AGE IN THIRD TRIMESTER: ICD-10-CM

## 2024-06-17 DIAGNOSIS — Z3A.37 37 WEEKS GESTATION OF PREGNANCY: ICD-10-CM

## 2024-06-17 DIAGNOSIS — F41.9 ANXIETY DURING PREGNANCY IN THIRD TRIMESTER, ANTEPARTUM: ICD-10-CM

## 2024-06-17 DIAGNOSIS — F32.A DEPRESSION WITH SUICIDAL IDEATION: ICD-10-CM

## 2024-06-17 DIAGNOSIS — O26.86 PRURITIC URTICARIAL PAPULES AND PLAQUES OF PREGNANCY: ICD-10-CM

## 2024-06-17 DIAGNOSIS — E66.9 OBESITY, CLASS I, BMI 30-34.9: ICD-10-CM

## 2024-06-17 DIAGNOSIS — J45.909 ASTHMA AFFECTING PREGNANCY IN THIRD TRIMESTER: ICD-10-CM

## 2024-06-17 DIAGNOSIS — O10.919 CHRONIC HYPERTENSION AFFECTING PREGNANCY: ICD-10-CM

## 2024-06-17 DIAGNOSIS — Z91.89 AT RISK FOR CANCER: ICD-10-CM

## 2024-06-17 DIAGNOSIS — O99.320 MARIJUANA USE DURING PREGNANCY: ICD-10-CM

## 2024-06-17 LAB
C TRACH RRNA SPEC QL NAA+PROBE: NEGATIVE
N GONORRHOEA RRNA SPEC QL NAA+PROBE: NEGATIVE
SPECIMEN SOURCE: NORMAL

## 2024-06-17 PROCEDURE — 76816 OB US FOLLOW-UP PER FETUS: CPT | Performed by: OBSTETRICS & GYNECOLOGY

## 2024-06-17 PROCEDURE — 99214 OFFICE O/P EST MOD 30 MIN: CPT | Performed by: OBSTETRICS & GYNECOLOGY

## 2024-06-17 PROCEDURE — 76819 FETAL BIOPHYS PROFIL W/O NST: CPT | Performed by: OBSTETRICS & GYNECOLOGY

## 2024-06-17 PROCEDURE — 76820 UMBILICAL ARTERY ECHO: CPT | Performed by: OBSTETRICS & GYNECOLOGY

## 2024-06-17 ASSESSMENT — PATIENT HEALTH QUESTIONNAIRE - PHQ9
SUM OF ALL RESPONSES TO PHQ QUESTIONS 1-9: 2
SUM OF ALL RESPONSES TO PHQ QUESTIONS 1-9: 2
1. LITTLE INTEREST OR PLEASURE IN DOING THINGS: SEVERAL DAYS
2. FEELING DOWN, DEPRESSED OR HOPELESS: SEVERAL DAYS
SUM OF ALL RESPONSES TO PHQ QUESTIONS 1-9: 2
SUM OF ALL RESPONSES TO PHQ9 QUESTIONS 1 & 2: 2
SUM OF ALL RESPONSES TO PHQ QUESTIONS 1-9: 2

## 2024-06-17 NOTE — PROGRESS NOTES
[facility] MATERNAL FETAL MEDICINE    [address] [place] [address]  P- [phone]  [phone]       [facility] Follow-up Visit    Юлия Quintero (1985) is a 39 y.o.  at 37w1d with 2024, by Last Menstrual Period.   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  Not currently working.  Patient is scheduled to see Primary OB ([facility]) on 2024.  Support person, mother, present today.     Interval history since prior appt reviewed and chart updated as indicated.    No HAs, edema.  Denies preeclamptic symptoms.  Reports good fetal movement.  No bleeding, LOF, cramping, ctxs, or vaginal pressure.        Mood evaluated today based on discussion with pt and PHQ screen.       2024    11:37 AM   PHQ-9    Little interest or pleasure in doing things 1   Feeling down, depressed, or hopeless 1   PHQ-2 Score 2   PHQ-9 Total Score 2      Mood Reassuring today    Addressed normal pregnancy complaints, reassured and offered suggestions for care  Reviewed gestational age precautions and activity goals/limitations  Nutritional counseling as well as specific goals based on current maternal and fetal status  Options for GERD, constipation, other common complaints reviewed.   Reviewed gestational age appropriate preventive care regarding communicable disease transmission and vaccines as appropriate (including flu, TDaP >28wk, RSV 32-36wk, and COVID.)    Exam:     Vitals:    24 1201   BP: 107/75   Pulse: (!) 101      Physical Exam  Applicable labs reviewed.   Please see formal ultrasound report under imaging tab.      ASSESSMENT/PLAN:  Patient Active Problem List    Diagnosis Date Noted    Anxiety during pregnancy

## 2024-06-19 ENCOUNTER — FOLLOWUP TELEPHONE ENCOUNTER (OUTPATIENT)
Dept: CASE MANAGEMENT | Age: 39
End: 2024-06-19

## 2024-06-19 NOTE — TELEPHONE ENCOUNTER
Phone call to patient at [phone].       Patient states, \"Everything is going fine.\"  Per patient, she has started her thyroid medication and feels like it is helping her mood some.  She is not taking the Zoloft.      Patient states that no one from [facility] ever contacted her to schedule an appointment.  SW will reach out to practice regarding previous referral.  Patient states that she will also contact [facility] to follow-up.    Patient denied any needs at this time.     Jennifer Lowery, ZAYNAB-ANA, PM-C  [facility]   [phone]

## 2024-06-26 ENCOUNTER — PROCEDURE VISIT (OUTPATIENT)
Dept: OBGYN CLINIC | Age: 39
End: 2024-06-26
Payer: MEDICAID

## 2024-06-26 ENCOUNTER — ROUTINE PRENATAL (OUTPATIENT)
Dept: OBGYN CLINIC | Age: 39
End: 2024-06-26
Payer: MEDICAID

## 2024-06-26 VITALS — WEIGHT: 185 LBS | BODY MASS INDEX: 33.84 KG/M2 | SYSTOLIC BLOOD PRESSURE: 124 MMHG | DIASTOLIC BLOOD PRESSURE: 76 MMHG

## 2024-06-26 DIAGNOSIS — Z91.89 AT RISK FOR CANCER: ICD-10-CM

## 2024-06-26 DIAGNOSIS — O99.343 ANXIETY DURING PREGNANCY IN THIRD TRIMESTER, ANTEPARTUM: ICD-10-CM

## 2024-06-26 DIAGNOSIS — O99.513 ASTHMA AFFECTING PREGNANCY IN THIRD TRIMESTER: ICD-10-CM

## 2024-06-26 DIAGNOSIS — F41.9 ANXIETY DURING PREGNANCY IN THIRD TRIMESTER, ANTEPARTUM: ICD-10-CM

## 2024-06-26 DIAGNOSIS — O99.320 MARIJUANA USE DURING PREGNANCY: ICD-10-CM

## 2024-06-26 DIAGNOSIS — O26.86 PRURITIC URTICARIAL PAPULES AND PLAQUES OF PREGNANCY: ICD-10-CM

## 2024-06-26 DIAGNOSIS — O36.5930 POOR FETAL GROWTH AFFECTING MANAGEMENT OF MOTHER IN THIRD TRIMESTER, SINGLE OR UNSPECIFIED FETUS: ICD-10-CM

## 2024-06-26 DIAGNOSIS — Z87.42 HISTORY OF POLYCYSTIC OVARIAN SYNDROME: ICD-10-CM

## 2024-06-26 DIAGNOSIS — Z98.890 HISTORY OF LOOP ELECTROSURGICAL EXCISION PROCEDURE (LEEP) OF CERVIX AFFECTING PREGNANCY IN THIRD TRIMESTER: ICD-10-CM

## 2024-06-26 DIAGNOSIS — F32.A DEPRESSION WITH SUICIDAL IDEATION: ICD-10-CM

## 2024-06-26 DIAGNOSIS — J45.909 ASTHMA AFFECTING PREGNANCY IN THIRD TRIMESTER: ICD-10-CM

## 2024-06-26 DIAGNOSIS — O10.919 CHRONIC HYPERTENSION AFFECTING PREGNANCY: ICD-10-CM

## 2024-06-26 DIAGNOSIS — R00.2 PALPITATIONS: ICD-10-CM

## 2024-06-26 DIAGNOSIS — R45.851 DEPRESSION WITH SUICIDAL IDEATION: ICD-10-CM

## 2024-06-26 DIAGNOSIS — Z72.89 DELIBERATE SELF-CUTTING: ICD-10-CM

## 2024-06-26 DIAGNOSIS — A60.09 GENITAL HERPES SIMPLEX VIRUS (HSV) INFECTION IN MOTHER AFFECTING PREGNANCY: Primary | ICD-10-CM

## 2024-06-26 DIAGNOSIS — O98.319 GENITAL HERPES SIMPLEX VIRUS (HSV) INFECTION IN MOTHER AFFECTING PREGNANCY: Primary | ICD-10-CM

## 2024-06-26 DIAGNOSIS — Z86.39 HISTORY OF HYPOTHYROIDISM: ICD-10-CM

## 2024-06-26 DIAGNOSIS — O09.523 HIGH RISK PREGNANCY, MULTIGRAVIDA OF ADVANCED MATERNAL AGE IN THIRD TRIMESTER: ICD-10-CM

## 2024-06-26 DIAGNOSIS — O34.43 HISTORY OF LOOP ELECTROSURGICAL EXCISION PROCEDURE (LEEP) OF CERVIX AFFECTING PREGNANCY IN THIRD TRIMESTER: ICD-10-CM

## 2024-06-26 DIAGNOSIS — F12.90 MARIJUANA USE DURING PREGNANCY: ICD-10-CM

## 2024-06-26 PROCEDURE — 99214 OFFICE O/P EST MOD 30 MIN: CPT | Performed by: OBSTETRICS & GYNECOLOGY

## 2024-06-26 PROCEDURE — 76819 FETAL BIOPHYS PROFIL W/O NST: CPT | Performed by: OBSTETRICS & GYNECOLOGY

## 2024-06-26 NOTE — PROGRESS NOTES
Chief Complaint   Patient presents with    Routine Prenatal Visit     Юлия  presents for EMILY.     +FM, no VB, no LOF. +occas ctx.     Denies SI or HI.    Vitals:    24 1604   BP: 124/76         2024     4:04 PM 2024     4:12 PM 2024     2:05 PM 2024     8:48 PM 2024     3:37 PM 2024     1:37 PM 2024     3:42 PM   Weight Metrics   Weight 185 lb 181 lb 9.6 oz 181 lb 181 lb 181 lb 180 lb 177 lb   BMI (Calculated) 0 kg/m2 0 kg/m2 0 kg/m2 0 kg/m2 0 kg/m2 0 kg/m2 0 kg/m2     Ultrasound today:  See scanned report for full details.   All images viewed, read and interpreted by this MD.  BPP   KATINA 15.6cm, SDVP 6.37  FHR 129bpm  Anterior placenta  Cephalic  Impression/Interpretation: Reactive/reassuring fetal testing at 38wks  Plan: Continue 2x wk testing due to CHTN in pregnancy  Domenica Ford MD FACOG      1. Genital herpes simplex virus (HSV) infection in mother affecting pregnancy    2. History of loop electrosurgical excision procedure (LEEP) of cervix affecting pregnancy in third trimester    3. Anxiety during pregnancy in third trimester, antepartum    4. Marijuana use during pregnancy    5. Chronic hypertension affecting pregnancy    6. High risk pregnancy, multigravida of advanced maternal age in third trimester    7. History of polycystic ovarian syndrome    8. Asthma affecting pregnancy in third trimester    9. History of hypothyroidism    10. Deliberate self-cutting    11. Depression with suicidal ideation    12. Palpitations    13. At risk for cancer    14. Poor fetal growth affecting management of mother in third trimester, single or unspecified fetus    15. Pruritic urticarial papules and plaques of pregnancy      No orders of the defined types were placed in this encounter.    No orders of the defined types were placed in this encounter.    40yo . 38w3d for EMILY:    Reviewed her recent OB ED notes. Discussed at length with pt. Pt denies any SI or HI. She

## 2024-06-27 ENCOUNTER — TELEPHONE (OUTPATIENT)
Dept: OBGYN CLINIC | Age: 39
End: 2024-06-27

## 2024-06-27 NOTE — TELEPHONE ENCOUNTER
Induction scheduled with [facility] Antepartum.  Induction 6/30 with Dr Flores d/t chronic hypertension.   Pt notified of instructions.

## 2024-06-29 ENCOUNTER — HOSPITAL ENCOUNTER (INPATIENT)
Age: 39
LOS: 3 days | Discharge: HOME | DRG: 560 | End: 2024-07-02
Attending: OBSTETRICS & GYNECOLOGY | Admitting: OBSTETRICS & GYNECOLOGY
Payer: MEDICAID

## 2024-06-29 PROBLEM — O36.5910 POOR FETAL GROWTH AFFECTING MANAGEMENT OF MOTHER IN FIRST TRIMESTER, SINGLE OR UNSPECIFIED FETUS: Status: ACTIVE | Noted: 2024-06-29

## 2024-06-29 PROCEDURE — 86780 TREPONEMA PALLIDUM: CPT

## 2024-06-29 PROCEDURE — 10907ZC DRAINAGE OF AMNIOTIC FLUID, THERAPEUTIC FROM PRODUCTS OF CONCEPTION, VIA NATURAL OR ARTIFICIAL OPENING: ICD-10-PCS | Performed by: OBSTETRICS & GYNECOLOGY

## 2024-06-29 PROCEDURE — 85027 COMPLETE CBC AUTOMATED: CPT

## 2024-06-29 PROCEDURE — 1100000000 HC RM PRIVATE

## 2024-06-29 PROCEDURE — 3E0DXGC INTRODUCTION OF OTHER THERAPEUTIC SUBSTANCE INTO MOUTH AND PHARYNX, EXTERNAL APPROACH: ICD-10-PCS | Performed by: OBSTETRICS & GYNECOLOGY

## 2024-06-29 PROCEDURE — 86850 RBC ANTIBODY SCREEN: CPT

## 2024-06-29 PROCEDURE — 3E033VJ INTRODUCTION OF OTHER HORMONE INTO PERIPHERAL VEIN, PERCUTANEOUS APPROACH: ICD-10-PCS | Performed by: OBSTETRICS & GYNECOLOGY

## 2024-06-29 PROCEDURE — 86900 BLOOD TYPING SEROLOGIC ABO: CPT

## 2024-06-29 PROCEDURE — 86901 BLOOD TYPING SEROLOGIC RH(D): CPT

## 2024-06-29 RX ORDER — SODIUM CHLORIDE 0.9 % (FLUSH) 0.9 %
5-40 SYRINGE (ML) INJECTION EVERY 12 HOURS SCHEDULED
Status: DISCONTINUED | OUTPATIENT
Start: 2024-06-29 | End: 2024-06-30

## 2024-06-29 RX ORDER — ONDANSETRON 4 MG/1
4 TABLET, ORALLY DISINTEGRATING ORAL EVERY 6 HOURS PRN
Status: CANCELLED | OUTPATIENT
Start: 2024-06-29

## 2024-06-29 RX ORDER — SODIUM CHLORIDE 0.9 % (FLUSH) 0.9 %
5-40 SYRINGE (ML) INJECTION PRN
Status: DISCONTINUED | OUTPATIENT
Start: 2024-06-29 | End: 2024-06-30

## 2024-06-29 RX ORDER — METHYLERGONOVINE MALEATE 0.2 MG/ML
200 INJECTION INTRAVENOUS PRN
Status: CANCELLED | OUTPATIENT
Start: 2024-06-29

## 2024-06-29 RX ORDER — SODIUM CHLORIDE 0.9 % (FLUSH) 0.9 %
5-40 SYRINGE (ML) INJECTION PRN
Status: CANCELLED | OUTPATIENT
Start: 2024-06-29

## 2024-06-29 RX ORDER — SODIUM CHLORIDE 0.9 % (FLUSH) 0.9 %
5-40 SYRINGE (ML) INJECTION EVERY 12 HOURS SCHEDULED
Status: CANCELLED | OUTPATIENT
Start: 2024-06-29

## 2024-06-29 RX ORDER — SODIUM CHLORIDE, SODIUM LACTATE, POTASSIUM CHLORIDE, AND CALCIUM CHLORIDE .6; .31; .03; .02 G/100ML; G/100ML; G/100ML; G/100ML
500 INJECTION, SOLUTION INTRAVENOUS PRN
Status: CANCELLED | OUTPATIENT
Start: 2024-06-29

## 2024-06-29 RX ORDER — ONDANSETRON 2 MG/ML
4 INJECTION INTRAMUSCULAR; INTRAVENOUS EVERY 6 HOURS PRN
Status: CANCELLED | OUTPATIENT
Start: 2024-06-29

## 2024-06-29 RX ORDER — TERBUTALINE SULFATE 1 MG/ML
0.25 INJECTION, SOLUTION SUBCUTANEOUS ONCE
Status: DISCONTINUED | OUTPATIENT
Start: 2024-06-29 | End: 2024-06-30

## 2024-06-29 RX ORDER — MISOPROSTOL 100 UG/1
400 TABLET ORAL PRN
Status: CANCELLED | OUTPATIENT
Start: 2024-06-29

## 2024-06-29 RX ORDER — DEXTROSE, SODIUM CHLORIDE, SODIUM LACTATE, POTASSIUM CHLORIDE, AND CALCIUM CHLORIDE 5; .6; .31; .03; .02 G/100ML; G/100ML; G/100ML; G/100ML; G/100ML
INJECTION, SOLUTION INTRAVENOUS CONTINUOUS
Status: CANCELLED | OUTPATIENT
Start: 2024-06-29

## 2024-06-29 RX ORDER — ACETAMINOPHEN 325 MG/1
650 TABLET ORAL EVERY 4 HOURS PRN
Status: CANCELLED | OUTPATIENT
Start: 2024-06-29

## 2024-06-29 RX ORDER — ONDANSETRON 4 MG/1
4 TABLET, ORALLY DISINTEGRATING ORAL EVERY 6 HOURS PRN
Status: DISCONTINUED | OUTPATIENT
Start: 2024-06-29 | End: 2024-06-30 | Stop reason: SDUPTHER

## 2024-06-29 RX ORDER — CARBOPROST TROMETHAMINE 250 UG/ML
250 INJECTION, SOLUTION INTRAMUSCULAR PRN
Status: CANCELLED | OUTPATIENT
Start: 2024-06-29

## 2024-06-29 RX ORDER — SODIUM CHLORIDE 9 MG/ML
INJECTION, SOLUTION INTRAVENOUS PRN
Status: DISCONTINUED | OUTPATIENT
Start: 2024-06-29 | End: 2024-06-30

## 2024-06-29 RX ORDER — TERBUTALINE SULFATE 1 MG/ML
0.25 INJECTION, SOLUTION SUBCUTANEOUS ONCE
Status: CANCELLED | OUTPATIENT
Start: 2024-06-29 | End: 2024-06-29

## 2024-06-29 RX ORDER — SODIUM CHLORIDE, SODIUM LACTATE, POTASSIUM CHLORIDE, AND CALCIUM CHLORIDE .6; .31; .03; .02 G/100ML; G/100ML; G/100ML; G/100ML
1000 INJECTION, SOLUTION INTRAVENOUS PRN
Status: CANCELLED | OUTPATIENT
Start: 2024-06-29

## 2024-06-29 RX ORDER — ONDANSETRON 2 MG/ML
4 INJECTION INTRAMUSCULAR; INTRAVENOUS EVERY 6 HOURS PRN
Status: DISCONTINUED | OUTPATIENT
Start: 2024-06-29 | End: 2024-06-30 | Stop reason: SDUPTHER

## 2024-06-29 RX ORDER — ACETAMINOPHEN 325 MG/1
650 TABLET ORAL EVERY 4 HOURS PRN
Status: DISCONTINUED | OUTPATIENT
Start: 2024-06-29 | End: 2024-06-30 | Stop reason: SDUPTHER

## 2024-06-29 RX ORDER — SODIUM CHLORIDE 9 MG/ML
INJECTION, SOLUTION INTRAVENOUS PRN
Status: CANCELLED | OUTPATIENT
Start: 2024-06-29

## 2024-06-30 ENCOUNTER — ANESTHESIA (OUTPATIENT)
Dept: LABOR AND DELIVERY | Age: 39
End: 2024-06-30
Payer: MEDICAID

## 2024-06-30 ENCOUNTER — ANESTHESIA EVENT (OUTPATIENT)
Dept: LABOR AND DELIVERY | Age: 39
End: 2024-06-30
Payer: MEDICAID

## 2024-06-30 PROBLEM — Z34.90 ENCOUNTER FOR ELECTIVE INDUCTION OF LABOR: Status: ACTIVE | Noted: 2024-06-30

## 2024-06-30 LAB
ABO + RH BLD: NORMAL
BLOOD GROUP ANTIBODIES SERPL: NORMAL
ERYTHROCYTE [DISTWIDTH] IN BLOOD BY AUTOMATED COUNT: 14.1 % (ref 11.9–14.6)
HCT VFR BLD AUTO: 34.6 % (ref 35.8–46.3)
HGB BLD-MCNC: 11.4 G/DL (ref 11.7–15.4)
MCH RBC QN AUTO: 28.8 PG (ref 26.1–32.9)
MCHC RBC AUTO-ENTMCNC: 32.9 G/DL (ref 31.4–35)
MCV RBC AUTO: 87.4 FL (ref 82–102)
NRBC # BLD: 0 K/UL (ref 0–0.2)
PLATELET # BLD AUTO: 199 K/UL (ref 150–450)
PMV BLD AUTO: 11.8 FL (ref 9.4–12.3)
RBC # BLD AUTO: 3.96 M/UL (ref 4.05–5.2)
SPECIMEN EXP DATE BLD: NORMAL
T PALLIDUM AB SER QL IA: NONREACTIVE
WBC # BLD AUTO: 7.5 K/UL (ref 4.3–11.1)

## 2024-06-30 PROCEDURE — 2580000003 HC RX 258: Performed by: OBSTETRICS & GYNECOLOGY

## 2024-06-30 PROCEDURE — 7100000010 HC PHASE II RECOVERY - FIRST 15 MIN

## 2024-06-30 PROCEDURE — 7100000011 HC PHASE II RECOVERY - ADDTL 15 MIN

## 2024-06-30 PROCEDURE — 00HU33Z INSERTION OF INFUSION DEVICE INTO SPINAL CANAL, PERCUTANEOUS APPROACH: ICD-10-PCS | Performed by: ANESTHESIOLOGY

## 2024-06-30 PROCEDURE — 3700000025 EPIDURAL BLOCK: Performed by: ANESTHESIOLOGY

## 2024-06-30 PROCEDURE — 6360000002 HC RX W HCPCS: Performed by: NURSE ANESTHETIST, CERTIFIED REGISTERED

## 2024-06-30 PROCEDURE — 7220000101 HC DELIVERY VAGINAL/SINGLE

## 2024-06-30 PROCEDURE — 59409 OBSTETRICAL CARE: CPT | Performed by: OBSTETRICS & GYNECOLOGY

## 2024-06-30 PROCEDURE — 6370000000 HC RX 637 (ALT 250 FOR IP): Performed by: OBSTETRICS & GYNECOLOGY

## 2024-06-30 PROCEDURE — 6360000002 HC RX W HCPCS: Performed by: OBSTETRICS & GYNECOLOGY

## 2024-06-30 PROCEDURE — 59200 INSERT CERVICAL DILATOR: CPT

## 2024-06-30 PROCEDURE — 51701 INSERT BLADDER CATHETER: CPT

## 2024-06-30 PROCEDURE — 7210000100 HC LABOR FEE PER 1 HR

## 2024-06-30 PROCEDURE — 1100000000 HC RM PRIVATE

## 2024-06-30 RX ORDER — TRANEXAMIC ACID 10 MG/ML
1000 INJECTION, SOLUTION INTRAVENOUS
Status: ACTIVE | OUTPATIENT
Start: 2024-06-30 | End: 2024-07-01

## 2024-06-30 RX ORDER — IBUPROFEN 800 MG/1
800 TABLET ORAL EVERY 8 HOURS SCHEDULED
Status: DISCONTINUED | OUTPATIENT
Start: 2024-06-30 | End: 2024-07-02 | Stop reason: HOSPADM

## 2024-06-30 RX ORDER — METHYLERGONOVINE MALEATE 0.2 MG/ML
200 INJECTION INTRAVENOUS PRN
Status: DISCONTINUED | OUTPATIENT
Start: 2024-06-30 | End: 2024-07-02 | Stop reason: HOSPADM

## 2024-06-30 RX ORDER — LANOLIN
CREAM (ML) TOPICAL PRN
Status: DISCONTINUED | OUTPATIENT
Start: 2024-06-30 | End: 2024-07-02 | Stop reason: HOSPADM

## 2024-06-30 RX ORDER — FAMOTIDINE 20 MG/1
20 TABLET, FILM COATED ORAL 2 TIMES DAILY PRN
Status: DISCONTINUED | OUTPATIENT
Start: 2024-06-30 | End: 2024-07-02 | Stop reason: HOSPADM

## 2024-06-30 RX ORDER — POLYETHYLENE GLYCOL 3350 17 G/17G
17 POWDER, FOR SOLUTION ORAL DAILY
Status: DISCONTINUED | OUTPATIENT
Start: 2024-06-30 | End: 2024-07-02 | Stop reason: HOSPADM

## 2024-06-30 RX ORDER — NALOXONE HYDROCHLORIDE 0.4 MG/ML
INJECTION, SOLUTION INTRAMUSCULAR; INTRAVENOUS; SUBCUTANEOUS PRN
Status: DISCONTINUED | OUTPATIENT
Start: 2024-06-30 | End: 2024-06-30

## 2024-06-30 RX ORDER — SIMETHICONE 80 MG
80 TABLET,CHEWABLE ORAL EVERY 6 HOURS PRN
Status: DISCONTINUED | OUTPATIENT
Start: 2024-06-30 | End: 2024-07-02 | Stop reason: HOSPADM

## 2024-06-30 RX ORDER — CARBOPROST TROMETHAMINE 250 UG/ML
250 INJECTION, SOLUTION INTRAMUSCULAR PRN
Status: DISCONTINUED | OUTPATIENT
Start: 2024-06-30 | End: 2024-07-02 | Stop reason: HOSPADM

## 2024-06-30 RX ORDER — OXYCODONE HYDROCHLORIDE 5 MG/1
5 TABLET ORAL EVERY 4 HOURS PRN
Status: DISCONTINUED | OUTPATIENT
Start: 2024-06-30 | End: 2024-07-02 | Stop reason: HOSPADM

## 2024-06-30 RX ORDER — ROPIVACAINE HYDROCHLORIDE 2 MG/ML
INJECTION, SOLUTION EPIDURAL; INFILTRATION; PERINEURAL PRN
Status: DISCONTINUED | OUTPATIENT
Start: 2024-06-30 | End: 2024-06-30 | Stop reason: SDUPTHER

## 2024-06-30 RX ORDER — METHYLERGONOVINE MALEATE 0.2 MG/ML
200 INJECTION INTRAVENOUS PRN
Status: DISCONTINUED | OUTPATIENT
Start: 2024-06-30 | End: 2024-06-30 | Stop reason: SDUPTHER

## 2024-06-30 RX ORDER — SODIUM CHLORIDE, SODIUM LACTATE, POTASSIUM CHLORIDE, AND CALCIUM CHLORIDE .6; .31; .03; .02 G/100ML; G/100ML; G/100ML; G/100ML
1000 INJECTION, SOLUTION INTRAVENOUS PRN
Status: DISCONTINUED | OUTPATIENT
Start: 2024-06-30 | End: 2024-06-30

## 2024-06-30 RX ORDER — SODIUM CHLORIDE 0.9 % (FLUSH) 0.9 %
5-40 SYRINGE (ML) INJECTION EVERY 12 HOURS SCHEDULED
Status: DISCONTINUED | OUTPATIENT
Start: 2024-06-30 | End: 2024-07-02 | Stop reason: HOSPADM

## 2024-06-30 RX ORDER — ONDANSETRON 2 MG/ML
4 INJECTION INTRAMUSCULAR; INTRAVENOUS EVERY 6 HOURS PRN
Status: DISCONTINUED | OUTPATIENT
Start: 2024-06-30 | End: 2024-07-02 | Stop reason: HOSPADM

## 2024-06-30 RX ORDER — SODIUM CHLORIDE, SODIUM LACTATE, POTASSIUM CHLORIDE, CALCIUM CHLORIDE 600; 310; 30; 20 MG/100ML; MG/100ML; MG/100ML; MG/100ML
INJECTION, SOLUTION INTRAVENOUS CONTINUOUS
Status: DISCONTINUED | OUTPATIENT
Start: 2024-06-30 | End: 2024-07-02 | Stop reason: HOSPADM

## 2024-06-30 RX ORDER — SODIUM CHLORIDE 9 MG/ML
INJECTION, SOLUTION INTRAVENOUS PRN
Status: DISCONTINUED | OUTPATIENT
Start: 2024-06-30 | End: 2024-07-02 | Stop reason: HOSPADM

## 2024-06-30 RX ORDER — SODIUM CHLORIDE 0.9 % (FLUSH) 0.9 %
5-40 SYRINGE (ML) INJECTION PRN
Status: DISCONTINUED | OUTPATIENT
Start: 2024-06-30 | End: 2024-07-02 | Stop reason: HOSPADM

## 2024-06-30 RX ORDER — LEVOTHYROXINE SODIUM 0.05 MG/1
25 TABLET ORAL DAILY
Status: DISCONTINUED | OUTPATIENT
Start: 2024-07-01 | End: 2024-07-02 | Stop reason: HOSPADM

## 2024-06-30 RX ORDER — ACETAMINOPHEN 500 MG
1000 TABLET ORAL EVERY 8 HOURS SCHEDULED
Status: DISCONTINUED | OUTPATIENT
Start: 2024-06-30 | End: 2024-07-02 | Stop reason: HOSPADM

## 2024-06-30 RX ORDER — MISOPROSTOL 200 UG/1
200 TABLET ORAL PRN
Status: DISCONTINUED | OUTPATIENT
Start: 2024-06-30 | End: 2024-07-02 | Stop reason: HOSPADM

## 2024-06-30 RX ORDER — DEXTROSE, SODIUM CHLORIDE, SODIUM LACTATE, POTASSIUM CHLORIDE, AND CALCIUM CHLORIDE 5; .6; .31; .03; .02 G/100ML; G/100ML; G/100ML; G/100ML; G/100ML
INJECTION, SOLUTION INTRAVENOUS CONTINUOUS
Status: DISCONTINUED | OUTPATIENT
Start: 2024-06-30 | End: 2024-06-30

## 2024-06-30 RX ORDER — MISOPROSTOL 200 UG/1
400 TABLET ORAL PRN
Status: DISCONTINUED | OUTPATIENT
Start: 2024-06-30 | End: 2024-07-02 | Stop reason: HOSPADM

## 2024-06-30 RX ORDER — ONDANSETRON 4 MG/1
4 TABLET, ORALLY DISINTEGRATING ORAL EVERY 6 HOURS PRN
Status: DISCONTINUED | OUTPATIENT
Start: 2024-06-30 | End: 2024-07-02 | Stop reason: HOSPADM

## 2024-06-30 RX ORDER — ONDANSETRON 2 MG/ML
4 INJECTION INTRAMUSCULAR; INTRAVENOUS EVERY 6 HOURS PRN
Status: DISCONTINUED | OUTPATIENT
Start: 2024-06-30 | End: 2024-06-30

## 2024-06-30 RX ORDER — SODIUM CHLORIDE, SODIUM LACTATE, POTASSIUM CHLORIDE, AND CALCIUM CHLORIDE .6; .31; .03; .02 G/100ML; G/100ML; G/100ML; G/100ML
500 INJECTION, SOLUTION INTRAVENOUS PRN
Status: DISCONTINUED | OUTPATIENT
Start: 2024-06-30 | End: 2024-06-30

## 2024-06-30 RX ADMIN — ACETAMINOPHEN 1000 MG: 500 TABLET, FILM COATED ORAL at 18:04

## 2024-06-30 RX ADMIN — ROPIVACAINE HYDROCHLORIDE 8 ML: 2 INJECTION, SOLUTION EPIDURAL; INFILTRATION at 11:30

## 2024-06-30 RX ADMIN — Medication 50 MCG: at 00:16

## 2024-06-30 RX ADMIN — ROPIVACAINE HYDROCHLORIDE 10 ML/HR: 2 INJECTION, SOLUTION EPIDURAL; INFILTRATION at 11:31

## 2024-06-30 RX ADMIN — OXYTOCIN 2 MILLI-UNITS/MIN: 10 INJECTION INTRAVENOUS at 07:50

## 2024-06-30 RX ADMIN — BUTORPHANOL TARTRATE 1 MG: 2 INJECTION, SOLUTION INTRAMUSCULAR; INTRAVENOUS at 05:15

## 2024-06-30 RX ADMIN — SODIUM CHLORIDE, SODIUM LACTATE, POTASSIUM CHLORIDE, CALCIUM CHLORIDE AND DEXTROSE MONOHYDRATE: 5; 600; 310; 30; 20 INJECTION, SOLUTION INTRAVENOUS at 07:53

## 2024-06-30 RX ADMIN — IBUPROFEN 800 MG: 800 TABLET, FILM COATED ORAL at 16:26

## 2024-06-30 RX ADMIN — BUTORPHANOL TARTRATE 1 MG: 2 INJECTION, SOLUTION INTRAMUSCULAR; INTRAVENOUS at 08:52

## 2024-06-30 RX ADMIN — ONDANSETRON 4 MG: 2 INJECTION INTRAMUSCULAR; INTRAVENOUS at 10:40

## 2024-06-30 ASSESSMENT — PAIN SCALES - GENERAL
PAINLEVEL_OUTOF10: 3
PAINLEVEL_OUTOF10: 7
PAINLEVEL_OUTOF10: 6

## 2024-06-30 ASSESSMENT — PAIN DESCRIPTION - DESCRIPTORS
DESCRIPTORS: CRAMPING

## 2024-06-30 ASSESSMENT — PAIN DESCRIPTION - ORIENTATION: ORIENTATION: ANTERIOR;LOWER

## 2024-06-30 ASSESSMENT — PAIN DESCRIPTION - LOCATION
LOCATION: ABDOMEN

## 2024-06-30 NOTE — H&P
Labor Induction Admission Note    Юлия Quintero  [id number]  Estimated Date of Delivery: 24     OB History          4    Para   2    Term   2            AB   1    Living   2         SAB   1    IAB   0    Ectopic        Molar        Multiple        Live Births   2          Obstetric Comments   OB hx:  G1  term  per patient (Dr Carlton) --no records in care everywhere dating back this far.   G2  term  per patient (Dr Carlton) --no records in care everywhere dating back this far  G3 2023 EAB w/ D&C (pt states she did not have an EA B, removed from pregnancy record)  G4 2023 MAB w/ D&C      x 2 -- pt reports \" no issues\" with either pregnancies.  Denies necessitating IOL with either pregnancies  Denies any hx GDM, GHTN/PreE                    Patient admitted with pregnancy at 39w0d for induction of labor due to poor fetal growth (AC improved on last US, CHTN, AMA). Prenatal course was also complicated by depression with SI and maternal obesity. SW has been in close contact with patient throughout the pregnancy offering resources.       Current Facility-Administered Medications   Medication Dose Route Frequency Provider Last Rate Last Admin    butorphanol (STADOL) injection 1 mg  1 mg IntraVENous Q3H PRN Cris Flores L, DO   1 mg at 24 0852    oxytocin (PITOCIN) 30 units in 500 mL infusion  1-20 estella-units/min IntraVENous Continuous Cris Flores L, DO 2 mL/hr at 24 0750 2 estella-units/min at 24 0750    oxytocin (PITOCIN) 30 units in 500 mL infusion Override Pull             dextrose 5 % in lactated ringers infusion   IntraVENous Continuous Cris Flores L,  mL/hr at 24 0753 New Bag at 24 0753    lactated ringers bolus 500 mL  500 mL IntraVENous PRN Cris Flores L, DO        Or    lactated ringers bolus 1,000 mL  1,000 mL IntraVENous PRN Mark Cris L, DO        terbutaline (BRETHINE) injection 0.25 mg  0.25 mg SubCUTAneous

## 2024-06-30 NOTE — PROGRESS NOTES
Pad and panties placed on pt. Gown changed. Pt left leg is still a little numb unable to stand at this time. Pt instructed not to try to get out of bed without nurse

## 2024-06-30 NOTE — PROGRESS NOTES
Delivery Note    Dr Flores arrived to bedside at 1230.      Pt positioned for delivery and set up at 1230.    Spontaneous vaginal delivery of viable boy infant @ 1231.      Apgar's 8-9.    Perineum intact and cleaned. Hip pads changed and peripad applied.    See delivery summary for details.

## 2024-06-30 NOTE — CARE COORDINATION
Initial note:    Chart reviewed for updates. CM met with pt at bedside to complete an initial assessment. Demographics and insurance information confirmed.    Pt has a history of PTSD and depression. She reports that she is currently not on any psychotropic medications. She has a therapist. Pt last appointment with her therapist was in February. She reports that she will be reaching out to her therapist to continue with services. [facility].    Patient given informational packet on  mood & anxiety disorders (resources/education).    Patient denies any additional needs from  at this time.  Patient has 's contact information should any needs/questions arise.    KYLE Alexander

## 2024-06-30 NOTE — PROGRESS NOTES
EPIDURAL PLACEMENT      Dr Rosenthal at bedside at 1119.  JAMES Jc at bedside at 1119    Assisted pt to sitting up on bedside at 1124.    Timeout completed at 1124 with MD, JAMES and myself at bedside.    Test dose given at 1129.  Negative reaction.    Dose given at 1135.    Pt assisted to lying back in left tilt position. [id number]    See anesthesia record for details.  See vital sign flow sheet for BP.    Tolerated procedure well.

## 2024-06-30 NOTE — PROGRESS NOTES
06/30/24 0935   Fetal Heart Rate   Interventions RN at Bedside;Other (Comment)   OB Interventions Comments Pt up to BR

## 2024-06-30 NOTE — L&D DELIVERY NOTE
Branden Quintero [[id number]]      Labor Events     Labor: No   Steroids: None  Cervical Ripening Date/Time:      Cervical Ripening Type: Misoprostol  Antibiotics Received during Labor: No  Rupture Date/Time:  24 10:35:00   Rupture Type: AROM  Fluid Color: Clear  Fluid Odor: None  Fluid Volume: Moderate  Induction: AROM, Oxytocin, Misoprostol  Labor Complications: None              Anesthesia    Method: Epidural       Labor Event Times      Labor onset date/time:        Dilation complete date/time:  24 12:10:00 EDT     Start pushing date/time:     Decision date/time (emergent ):            Delivery Details      Delivery Date: 24 Delivery Time: 12:31:58   Delivery Type: Vaginal, Spontaneous               Presentation    Presentation: Vertex  _: Occiput  _: Anterior       Assisted Delivery Details    Forceps Attempted?: No  Vacuum Extractor Attempted?: No                           Cord                  Placenta    Date/Time: 2024 12:33:30       Lacerations    Episiotomy: None  Perineal Lacerations: None  Other Lacerations: no non-perineal laceration  Number of Repair Packets: 0       Vaginal Counts    Initial Count Personnel: BRANDI  Initial Count Verified By: YAMINI  Intial Sponge Count: Correct Intial Needles Count: Correct Intial Instruments Count: Correct          Blood Loss  Mother: Юлия Quintero #[id number]     Start of Mother's Information      Delivery Blood Loss  24 0031 - 24 1244      None                 End of Mother's Information  Mother: Юлия Quintero #[id number]                Delivery Providers    Delivering clinician: Cris Flores DO     Provider Role    Cris Flores DO Obstetrician    Sylvie Ortiz RN Primary Nurse    Henrik Rosenthal MD Anesthesiologist    Hosea Blount APRN - CRNA Nurse Anesthetist    Erica James RN Nursery Nurse              [place] Assessment    Living Status: Living        Skin

## 2024-06-30 NOTE — PROGRESS NOTES
Mom and baby over to room 447 via W/C. Pt assisted to bed and baby to bassinet in the room with mom. Report given to  Gerri Uribe     Bracelets checked and matched. Care relinquished.

## 2024-07-01 PROCEDURE — 6370000000 HC RX 637 (ALT 250 FOR IP): Performed by: OBSTETRICS & GYNECOLOGY

## 2024-07-01 PROCEDURE — 1100000000 HC RM PRIVATE

## 2024-07-01 RX ORDER — FUROSEMIDE 20 MG/1
20 TABLET ORAL DAILY
Status: DISCONTINUED | OUTPATIENT
Start: 2024-07-01 | End: 2024-07-02 | Stop reason: HOSPADM

## 2024-07-01 RX ADMIN — ACETAMINOPHEN 1000 MG: 500 TABLET, FILM COATED ORAL at 21:27

## 2024-07-01 RX ADMIN — ACETAMINOPHEN 1000 MG: 500 TABLET, FILM COATED ORAL at 12:30

## 2024-07-01 RX ADMIN — IBUPROFEN 800 MG: 800 TABLET, FILM COATED ORAL at 00:25

## 2024-07-01 RX ADMIN — OXYCODONE 5 MG: 5 TABLET ORAL at 03:36

## 2024-07-01 RX ADMIN — IBUPROFEN 800 MG: 800 TABLET, FILM COATED ORAL at 09:29

## 2024-07-01 RX ADMIN — POLYETHYLENE GLYCOL 3350 17 G: 17 POWDER, FOR SOLUTION ORAL at 09:29

## 2024-07-01 RX ADMIN — IBUPROFEN 800 MG: 800 TABLET, FILM COATED ORAL at 17:47

## 2024-07-01 RX ADMIN — FUROSEMIDE 20 MG: 20 TABLET ORAL at 09:29

## 2024-07-01 RX ADMIN — ACETAMINOPHEN 1000 MG: 500 TABLET, FILM COATED ORAL at 03:36

## 2024-07-01 ASSESSMENT — PAIN SCALES - GENERAL
PAINLEVEL_OUTOF10: 3
PAINLEVEL_OUTOF10: 4
PAINLEVEL_OUTOF10: 5

## 2024-07-01 ASSESSMENT — PAIN DESCRIPTION - DESCRIPTORS
DESCRIPTORS: CRAMPING
DESCRIPTORS: CRAMPING;ACHING

## 2024-07-01 ASSESSMENT — PAIN DESCRIPTION - LOCATION
LOCATION: ABDOMEN;BACK
LOCATION: ABDOMEN
LOCATION: ABDOMEN

## 2024-07-01 NOTE — CARE COORDINATION
Consult to SW for mental health history. Patient has been followed by [facility] (Jennifer) throughout her pregnancy and was seen by weekend SW yesterday (Marie) for assessment and provided resources at that time. No needs identified at this time that have not been addressed. Patient has a therapist she intends to follow up with and has contact information for [facility].     Marilyn Dunaway, [facility]  Medical Social Worker  [facility]

## 2024-07-01 NOTE — PROGRESS NOTES
Progress Note                               Patient: Юлия Quintero MRN: [id number]  SSN: [id number]    YOB: 1985  Age: 39 y.o.  Sex: female      Postpartum Day Number 1    Subjective:     Patient doing well postpartum without significant complaints. Voiding without difficulty.  Patient reports normal lochia.  Pain well controlled, voiding on her own without difficulty.       Denies HA, SOB/CP, RUQ pain, Vision changes.      Objective:     Patient Vitals for the past 12 hrs:   Temp Pulse Resp BP SpO2   24 1033 98.4 °F (36.9 °C) 84 16 (!) 97/55 98 %   24 0706 97.4 °F (36.3 °C) 78 16 105/69 97 %   24 0336 98.1 °F (36.7 °C) 81 18 99/60 98 %       Temp (24hrs), Av.9 °F (36.6 °C), Min:97.4 °F (36.3 °C), Max:98.4 °F (36.9 °C)        Intake/Output Summary (Last 24 hours) at 2024 1239  Last data filed at 2024 2215  Gross per 24 hour   Intake --   Output 1350 ml   Net -1350 ml           Physical Exam:    Constitutional: She appears well-developed and well-nourished. No distress.   HENT:    Head: Normocephalic and atraumatic.   Cardiovascular: RRR  Pulmonary/Chest: CTAB  Abd:  S/NTTP/ND, BS normoactive, fundus firm at umbilicus  Ext:  No c/c/e      Lab/Data Review:  Recent Results (from the past 72 hour(s))   CBC    Collection Time: 24 11:28 PM   Result Value Ref Range    WBC 7.5 4.3 - 11.1 K/uL    RBC 3.96 (L) 4.05 - 5.2 M/uL    Hemoglobin 11.4 (L) 11.7 - 15.4 g/dL    Hematocrit 34.6 (L) 35.8 - 46.3 %    MCV 87.4 82.0 - 102.0 FL    MCH 28.8 26.1 - 32.9 PG    MCHC 32.9 31.4 - 35.0 g/dL    RDW 14.1 11.9 - 14.6 %    Platelets 199 150 - 450 K/uL    MPV 11.8 9.4 - 12.3 FL    nRBC 0.00 0.0 - 0.2 K/uL   TYPE AND SCREEN    Collection Time: 24 11:28 PM   Result Value Ref Range    Crossmatch expiration date 2024,2359     ABO/Rh B POSITIVE     Antibody Screen NEG    Treponema Pallidum (Syphilis) Antibody    Collection Time: 24 11:28 PM   Result Value Ref Range

## 2024-07-01 NOTE — LACTATION NOTE
This note was copied from a baby's chart.  In to see mom and infant prior to discharge to home. Mom stated that infant has been latching and nursing well but is sleepy this am. Reviewed the expectations of the first 24 hours as well as the second night. Mom wanted assistance with pumping as well as measuring her nipples: left 22 mm and right 21 mm. Mom stated that the 24 mm is what she has been using and they felt too \"tight\". Initiated the pumping with 27 mm and she stated it felt better. Also reviewed discharged information as well as mom and infant are planning an early discharge to home this pm. Encouraged mom to follow up with lactation consultant as needed.

## 2024-07-02 VITALS
TEMPERATURE: 97.7 F | OXYGEN SATURATION: 98 % | SYSTOLIC BLOOD PRESSURE: 108 MMHG | DIASTOLIC BLOOD PRESSURE: 65 MMHG | HEART RATE: 84 BPM | RESPIRATION RATE: 16 BRPM

## 2024-07-02 PROCEDURE — 6370000000 HC RX 637 (ALT 250 FOR IP): Performed by: OBSTETRICS & GYNECOLOGY

## 2024-07-02 RX ORDER — OXYCODONE HYDROCHLORIDE 5 MG/1
5 TABLET ORAL EVERY 6 HOURS PRN
Qty: 12 TABLET | Refills: 0 | Status: SHIPPED | OUTPATIENT
Start: 2024-07-02 | End: 2024-07-05

## 2024-07-02 RX ORDER — FUROSEMIDE 20 MG/1
20 TABLET ORAL DAILY
Qty: 5 TABLET | Refills: 0 | Status: SHIPPED | OUTPATIENT
Start: 2024-07-03 | End: 2024-07-08

## 2024-07-02 RX ORDER — IBUPROFEN 800 MG/1
800 TABLET ORAL EVERY 8 HOURS SCHEDULED
Qty: 60 TABLET | Refills: 1 | Status: SHIPPED | OUTPATIENT
Start: 2024-07-02

## 2024-07-02 RX ADMIN — IBUPROFEN 800 MG: 800 TABLET, FILM COATED ORAL at 11:36

## 2024-07-02 RX ADMIN — IBUPROFEN 800 MG: 800 TABLET, FILM COATED ORAL at 01:36

## 2024-07-02 RX ADMIN — POLYETHYLENE GLYCOL 3350 17 G: 17 POWDER, FOR SOLUTION ORAL at 17:27

## 2024-07-02 RX ADMIN — ACETAMINOPHEN 1000 MG: 500 TABLET, FILM COATED ORAL at 08:28

## 2024-07-02 RX ADMIN — FUROSEMIDE 20 MG: 20 TABLET ORAL at 08:29

## 2024-07-02 ASSESSMENT — PAIN DESCRIPTION - LOCATION
LOCATION: ABDOMEN

## 2024-07-02 ASSESSMENT — PAIN SCALES - GENERAL
PAINLEVEL_OUTOF10: 5
PAINLEVEL_OUTOF10: 2
PAINLEVEL_OUTOF10: 5

## 2024-07-02 ASSESSMENT — PAIN DESCRIPTION - DESCRIPTORS
DESCRIPTORS: ACHING;CRAMPING
DESCRIPTORS: CRAMPING
DESCRIPTORS: CRAMPING

## 2024-07-02 NOTE — LACTATION NOTE
This note was copied from a baby's chart.  In to follow up with mom and infant prior to discharge to home. Mom stated that infant has been latching and nursing but she also wanted to formula supplement. She also pumped once and expressed zero. Informed mom that this is normal and her volume will increase with the stimulation. Encouraged mom to follow up with lactation consultant as needed.

## 2024-07-02 NOTE — DISCHARGE SUMMARY
Obstetrical Discharge Summary     Name: Юлия Quintero MRN: [id number]  SSN: [id number]    YOB: 1985  Age: 39 y.o.  Sex: female      Allergies: Fluconazole and Propranolol    Admit Date: 6/29/2024    Discharge Date: 7/2/2024     Admitting Physician: Cris Flores DO     Attending Physician:  Cris Flores DO     * Admission Diagnoses: Poor fetal growth affecting management of mother in first trimester, single or unspecified fetus [O36.5910]    * Discharge Diagnoses: Poor fetal growth affecting management of mother in first trimester, single or unspecified fetus [O36.5910]              Additional Diagnoses:   Hospital Problems             Last Modified POA    * (Principal) Poor fetal growth affecting management of mother in first trimester, single or unspecified fetus 6/29/2024 Yes    Encounter for elective induction of labor 6/30/2024 Yes        All lab results for the last 24 hours reviewed.     Immunizations:    Immunization History   Administered Date(s) Administered    COVID-19, PFIZER PURPLE top, DILUTE for use, (age 12 y+), 30mcg/0.3mL 12/28/2020, 01/19/2021    DTP [birth year], 03/26/1987, 05/18/1989, 05/24/1990, 09/11/1991    DTaP, INFANRIX, (age 6w-6y), IM, 0.5mL 09/26/2019    Hepatitis B 09/30/1998, 12/14/1998, 05/20/1999    Hib vaccine 05/18/1989    Influenza Virus Vaccine 10/29/2019, 11/03/2020, 09/28/2021    Influenza, FLUARIX, FLULAVAL, FLUZONE (age 6 mo+) AND AFLURIA, (age 3 y+), PF, 0.5mL 10/29/2019    Influenza, FLUCELVAX, (age 6 mo+), MDCK, PF, 0.5mL 10/17/2022, 11/01/2023    MMR, PRIORIX, M-M-R II, (age 12m+), SC, 0.5mL 02/01/1988, 04/26/1991    Pneumococcal, PPSV23, PNEUMOVAX 23, (age 2y+), SC/IM, 0.5mL 10/19/2020    Polio OPV 1985, 1985, 05/18/1989, 05/24/1990, 09/11/1990    TD 2LF, TDVAX, (age 7y+), IM, 0.5mL 12/14/1998       Group Beta Strep: No components found for: \"OBEXTGRBS\"        Lab Results   Component Value Date/Time    ABORH B

## 2024-07-02 NOTE — CARE COORDINATION
Family has been seen by social workers Marie López and Marilyn Dunaway.    This  will continue to follow through discharge.    Jennifer Lowery, ZAYNAB-ANA, PMH-C  [facility]   [phone]

## 2024-07-02 NOTE — PROGRESS NOTES
Patient discharged to home after ID bands verified and 's code alert removed.  Discharge teaching complete, patient verbalizes understanding; questions encouraged. Pt transported with infant in arms via wheelchair to private vehicle.  Infant placed in car seat correctly.  Stable at discharge.

## 2024-07-02 NOTE — DISCHARGE INSTRUCTIONS
You can also call the Maternal Mental Health Hotline at 1-284-IOC-MAMA (1-232.121.2539) for support. If these mood changes last more than a couple of weeks, talk to your doctor or midwife.  When should you call for help?  Share this information with your partner, family, or a friend. They can help you watch for warning signs.  Call 911  anytime you think you may need emergency care. For example, call if:    You feel you cannot stop from hurting yourself, your baby, or someone else.     You passed out (lost consciousness).     You have chest pain, are short of breath, or cough up blood.     You have a seizure.   Where to get help 24 hours a day, 7 days a week   If you or someone you know talks about suicide, self-harm, a mental health crisis, a substance use crisis, or any other kind of emotional distress, get help right away. You can:    Call the Suicide and Crisis Lifeline at 988.     Call 3-560-247-TALK (1-140.557.6233).     Text HOME to 320567 to access the Crisis Text Line.   Consider saving these numbers in your phone.  Go to [employer] for more information or to chat online.  Call your doctor or midwife now or seek immediate medical care if:    You have signs of hemorrhage (too much bleeding), such as:  Heavy vaginal bleeding. This means that you are soaking through one or more pads in an hour. Or you pass blood clots bigger than an egg.  Feeling dizzy or lightheaded, or you feel like you may faint.  Feeling so tired or weak that you cannot do your usual activities.  A fast or irregular heartbeat.  New or worse belly pain.     You have signs of infection, such as:  A fever.  Increased pain, swelling, warmth, or redness from an incision or wound.  Frequent or painful urination or blood in your urine.  Vaginal discharge that smells bad.  New or worse belly pain.     You have symptoms of a blood clot in your leg (called a deep vein thrombosis), such as:  Pain in the calf, back of the knee, thigh, or

## 2024-08-15 ENCOUNTER — POSTPARTUM VISIT (OUTPATIENT)
Dept: OBGYN CLINIC | Age: 39
End: 2024-08-15

## 2024-08-15 VITALS
SYSTOLIC BLOOD PRESSURE: 104 MMHG | BODY MASS INDEX: 28.16 KG/M2 | WEIGHT: 153 LBS | HEIGHT: 62 IN | DIASTOLIC BLOOD PRESSURE: 76 MMHG

## 2024-08-15 DIAGNOSIS — Z72.89 DELIBERATE SELF-CUTTING: ICD-10-CM

## 2024-08-15 DIAGNOSIS — R45.851 DEPRESSION WITH SUICIDAL IDEATION: ICD-10-CM

## 2024-08-15 DIAGNOSIS — Z86.39 HISTORY OF HYPOTHYROIDISM: ICD-10-CM

## 2024-08-15 DIAGNOSIS — O99.343 ANXIETY DURING PREGNANCY IN THIRD TRIMESTER, ANTEPARTUM: ICD-10-CM

## 2024-08-15 DIAGNOSIS — F41.9 ANXIETY DURING PREGNANCY IN THIRD TRIMESTER, ANTEPARTUM: ICD-10-CM

## 2024-08-15 DIAGNOSIS — F32.A DEPRESSION WITH SUICIDAL IDEATION: ICD-10-CM

## 2024-08-15 NOTE — PROGRESS NOTES
Buster Stratton contractions, does not use inhaler.         History of hypothyroidism 12/04/2023     Overview Note:     Hx Hypothyroid:  Reports no meds since 2021 11/27/23: TSH-0.137, FT4 wnl-1.2. TSH low most likely due to pregnancy; free T4 wnl.  No meds indicated at this time.  Repeat TFTs every trimester   2/11/24: TSH: 0.42  05/20/24 [facility]: TSH- 0.51, FT4- 0.7 on 5/16/24, Primary OB planning to recheck in a few weeks.   06/17/24 [facility]:  Takes Synthroid daily.        Marijuana use during pregnancy 08/28/2023     Overview Note:     States has stopped  Risks to pregnancy discussed  UDS +THC at preg intake (11/27/23)   2/14/24: UDS pos for Barbiturates.      Chronic hypertension affecting pregnancy 08/28/2023     Overview Note:     CHTN:  Diagnosed w/ Dr West approx 3yrs ago   Was on bps meds in the past-- none in the past 1yr   Baseline HELLP labs ___  Baseline PC ratio ___    ASA 162mg    12/29/23 [facility]: BP reassuring  02/23/24 [facility]: BP reassuring  3/29/24 [facility]: BP wnl; 104/66. No meds.      Family history of breast cancer 02/15/2019     Overview Note:     Fam hx Breast CA:  1st cousin w/ breast CA  (maternal) --  diagnosed in late 20's  Maternal great Aunt breast -- ? age  Maternal Aunt w/ bresat-- ?age      Reports she had breast imaging in her 20s -- none since  S/p referral to genetics for counseling and genetic testing (8/3/23) as well as referral for routine screening 3D mammograms         Genital herpes simplex virus (HSV) infection in mother affecting pregnancy 02/15/2019     Overview Note:     Genital HSV:  PT DOES NOT WANT THIS DISCUSSED WITH ANYONE ELSE IN THE ROOM  Takes Valtrex daily for suppression 1000Mg QD per her preference  noted        History of loop electrosurgical excision procedure (LEEP) of cervix affecting pregnancy in third trimester 02/15/2019     Overview Note:     Pt with LEEP for MILLICENT 2 in 2013 -- neg paps since         Problem List Items Addressed This Visit          Other    History of

## 2024-08-29 DIAGNOSIS — Z86.39 HISTORY OF HYPOTHYROIDISM: ICD-10-CM

## 2024-08-29 LAB
T4 FREE SERPL-MCNC: 1.1 NG/DL (ref 0.9–1.7)
TSH W FREE THYROID IF ABNORMAL: 0.85 UIU/ML (ref 0.27–4.2)

## 2024-09-03 ENCOUNTER — APPOINTMENT (OUTPATIENT)
Dept: CT IMAGING | Age: 39
End: 2024-09-03
Payer: MEDICAID

## 2024-09-03 ENCOUNTER — HOSPITAL ENCOUNTER (EMERGENCY)
Age: 39
Discharge: HOME | End: 2024-09-03
Payer: MEDICAID

## 2024-09-03 VITALS
HEIGHT: 62 IN | TEMPERATURE: 98.7 F | OXYGEN SATURATION: 99 % | SYSTOLIC BLOOD PRESSURE: 125 MMHG | DIASTOLIC BLOOD PRESSURE: 94 MMHG | WEIGHT: 150 LBS | HEART RATE: 68 BPM | BODY MASS INDEX: 27.6 KG/M2 | RESPIRATION RATE: 16 BRPM

## 2024-09-03 DIAGNOSIS — R51.9 NONINTRACTABLE HEADACHE, UNSPECIFIED CHRONICITY PATTERN, UNSPECIFIED HEADACHE TYPE: Primary | ICD-10-CM

## 2024-09-03 DIAGNOSIS — E04.1 THYROID NODULE: ICD-10-CM

## 2024-09-03 LAB
ANION GAP SERPL CALC-SCNC: 13 MMOL/L (ref 9–18)
BASOPHILS # BLD: 0 K/UL (ref 0–0.2)
BASOPHILS NFR BLD: 1 % (ref 0–2)
BUN SERPL-MCNC: 7 MG/DL (ref 6–23)
CALCIUM SERPL-MCNC: 9.2 MG/DL (ref 8.3–10.4)
CHLORIDE SERPL-SCNC: 105 MMOL/L (ref 98–107)
CO2 SERPL-SCNC: 22 MMOL/L (ref 21–32)
CREAT SERPL-MCNC: 0.69 MG/DL (ref 0.6–1)
DIFFERENTIAL METHOD BLD: ABNORMAL
EOSINOPHIL # BLD: 0.1 K/UL (ref 0–0.8)
EOSINOPHIL NFR BLD: 2 % (ref 0.5–7.8)
ERYTHROCYTE [DISTWIDTH] IN BLOOD BY AUTOMATED COUNT: 14.7 % (ref 11.9–14.6)
GLUCOSE SERPL-MCNC: 98 MG/DL (ref 65–100)
HCT VFR BLD AUTO: 40 % (ref 35.8–46.3)
HGB BLD-MCNC: 13 G/DL (ref 11.7–15.4)
IMM GRANULOCYTES # BLD AUTO: 0 K/UL (ref 0–0.5)
IMM GRANULOCYTES NFR BLD AUTO: 0 % (ref 0–5)
LYMPHOCYTES # BLD: 1.4 K/UL (ref 0.5–4.6)
LYMPHOCYTES NFR BLD: 26 % (ref 13–44)
MCH RBC QN AUTO: 27.8 PG (ref 26.1–32.9)
MCHC RBC AUTO-ENTMCNC: 32.5 G/DL (ref 31.4–35)
MCV RBC AUTO: 85.7 FL (ref 82–102)
MONOCYTES # BLD: 0.5 K/UL (ref 0.1–1.3)
MONOCYTES NFR BLD: 9 % (ref 4–12)
NEUTS SEG # BLD: 3.3 K/UL (ref 1.7–8.2)
NEUTS SEG NFR BLD: 62 % (ref 43–78)
NRBC # BLD: 0 K/UL (ref 0–0.2)
PLATELET # BLD AUTO: 214 K/UL (ref 150–450)
PMV BLD AUTO: 11.4 FL (ref 9.4–12.3)
POTASSIUM SERPL-SCNC: 3.7 MMOL/L (ref 3.5–5.1)
RBC # BLD AUTO: 4.67 M/UL (ref 4.05–5.2)
SODIUM SERPL-SCNC: 140 MMOL/L (ref 133–143)
WBC # BLD AUTO: 5.2 K/UL (ref 4.3–11.1)

## 2024-09-03 PROCEDURE — 85025 COMPLETE CBC W/AUTO DIFF WBC: CPT

## 2024-09-03 PROCEDURE — 70498 CT ANGIOGRAPHY NECK: CPT

## 2024-09-03 PROCEDURE — 72125 CT NECK SPINE W/O DYE: CPT

## 2024-09-03 PROCEDURE — 80048 BASIC METABOLIC PNL TOTAL CA: CPT

## 2024-09-03 PROCEDURE — 6360000002 HC RX W HCPCS: Performed by: STUDENT IN AN ORGANIZED HEALTH CARE EDUCATION/TRAINING PROGRAM

## 2024-09-03 PROCEDURE — 6360000002 HC RX W HCPCS: Performed by: PHYSICIAN ASSISTANT

## 2024-09-03 PROCEDURE — 96375 TX/PRO/DX INJ NEW DRUG ADDON: CPT

## 2024-09-03 PROCEDURE — 99285 EMERGENCY DEPT VISIT HI MDM: CPT

## 2024-09-03 PROCEDURE — 96374 THER/PROPH/DIAG INJ IV PUSH: CPT

## 2024-09-03 PROCEDURE — 2580000003 HC RX 258: Performed by: PHYSICIAN ASSISTANT

## 2024-09-03 PROCEDURE — 70450 CT HEAD/BRAIN W/O DYE: CPT

## 2024-09-03 PROCEDURE — 6360000004 HC RX CONTRAST MEDICATION: Performed by: PHYSICIAN ASSISTANT

## 2024-09-03 RX ORDER — DEXAMETHASONE SODIUM PHOSPHATE 10 MG/ML
10 INJECTION INTRAMUSCULAR; INTRAVENOUS
Status: COMPLETED | OUTPATIENT
Start: 2024-09-03 | End: 2024-09-03

## 2024-09-03 RX ORDER — IOPAMIDOL 755 MG/ML
100 INJECTION, SOLUTION INTRAVASCULAR
Status: COMPLETED | OUTPATIENT
Start: 2024-09-03 | End: 2024-09-03

## 2024-09-03 RX ORDER — METOCLOPRAMIDE HYDROCHLORIDE 5 MG/ML
10 INJECTION INTRAMUSCULAR; INTRAVENOUS ONCE
Status: COMPLETED | OUTPATIENT
Start: 2024-09-03 | End: 2024-09-03

## 2024-09-03 RX ORDER — 0.9 % SODIUM CHLORIDE 0.9 %
1000 INTRAVENOUS SOLUTION INTRAVENOUS ONCE
Status: COMPLETED | OUTPATIENT
Start: 2024-09-03 | End: 2024-09-03

## 2024-09-03 RX ORDER — METHOCARBAMOL 500 MG/1
500 TABLET, FILM COATED ORAL 3 TIMES DAILY
Qty: 30 TABLET | Refills: 0 | Status: SHIPPED | OUTPATIENT
Start: 2024-09-03 | End: 2024-09-13

## 2024-09-03 RX ORDER — KETOROLAC TROMETHAMINE 15 MG/ML
15 INJECTION, SOLUTION INTRAMUSCULAR; INTRAVENOUS ONCE
Status: COMPLETED | OUTPATIENT
Start: 2024-09-03 | End: 2024-09-03

## 2024-09-03 RX ORDER — PREDNISONE 50 MG/1
50 TABLET ORAL DAILY
Qty: 5 TABLET | Refills: 0 | Status: SHIPPED | OUTPATIENT
Start: 2024-09-03 | End: 2024-09-08

## 2024-09-03 RX ADMIN — IOPAMIDOL 100 ML: 755 INJECTION, SOLUTION INTRAVENOUS at 17:38

## 2024-09-03 RX ADMIN — METOCLOPRAMIDE 10 MG: 5 INJECTION, SOLUTION INTRAMUSCULAR; INTRAVENOUS at 18:01

## 2024-09-03 RX ADMIN — KETOROLAC TROMETHAMINE 15 MG: 15 INJECTION, SOLUTION INTRAMUSCULAR; INTRAVENOUS at 16:24

## 2024-09-03 RX ADMIN — DEXAMETHASONE SODIUM PHOSPHATE 10 MG: 10 INJECTION INTRAMUSCULAR; INTRAVENOUS at 16:24

## 2024-09-03 RX ADMIN — SODIUM CHLORIDE 1000 ML: 9 INJECTION, SOLUTION INTRAVENOUS at 16:23

## 2024-09-03 ASSESSMENT — PAIN SCALES - GENERAL
PAINLEVEL_OUTOF10: 9
PAINLEVEL_OUTOF10: 7

## 2024-09-03 ASSESSMENT — LIFESTYLE VARIABLES
HOW OFTEN DO YOU HAVE A DRINK CONTAINING ALCOHOL: NEVER
HOW MANY STANDARD DRINKS CONTAINING ALCOHOL DO YOU HAVE ON A TYPICAL DAY: PATIENT DOES NOT DRINK

## 2024-09-03 ASSESSMENT — PAIN DESCRIPTION - LOCATION: LOCATION: HEAD

## 2024-09-03 ASSESSMENT — ENCOUNTER SYMPTOMS
RESPIRATORY NEGATIVE: 1
GASTROINTESTINAL NEGATIVE: 1

## 2024-09-03 ASSESSMENT — PAIN - FUNCTIONAL ASSESSMENT: PAIN_FUNCTIONAL_ASSESSMENT: 0-10

## 2024-09-03 NOTE — ED PROVIDER NOTES
Emergency Department Provider Note       PCP: Celeste Barron MD   Age: 39 y.o.   Sex: female     DISPOSITION Decision To Discharge 09/03/2024 07:28:50 PM  Condition at Disposition: Stable       ICD-10-CM    1. Nonintractable headache, unspecified chronicity pattern, unspecified headache type  R51.9       2. Thyroid nodule  E04.1           Medical Decision Making     39-year-old female presents with left-sided headache and neck pain that started Sunday morning.  She thought she just slept weird, but pain has persisted.  She is tender to palpate.  I was initially concerned for shingles, but I do not see a rash.  Discussed with her that her rash could show up for later, and if it does she needs to be seen.  Later she told me that her headache actually started Saturday night during intercourse.  It was not a thunderclap type headache but that was when she did first notice it.  Her initial imaging was negative.  After further discussion and learning that the pain began during intercourse, without any known trauma, with shared decision making, decided to pursue CTA head and neck.  There are no acute abnormalities there.  Patient does have a thyroid nodule that she needs to follow-up and discuss with her doctor.  Her symptoms at this point in time have significantly improved and she is eager to go home.  Return precautions given.     1 or more acute illnesses that pose a threat to life or bodily function.   Prescription drug management performed.  Patient was discharged risks and benefits of hospitalization were considered.  Shared medical decision making was utilized in creating the patients health plan today.    I independently ordered and reviewed each unique test.                     History     39-year-old female 8 weeks postpartum presents with left-sided headache and neck pain that started Sunday morning.  She denies any injury or trauma.  She says it feels like she got hit in the head but did not get

## 2024-09-03 NOTE — ED NOTES
.Patient mobility status  with no difficulty. Provider aware     I have reviewed discharge instructions with the patient.  The patient verbalized understanding.    Patient left ED via Discharge Method: ambulatory to Home with  self .    Opportunity for questions and clarification provided.     Patient given 2 scripts.

## 2024-09-03 NOTE — DISCHARGE INSTRUCTIONS
Call your doctor for close follow up for evaluation of headache and thyroid nodule. Return if worsening.

## 2024-09-03 NOTE — ED TRIAGE NOTES
Patient ambulatory in ED with complaint of left sided head pain. She states symptoms started Sunday and she is unable to turn her head to the side. Patient is wearing sunglasses in room. No known injuries.

## 2024-09-24 ENCOUNTER — OFFICE VISIT (OUTPATIENT)
Dept: OBGYN CLINIC | Age: 39
End: 2024-09-24
Payer: COMMERCIAL

## 2024-09-24 VITALS
HEIGHT: 62 IN | SYSTOLIC BLOOD PRESSURE: 122 MMHG | BODY MASS INDEX: 27.6 KG/M2 | WEIGHT: 150 LBS | DIASTOLIC BLOOD PRESSURE: 82 MMHG

## 2024-09-24 DIAGNOSIS — R21 SKIN RASH: Primary | ICD-10-CM

## 2024-09-24 PROCEDURE — 99213 OFFICE O/P EST LOW 20 MIN: CPT | Performed by: OBSTETRICS & GYNECOLOGY

## 2024-09-24 NOTE — PROGRESS NOTES
Chief Complaint   Patient presents with    Breast Problem     Pt c/o rash under left breast     Pt reports rash under her breast. Has been dealing with pp depression - seeing a therapist and has started medication with a psychiatric provider and declines intervention for this.     LMP: No LMP recorded.  Contraception: planning copper IUD      Allergies   Allergen Reactions    Fluconazole Hives    Propranolol Rash     Current Outpatient Medications   Medication Sig Dispense Refill    Iron Combinations (IRON COMPLEX) CAPS       Ascorbic Acid (VITAMIN C) 500 MG CAPS       Misc. Devices KIT Nature's Blend Prenatal Multivitamin with Minerals ([facility] Baby Box)  NDC: [address]-21;  Take 1 softgel by mouth daily or as instructed by provider;  #qs for 6 months 1 kit 1    valACYclovir (VALTREX) 1 g tablet Take 1 tablet by mouth daily 90 tablet 0    lamoTRIgine (LAMICTAL) 25 MG tablet Take 1 tablet nightly (25 mg) for two weeks, then increase to 2 tablets nightly (50 mg) for two weeks, then increase to 100 mg tablet 42 tablet 0    hydrOXYzine HCl (ATARAX) 25 MG tablet Take 0.5-1 tablets by mouth 2 times daily as needed for Anxiety (sleep) 60 tablet 1     No current facility-administered medications for this visit.     Past Medical History:   Diagnosis Date    Abnormal Pap smear     HGSIL    Acne cystica 10/29/2015    Anemia     Anxiety     Asthma     last exacerbation >10yrs. rescue inhaler prn    Deliberate self-cutting     Depression with suicidal ideation 02/11/2024    Environmental and seasonal allergies 10/26/2023    Patient describes seasonal allergy symptoms despite current Flonase and Allegra.  Will add Singulair.  If not better may require referral to allergist.  She should F/U with her normal PCP Dr. Trujillo as scheduled.      Family history of breast cancer     Goiter diffuse 06/09/2017    liklely hypothyroidism       H/O seasonal allergies     High risk pregnancy, multigravida of advanced maternal age in first

## 2024-09-25 ENCOUNTER — TELEPHONE (OUTPATIENT)
Dept: OBGYN CLINIC | Age: 39
End: 2024-09-25

## 2024-09-25 NOTE — TELEPHONE ENCOUNTER
Called patient to advise on status of Paragard IUD.  Requires PA, we faxed clinicals over 8.27 and received no response.  When I called them this morning they stated they did not receive clinicals.  I re-faxed this morning.    While relaying this information to patient she advised she has a cyst on her uterus and would like to discuss with Dr. Ford the possibility of removing ovaries and/or uterus.  I suggested appt. To discuss with Dr Ford, but since she just had. Appt. Yesterday with provider she declined and asked that I send message to see if she can discuss over phone.

## 2024-09-30 ENCOUNTER — OFFICE VISIT (OUTPATIENT)
Dept: BEHAVIORAL/MENTAL HEALTH CLINIC | Facility: CLINIC | Age: 39
End: 2024-09-30
Payer: MEDICAID

## 2024-09-30 VITALS
OXYGEN SATURATION: 98 % | HEIGHT: 62 IN | SYSTOLIC BLOOD PRESSURE: 106 MMHG | HEART RATE: 82 BPM | DIASTOLIC BLOOD PRESSURE: 68 MMHG | BODY MASS INDEX: 28.3 KG/M2 | WEIGHT: 153.8 LBS

## 2024-09-30 DIAGNOSIS — F60.89 CLUSTER B PERSONALITY DISORDER (HCC): ICD-10-CM

## 2024-09-30 DIAGNOSIS — F41.9 ANXIETY DISORDER, UNSPECIFIED TYPE: ICD-10-CM

## 2024-09-30 DIAGNOSIS — G47.00 INSOMNIA, UNSPECIFIED TYPE: ICD-10-CM

## 2024-09-30 DIAGNOSIS — F33.1 MAJOR DEPRESSIVE DISORDER, RECURRENT, MODERATE (HCC): Primary | ICD-10-CM

## 2024-09-30 PROCEDURE — 90792 PSYCH DIAG EVAL W/MED SRVCS: CPT | Performed by: PSYCHIATRY & NEUROLOGY

## 2024-09-30 RX ORDER — LAMOTRIGINE 25 MG/1
TABLET ORAL
Qty: 42 TABLET | Refills: 0 | Status: SHIPPED | OUTPATIENT
Start: 2024-09-30

## 2024-09-30 RX ORDER — HYDROXYZINE HYDROCHLORIDE 25 MG/1
12.5-25 TABLET, FILM COATED ORAL 2 TIMES DAILY PRN
Qty: 60 TABLET | Refills: 1 | Status: SHIPPED | OUTPATIENT
Start: 2024-09-30 | End: 2024-11-29

## 2024-09-30 ASSESSMENT — ANXIETY QUESTIONNAIRES
6. BECOMING EASILY ANNOYED OR IRRITABLE: SEVERAL DAYS
2. NOT BEING ABLE TO STOP OR CONTROL WORRYING: NEARLY EVERY DAY
7. FEELING AFRAID AS IF SOMETHING AWFUL MIGHT HAPPEN: NOT AT ALL
IF YOU CHECKED OFF ANY PROBLEMS ON THIS QUESTIONNAIRE, HOW DIFFICULT HAVE THESE PROBLEMS MADE IT FOR YOU TO DO YOUR WORK, TAKE CARE OF THINGS AT HOME, OR GET ALONG WITH OTHER PEOPLE: SOMEWHAT DIFFICULT
5. BEING SO RESTLESS THAT IT IS HARD TO SIT STILL: NEARLY EVERY DAY
1. FEELING NERVOUS, ANXIOUS, OR ON EDGE: NEARLY EVERY DAY
4. TROUBLE RELAXING: NEARLY EVERY DAY
3. WORRYING TOO MUCH ABOUT DIFFERENT THINGS: NEARLY EVERY DAY
GAD7 TOTAL SCORE: 16

## 2024-09-30 ASSESSMENT — PATIENT HEALTH QUESTIONNAIRE - PHQ9
9. THOUGHTS THAT YOU WOULD BE BETTER OFF DEAD, OR OF HURTING YOURSELF: SEVERAL DAYS
SUM OF ALL RESPONSES TO PHQ QUESTIONS 1-9: 7
5. POOR APPETITE OR OVEREATING: SEVERAL DAYS
8. MOVING OR SPEAKING SO SLOWLY THAT OTHER PEOPLE COULD HAVE NOTICED. OR THE OPPOSITE, BEING SO FIGETY OR RESTLESS THAT YOU HAVE BEEN MOVING AROUND A LOT MORE THAN USUAL: NOT AT ALL
10. IF YOU CHECKED OFF ANY PROBLEMS, HOW DIFFICULT HAVE THESE PROBLEMS MADE IT FOR YOU TO DO YOUR WORK, TAKE CARE OF THINGS AT HOME, OR GET ALONG WITH OTHER PEOPLE: SOMEWHAT DIFFICULT
4. FEELING TIRED OR HAVING LITTLE ENERGY: SEVERAL DAYS
SUM OF ALL RESPONSES TO PHQ QUESTIONS 1-9: 8
7. TROUBLE CONCENTRATING ON THINGS, SUCH AS READING THE NEWSPAPER OR WATCHING TELEVISION: SEVERAL DAYS
SUM OF ALL RESPONSES TO PHQ QUESTIONS 1-9: 8
3. TROUBLE FALLING OR STAYING ASLEEP: SEVERAL DAYS
1. LITTLE INTEREST OR PLEASURE IN DOING THINGS: SEVERAL DAYS
SUM OF ALL RESPONSES TO PHQ QUESTIONS 1-9: 8
2. FEELING DOWN, DEPRESSED OR HOPELESS: SEVERAL DAYS
SUM OF ALL RESPONSES TO PHQ9 QUESTIONS 1 & 2: 2
6. FEELING BAD ABOUT YOURSELF - OR THAT YOU ARE A FAILURE OR HAVE LET YOURSELF OR YOUR FAMILY DOWN: SEVERAL DAYS

## 2024-09-30 ASSESSMENT — COLUMBIA-SUICIDE SEVERITY RATING SCALE - C-SSRS
2. HAVE YOU ACTUALLY HAD ANY THOUGHTS OF KILLING YOURSELF?: NO
1. WITHIN THE PAST MONTH, HAVE YOU WISHED YOU WERE DEAD OR WISHED YOU COULD GO TO SLEEP AND NOT WAKE UP?: YES
7. DID THIS OCCUR IN THE LAST THREE MONTHS: NO
6. HAVE YOU EVER DONE ANYTHING, STARTED TO DO ANYTHING, OR PREPARED TO DO ANYTHING TO END YOUR LIFE?: NO

## 2024-09-30 NOTE — PROGRESS NOTES
nightly (50 mg) for two weeks, then increase to 100 mg tablet 42 tablet 0    hydrOXYzine HCl (ATARAX) 25 MG tablet Take 0.5-1 tablets by mouth 2 times daily as needed for Anxiety (sleep) 60 tablet 1    SYNTHROID 25 MCG tablet Take 1 tablet by mouth Daily BRAND NAME ONLY (SYNTHROID) (Patient not taking: Reported on 8/15/2024) 30 tablet 3    Iron Combinations (IRON COMPLEX) CAPS       Ascorbic Acid (VITAMIN C) 500 MG CAPS       Misc. Devices KIT Nature's Blend Prenatal Multivitamin with Minerals ([facility] Baby Box)  NDC: [address]-26;  Take 1 softgel by mouth daily or as instructed by provider;  #qs for 6 months 1 kit 1    valACYclovir (VALTREX) 500 MG tablet Take 2 tablets by mouth daily (Patient not taking: Reported on 9/24/2024)       No current facility-administered medications for this visit.       Reviewed and updated this visit by provider:  Tobacco  Allergies  Meds  Problems  Med Hx  Surg Hx  Fam Hx       OBJECTIVE EXAMINATION  Vitals:    09/30/24 1255   BP: 106/68   Pulse: 82   SpO2: 98%     Physical Exam:  General Appearance: Alert, cooperative, in no acute distress, appears stated age  Head: Normocephalic, atraumatic  Eyes: conjunctiva clear, PERRL, EOMI  Ears: Normal structure, intact.  Lungs/Heart: No observed wheezing, respirations unlabored. Normal chest rise.  Musculoskeletal: Normal strength and range of motion. No abnormal movements.    MENTAL STATUS EXAM:  Orientation: Oriented to person, place, time and situation  Appearance: Well groomed, good hygiene  Psychomotor: No slowing or agitation  Speech: Normal rate, rhythm, tone and quantity, no slurring  Mood: labile  Affect: congruent  Thought content: No hallucinations or delusions, paranoia  SI/plan: denies  HI/plan: denies  Thought process: Linear and goal directed  Insight/Judgement: fair / fair  Attention: intact  Memory: grossly intact  Gait: wnl    ASSESSMENT/PLAN:  Юлия Quintero is a 39 y.o. old female who has a psychiatric history

## 2024-10-11 ENCOUNTER — OFFICE VISIT (OUTPATIENT)
Dept: INTERNAL MEDICINE CLINIC | Facility: CLINIC | Age: 39
End: 2024-10-11
Payer: COMMERCIAL

## 2024-10-11 VITALS
OXYGEN SATURATION: 99 % | SYSTOLIC BLOOD PRESSURE: 132 MMHG | WEIGHT: 147.9 LBS | DIASTOLIC BLOOD PRESSURE: 80 MMHG | HEART RATE: 79 BPM | HEIGHT: 62 IN | BODY MASS INDEX: 27.22 KG/M2

## 2024-10-11 DIAGNOSIS — E04.1 THYROID NODULE: ICD-10-CM

## 2024-10-11 DIAGNOSIS — D18.03 LIVER HEMANGIOMA: ICD-10-CM

## 2024-10-11 DIAGNOSIS — A60.09 HERPES GENITALIS IN WOMEN: Primary | ICD-10-CM

## 2024-10-11 PROBLEM — O26.86 PRURITIC URTICARIAL PAPULES AND PLAQUES OF PREGNANCY: Status: RESOLVED | Noted: 2024-06-17 | Resolved: 2024-10-11

## 2024-10-11 PROBLEM — R06.00 DYSPNEA: Status: RESOLVED | Noted: 2024-04-25 | Resolved: 2024-10-11

## 2024-10-11 PROBLEM — O09.523 HIGH RISK PREGNANCY, MULTIGRAVIDA OF ADVANCED MATERNAL AGE IN THIRD TRIMESTER: Status: RESOLVED | Noted: 2023-12-04 | Resolved: 2024-10-11

## 2024-10-11 PROBLEM — O98.319 GENITAL HERPES SIMPLEX VIRUS (HSV) INFECTION IN MOTHER AFFECTING PREGNANCY: Status: RESOLVED | Noted: 2019-02-15 | Resolved: 2024-10-11

## 2024-10-11 PROBLEM — Z34.90 ENCOUNTER FOR ELECTIVE INDUCTION OF LABOR: Status: RESOLVED | Noted: 2024-06-30 | Resolved: 2024-10-11

## 2024-10-11 PROBLEM — O36.5930 POOR FETAL GROWTH AFFECTING MANAGEMENT OF MOTHER IN THIRD TRIMESTER: Status: RESOLVED | Noted: 2024-05-20 | Resolved: 2024-10-11

## 2024-10-11 PROBLEM — O36.5910 POOR FETAL GROWTH AFFECTING MANAGEMENT OF MOTHER IN FIRST TRIMESTER, SINGLE OR UNSPECIFIED FETUS: Status: RESOLVED | Noted: 2024-06-29 | Resolved: 2024-10-11

## 2024-10-11 PROBLEM — R07.89 ATYPICAL CHEST PAIN: Status: RESOLVED | Noted: 2024-04-25 | Resolved: 2024-10-11

## 2024-10-11 PROBLEM — F41.9 ANXIETY DURING PREGNANCY IN THIRD TRIMESTER, ANTEPARTUM: Status: RESOLVED | Noted: 2022-10-17 | Resolved: 2024-10-11

## 2024-10-11 PROBLEM — O99.343 ANXIETY DURING PREGNANCY IN THIRD TRIMESTER, ANTEPARTUM: Status: RESOLVED | Noted: 2022-10-17 | Resolved: 2024-10-11

## 2024-10-11 PROBLEM — O99.320 MARIJUANA USE DURING PREGNANCY: Status: RESOLVED | Noted: 2023-08-28 | Resolved: 2024-10-11

## 2024-10-11 PROBLEM — F12.90 MARIJUANA USE DURING PREGNANCY: Status: RESOLVED | Noted: 2023-08-28 | Resolved: 2024-10-11

## 2024-10-11 PROCEDURE — 99214 OFFICE O/P EST MOD 30 MIN: CPT | Performed by: INTERNAL MEDICINE

## 2024-10-11 RX ORDER — VALACYCLOVIR HYDROCHLORIDE 1 G/1
1000 TABLET, FILM COATED ORAL DAILY
Qty: 90 TABLET | Refills: 0 | Status: SHIPPED | OUTPATIENT
Start: 2024-10-11

## 2024-10-11 SDOH — ECONOMIC STABILITY: FOOD INSECURITY: WITHIN THE PAST 12 MONTHS, THE FOOD YOU BOUGHT JUST DIDN'T LAST AND YOU DIDN'T HAVE MONEY TO GET MORE.: NEVER TRUE

## 2024-10-11 SDOH — ECONOMIC STABILITY: FOOD INSECURITY: WITHIN THE PAST 12 MONTHS, YOU WORRIED THAT YOUR FOOD WOULD RUN OUT BEFORE YOU GOT MONEY TO BUY MORE.: NEVER TRUE

## 2024-10-11 SDOH — ECONOMIC STABILITY: INCOME INSECURITY: HOW HARD IS IT FOR YOU TO PAY FOR THE VERY BASICS LIKE FOOD, HOUSING, MEDICAL CARE, AND HEATING?: NOT HARD AT ALL

## 2024-10-11 ASSESSMENT — ENCOUNTER SYMPTOMS
CONSTIPATION: 0
COUGH: 0
ABDOMINAL PAIN: 0
SHORTNESS OF BREATH: 0
CHEST TIGHTNESS: 0
ABDOMINAL DISTENTION: 0

## 2024-10-11 NOTE — PROGRESS NOTES
Chief Complaint   Patient presents with    Thyroid Problem     Reports swelling in nodules, on going one week.         Юлия Quintero is a 39 y.o. female who presents today for Thyroid Problem (Reports swelling in nodules, on going one week. )     She is here to discuss multiple concerns,  Recently seen in the emergency room with severe headache, neck pain, multiple images were completed including CT scan of the head, and neck, showing no significant abnormalities but incidental findings, and thyroid nodule,    She reports her headache improved, but she continued to notice some swelling in the left side of her neck, she denies any ear pain, and completed therapy given at the emergency room,    She has been also dealing with depression postpartum, has established with psychiatry, and has started taking lamotrigine, she reports starting to feel slightly better,    Is requesting a referral for gastroenterology as her previous gastroenterology office is not covered by her insurance, she was seeing them for hemangioma on the liver, last MRI it was done last year, showing a stable hemangioma at 7 cm,    She also is requesting refill of her valacyclovir, which she takes for genital herpes, 1 g daily      She is no longer taking levothyroxine started during pregnancy    She also has been losing weight, but she reports is due to lack of appetite, but since started medication is starting to feel better,    CT Result (most recent):  CTA HEAD NECK W CONTRAST 09/03/2024    Narrative  EXAMINATION: CTA HEAD NECK W CONTRAST 9/3/2024 5:27 PM    ACCESSION NUMBER: [id number]    COMPARISON: None available    INDICATION: left sided headache    TECHNIQUE: Dedicated contrast enhanced CT of the arteries of the head and neck  was performed with axial images following a timed vascular bolus of 100 mL of  Isovue-370.  Coronal and sagittal reformats are provided.  3-D reconstructions  are provided.    Radiation dose reduction

## 2024-10-30 ENCOUNTER — TELEMEDICINE (OUTPATIENT)
Dept: BEHAVIORAL/MENTAL HEALTH CLINIC | Facility: CLINIC | Age: 39
End: 2024-10-30
Payer: MEDICAID

## 2024-10-30 DIAGNOSIS — F41.9 ANXIETY DISORDER, UNSPECIFIED TYPE: ICD-10-CM

## 2024-10-30 DIAGNOSIS — G47.00 INSOMNIA, UNSPECIFIED TYPE: ICD-10-CM

## 2024-10-30 DIAGNOSIS — F33.1 MAJOR DEPRESSIVE DISORDER, RECURRENT, MODERATE (HCC): Primary | ICD-10-CM

## 2024-10-30 PROCEDURE — 90833 PSYTX W PT W E/M 30 MIN: CPT | Performed by: PSYCHIATRY & NEUROLOGY

## 2024-10-30 PROCEDURE — 99214 OFFICE O/P EST MOD 30 MIN: CPT | Performed by: PSYCHIATRY & NEUROLOGY

## 2024-10-30 RX ORDER — HYDROXYZINE HYDROCHLORIDE 25 MG/1
12.5-25 TABLET, FILM COATED ORAL 2 TIMES DAILY PRN
Qty: 60 TABLET | Refills: 1 | Status: SHIPPED | OUTPATIENT
Start: 2024-10-30 | End: 2024-12-29

## 2024-10-30 RX ORDER — ESCITALOPRAM OXALATE 10 MG/1
10 TABLET ORAL DAILY
Qty: 30 TABLET | Refills: 1 | Status: SHIPPED | OUTPATIENT
Start: 2024-10-30

## 2024-10-30 RX ORDER — LAMOTRIGINE 100 MG/1
100 TABLET ORAL DAILY
Qty: 30 TABLET | Refills: 1 | Status: SHIPPED | OUTPATIENT
Start: 2024-10-30

## 2024-10-30 NOTE — PROGRESS NOTES
PROGRESS NOTE  Patient: Юлия Quintero         Date: 10/30/2024   MR#: [id number]              : 1985  IDENTIFYING INFORMATION: This is a 39 y.o. old female who is a patient whom presents to clinic for follow up evaluation.   Юлия Quintero was evaluated through a synchronous (real-time) audio-video encounter. The patient (or guardian if applicable) is aware that this is a billable service, which includes applicable co-pays. This Virtual Visit was conducted with patient's (and/or legal guardian's) consent. Patient identification was verified, and a caregiver was present when appropriate.   The patient was located at Home: [address] [place] [phone]  Provider was located at Facility (Appt Dept): [address] [place] [phone]  Confirm you are appropriately licensed, registered, or certified to deliver care in the state where the patient is located as indicated above. If you are not or unsure, please re-schedule the visit: Yes, I confirm.     CHIEF COMPLAINT: mood, anxiety  HISTORY OF PRESENT ILLNESS:  Interval History:  Endorses mood is improved. Sleep is more stable. Discussed she wishes to start on an antidepressant. Discussed Zoloft made her suicidal prior. Prozac made her feel emotionless. Lexapro endorses did not work but took as needed. Dicussed taking Lexapro daily in order for it to work and following up in about 6 weeks to test effect. Will increase Lamictal to 100 mg for effect and monitor. Continue Hydroxyzine as needed for sleep and anxiety. Ego lending given. Supportive measures provided.  Current Symptoms  Duration: chronic  Sleep: improved  Appetite: fair  Anxiety: endorses  Mood: labile  Compliance with medications: endorses  Side effects from the medications: SI from SSRI  Current stressors: finances, work, relationship with ex     Memory changes/ADL's if applicable: baseline  Substance History: EtOH: rare Illicit Substances cannabis

## 2024-11-18 ENCOUNTER — TELEPHONE (OUTPATIENT)
Dept: INTERNAL MEDICINE CLINIC | Facility: CLINIC | Age: 39
End: 2024-11-18

## 2024-11-18 NOTE — TELEPHONE ENCOUNTER
Patient has a cough and is wheezing and wants to know if  COVID test can be used? Would like to be seen today and a call from the nurse.

## 2024-11-19 NOTE — TELEPHONE ENCOUNTER
Lmm that pt can used an  Covid Test.  They do work.  Also, if she needs to be seen, she will need to go to Urgent Care.

## 2024-12-04 ENCOUNTER — PATIENT MESSAGE (OUTPATIENT)
Dept: INTERNAL MEDICINE CLINIC | Facility: CLINIC | Age: 39
End: 2024-12-04

## 2024-12-04 DIAGNOSIS — D18.03 LIVER HEMANGIOMA: Primary | ICD-10-CM

## 2024-12-05 ENCOUNTER — OFFICE VISIT (OUTPATIENT)
Dept: OBGYN CLINIC | Age: 39
End: 2024-12-05

## 2024-12-05 VITALS
WEIGHT: 154 LBS | HEIGHT: 62 IN | DIASTOLIC BLOOD PRESSURE: 86 MMHG | SYSTOLIC BLOOD PRESSURE: 132 MMHG | BODY MASS INDEX: 28.34 KG/M2

## 2024-12-05 DIAGNOSIS — Z30.430 ENCOUNTER FOR INSERTION OF PARAGARD IUD: Primary | ICD-10-CM

## 2024-12-05 NOTE — PROGRESS NOTES
Chief Complaint   Patient presents with    Procedure     Paragard IUD Insertion     Pt presents today for copper IUD placement.    Vitals:    12/05/24 1032   BP: 132/86         12/5/2024    10:32 AM 10/11/2024    12:10 PM 9/30/2024    12:55 PM 9/24/2024     3:34 PM 9/3/2024     3:12 PM 8/15/2024     4:00 PM 6/26/2024     4:04 PM   Weight Metrics   Weight 154 lb 147 lb 14.4 oz 153 lb 12.8 oz 150 lb 150 lb 153 lb 185 lb   BMI (Calculated) 28.2 kg/m2 27.1 kg/m2 28.2 kg/m2 27.5 kg/m2 27.5 kg/m2 28 kg/m2 0 kg/m2     Physical Exam  Vitals signs reviewed.   Constitutional:       General: She is not in acute distress.     Appearance: Normal appearance. She is well-developed. She is not ill-appearing, toxic-appearing or diaphoretic.   HENT:      Head: Normocephalic and atraumatic.      Nose: Nose normal.   Eyes:      General: No scleral icterus.     Conjunctiva/sclera: Conjunctivae normal.      Pupils: Pupils are equal, round, and reactive to light.   Neck:      Musculoskeletal: Normal range of motion. No acute abnormality  Pulmonary:      Effort: Pulmonary effort is normal. No respiratory distress.   Abdominal:      No distension.      Palpations: Abdomen is soft. There is no mass.      Tenderness: There is no abdominal tenderness. There is no guarding or rebound.   Genitourinary:     Labia:         Right: No rash, tenderness, lesion or injury.         Left: No rash, tenderness, lesion or injury.       Vagina: Normal. No signs of injury and foreign body. No vaginal discharge, erythema, tenderness or bleeding.      Cervix: No cervical motion tenderness, discharge or friability. +IUD strings are seen extruding through external os as expected at the end of the procedure.      Uterus: Not enlarged and not tender.       Adnexa:         Right: No mass, tenderness or fullness.          Left: No mass, tenderness or fullness.        Comments: External genitalia are normal in appearance. Visual examination of anus and perineum shows

## 2024-12-06 ENCOUNTER — TELEPHONE (OUTPATIENT)
Dept: OBGYN CLINIC | Age: 39
End: 2024-12-06

## 2024-12-06 NOTE — TELEPHONE ENCOUNTER
Answered pt call to office. Pt reports no issues after IUD placement until after going to the bathroom this morning. Now in severe pain/cramping. No strings obviously hanging from vagina per pt. Pt advised to take ibuprofen and tylenol, sched for US/OV with [facility] this morning.

## 2024-12-11 ENCOUNTER — TELEMEDICINE (OUTPATIENT)
Dept: BEHAVIORAL/MENTAL HEALTH CLINIC | Facility: CLINIC | Age: 39
End: 2024-12-11
Payer: MEDICAID

## 2024-12-11 DIAGNOSIS — F33.1 MAJOR DEPRESSIVE DISORDER, RECURRENT, MODERATE (HCC): Primary | ICD-10-CM

## 2024-12-11 DIAGNOSIS — F41.9 ANXIETY DISORDER, UNSPECIFIED TYPE: ICD-10-CM

## 2024-12-11 DIAGNOSIS — G47.00 INSOMNIA, UNSPECIFIED TYPE: ICD-10-CM

## 2024-12-11 PROCEDURE — 90833 PSYTX W PT W E/M 30 MIN: CPT | Performed by: PSYCHIATRY & NEUROLOGY

## 2024-12-11 PROCEDURE — 99214 OFFICE O/P EST MOD 30 MIN: CPT | Performed by: PSYCHIATRY & NEUROLOGY

## 2024-12-11 RX ORDER — LAMOTRIGINE 100 MG/1
100 TABLET ORAL DAILY
Qty: 30 TABLET | Refills: 1 | Status: SHIPPED | OUTPATIENT
Start: 2024-12-11

## 2024-12-11 RX ORDER — ESCITALOPRAM OXALATE 10 MG/1
10 TABLET ORAL DAILY
Qty: 30 TABLET | Refills: 1 | Status: SHIPPED | OUTPATIENT
Start: 2024-12-11

## 2024-12-11 RX ORDER — HYDROXYZINE HYDROCHLORIDE 25 MG/1
12.5-25 TABLET, FILM COATED ORAL 2 TIMES DAILY PRN
Qty: 60 TABLET | Refills: 1 | Status: SHIPPED | OUTPATIENT
Start: 2024-12-11 | End: 2025-02-09

## 2024-12-11 ASSESSMENT — PATIENT HEALTH QUESTIONNAIRE - PHQ9
5. POOR APPETITE OR OVEREATING: NOT AT ALL
SUM OF ALL RESPONSES TO PHQ QUESTIONS 1-9: 0
4. FEELING TIRED OR HAVING LITTLE ENERGY: NOT AT ALL
9. THOUGHTS THAT YOU WOULD BE BETTER OFF DEAD, OR OF HURTING YOURSELF: NOT AT ALL
9. THOUGHTS THAT YOU WOULD BE BETTER OFF DEAD, OR OF HURTING YOURSELF: NOT AT ALL
SUM OF ALL RESPONSES TO PHQ QUESTIONS 1-9: 0
SUM OF ALL RESPONSES TO PHQ QUESTIONS 1-9: 0
7. TROUBLE CONCENTRATING ON THINGS, SUCH AS READING THE NEWSPAPER OR WATCHING TELEVISION: NOT AT ALL
7. TROUBLE CONCENTRATING ON THINGS, SUCH AS READING THE NEWSPAPER OR WATCHING TELEVISION: NOT AT ALL
3. TROUBLE FALLING OR STAYING ASLEEP: NOT AT ALL
1. LITTLE INTEREST OR PLEASURE IN DOING THINGS: NOT AT ALL
SUM OF ALL RESPONSES TO PHQ9 QUESTIONS 1 & 2: 0
2. FEELING DOWN, DEPRESSED OR HOPELESS: NOT AT ALL
6. FEELING BAD ABOUT YOURSELF - OR THAT YOU ARE A FAILURE OR HAVE LET YOURSELF OR YOUR FAMILY DOWN: NOT AT ALL
6. FEELING BAD ABOUT YOURSELF - OR THAT YOU ARE A FAILURE OR HAVE LET YOURSELF OR YOUR FAMILY DOWN: NOT AT ALL
2. FEELING DOWN, DEPRESSED OR HOPELESS: NOT AT ALL
8. MOVING OR SPEAKING SO SLOWLY THAT OTHER PEOPLE COULD HAVE NOTICED. OR THE OPPOSITE, BEING SO FIGETY OR RESTLESS THAT YOU HAVE BEEN MOVING AROUND A LOT MORE THAN USUAL: NOT AT ALL
5. POOR APPETITE OR OVEREATING: NOT AT ALL
8. MOVING OR SPEAKING SO SLOWLY THAT OTHER PEOPLE COULD HAVE NOTICED. OR THE OPPOSITE - BEING SO FIDGETY OR RESTLESS THAT YOU HAVE BEEN MOVING AROUND A LOT MORE THAN USUAL: NOT AT ALL
10. IF YOU CHECKED OFF ANY PROBLEMS, HOW DIFFICULT HAVE THESE PROBLEMS MADE IT FOR YOU TO DO YOUR WORK, TAKE CARE OF THINGS AT HOME, OR GET ALONG WITH OTHER PEOPLE: NOT DIFFICULT AT ALL
4. FEELING TIRED OR HAVING LITTLE ENERGY: NOT AT ALL
SUM OF ALL RESPONSES TO PHQ QUESTIONS 1-9: 0
10. IF YOU CHECKED OFF ANY PROBLEMS, HOW DIFFICULT HAVE THESE PROBLEMS MADE IT FOR YOU TO DO YOUR WORK, TAKE CARE OF THINGS AT HOME, OR GET ALONG WITH OTHER PEOPLE: NOT DIFFICULT AT ALL
3. TROUBLE FALLING OR STAYING ASLEEP: NOT AT ALL
1. LITTLE INTEREST OR PLEASURE IN DOING THINGS: NOT AT ALL
SUM OF ALL RESPONSES TO PHQ QUESTIONS 1-9: 0

## 2024-12-11 ASSESSMENT — ANXIETY QUESTIONNAIRES
7. FEELING AFRAID AS IF SOMETHING AWFUL MIGHT HAPPEN: NOT AT ALL
3. WORRYING TOO MUCH ABOUT DIFFERENT THINGS: SEVERAL DAYS
6. BECOMING EASILY ANNOYED OR IRRITABLE: NOT AT ALL
7. FEELING AFRAID AS IF SOMETHING AWFUL MIGHT HAPPEN: NOT AT ALL
5. BEING SO RESTLESS THAT IT IS HARD TO SIT STILL: NOT AT ALL
3. WORRYING TOO MUCH ABOUT DIFFERENT THINGS: SEVERAL DAYS
4. TROUBLE RELAXING: NOT AT ALL
1. FEELING NERVOUS, ANXIOUS, OR ON EDGE: NOT AT ALL
2. NOT BEING ABLE TO STOP OR CONTROL WORRYING: SEVERAL DAYS
4. TROUBLE RELAXING: NOT AT ALL
6. BECOMING EASILY ANNOYED OR IRRITABLE: NOT AT ALL
2. NOT BEING ABLE TO STOP OR CONTROL WORRYING: SEVERAL DAYS
IF YOU CHECKED OFF ANY PROBLEMS ON THIS QUESTIONNAIRE, HOW DIFFICULT HAVE THESE PROBLEMS MADE IT FOR YOU TO DO YOUR WORK, TAKE CARE OF THINGS AT HOME, OR GET ALONG WITH OTHER PEOPLE: NOT DIFFICULT AT ALL
5. BEING SO RESTLESS THAT IT IS HARD TO SIT STILL: NOT AT ALL
IF YOU CHECKED OFF ANY PROBLEMS ON THIS QUESTIONNAIRE, HOW DIFFICULT HAVE THESE PROBLEMS MADE IT FOR YOU TO DO YOUR WORK, TAKE CARE OF THINGS AT HOME, OR GET ALONG WITH OTHER PEOPLE: NOT DIFFICULT AT ALL
1. FEELING NERVOUS, ANXIOUS, OR ON EDGE: NOT AT ALL
GAD7 TOTAL SCORE: 2

## 2024-12-11 NOTE — PROGRESS NOTES
PROGRESS NOTE  Patient: Юлия Quintero         Date: 2024   MR#: [id number]              : 1985  IDENTIFYING INFORMATION: This is a 39 y.o. old female who is a patient whom presents to clinic for follow up evaluation.   Юлия Quintero was evaluated through a synchronous (real-time) audio-video encounter. The patient (or guardian if applicable) is aware that this is a billable service, which includes applicable co-pays. This Virtual Visit was conducted with patient's (and/or legal guardian's) consent. Patient identification was verified, and a caregiver was present when appropriate.   The patient was located at Home: [address] [place] [phone]  Provider was located at Facility (Appt Dept): [address] [place] [phone]  Confirm you are appropriately licensed, registered, or certified to deliver care in the [facility] where the patient is located as indicated above. If you are not or unsure, please re-schedule the visit: Yes, I confirm.     CHIEF COMPLAINT: mood, anxiety  HISTORY OF PRESENT ILLNESS:  Interval History:  Endorses sleep has been mostly stable. Uses Hydroxyzine as needed. Has been able to tolerate Lexapro and endorses it has helped with mood, anxiety. Continues on Lamictal. Discussed various ongoing stressors. Active listening was utilized. Suggestions and advice were given to help attempt to lessen these stressors. Encouraged coping skills and relaxation methods as well. Ego lending was given. Supportive measures provided. Endorses she is looking into getting another therapist that is trauma focused and will provide more insight.   Current Symptoms  Duration: chronic  Sleep: improved  Appetite: fair  Anxiety: endorses  Mood: labile, pleasant  Compliance with medications: endorses  Side effects from the medications: SI from SSRI  Current stressors: finances, work, relationship with ex     Memory changes/ADL's if applicable:

## 2025-01-03 ENCOUNTER — HOSPITAL ENCOUNTER (EMERGENCY)
Age: 40
Discharge: HOME | End: 2025-01-03
Payer: MEDICAID

## 2025-01-03 ENCOUNTER — APPOINTMENT (OUTPATIENT)
Dept: GENERAL RADIOLOGY | Age: 40
End: 2025-01-03
Payer: MEDICAID

## 2025-01-03 VITALS
TEMPERATURE: 99 F | HEIGHT: 62 IN | BODY MASS INDEX: 27.6 KG/M2 | SYSTOLIC BLOOD PRESSURE: 138 MMHG | WEIGHT: 150 LBS | RESPIRATION RATE: 19 BRPM | OXYGEN SATURATION: 98 % | HEART RATE: 101 BPM | DIASTOLIC BLOOD PRESSURE: 99 MMHG

## 2025-01-03 DIAGNOSIS — R19.7 NAUSEA VOMITING AND DIARRHEA: Primary | ICD-10-CM

## 2025-01-03 DIAGNOSIS — M62.838 SPASM OF MUSCLE: ICD-10-CM

## 2025-01-03 DIAGNOSIS — R11.2 NAUSEA VOMITING AND DIARRHEA: Primary | ICD-10-CM

## 2025-01-03 LAB
ALBUMIN SERPL-MCNC: 4.2 G/DL (ref 3.5–5)
ALBUMIN/GLOB SERPL: 1.3 (ref 1–1.9)
ALP SERPL-CCNC: 71 U/L (ref 35–104)
ALT SERPL-CCNC: 21 U/L (ref 12–65)
ANION GAP SERPL CALC-SCNC: 11 MMOL/L (ref 7–16)
APPEARANCE UR: CLEAR
AST SERPL-CCNC: 18 U/L (ref 15–37)
BACTERIA URNS QL MICRO: ABNORMAL /HPF
BASOPHILS # BLD: 0 K/UL (ref 0–0.2)
BASOPHILS NFR BLD: 0 % (ref 0–2)
BILIRUB SERPL-MCNC: 0.6 MG/DL (ref 0–1.2)
BILIRUB UR QL: ABNORMAL
BUN SERPL-MCNC: 10 MG/DL (ref 6–23)
CALCIUM SERPL-MCNC: 9.2 MG/DL (ref 8.8–10.2)
CHLORIDE SERPL-SCNC: 102 MMOL/L (ref 98–107)
CO2 SERPL-SCNC: 24 MMOL/L (ref 20–29)
COLOR UR: ABNORMAL
CREAT SERPL-MCNC: 0.66 MG/DL (ref 0.8–1.3)
DIFFERENTIAL METHOD BLD: ABNORMAL
EOSINOPHIL # BLD: 0.1 K/UL (ref 0–0.8)
EOSINOPHIL NFR BLD: 1 % (ref 0.5–7.8)
EPI CELLS #/AREA URNS HPF: ABNORMAL /HPF
ERYTHROCYTE [DISTWIDTH] IN BLOOD BY AUTOMATED COUNT: 13.6 % (ref 11.9–14.6)
FLUAV RNA SPEC QL NAA+PROBE: NOT DETECTED
FLUBV RNA SPEC QL NAA+PROBE: NOT DETECTED
GLOBULIN SER CALC-MCNC: 3.2 G/DL (ref 2.3–3.5)
GLUCOSE SERPL-MCNC: 96 MG/DL (ref 65–100)
GLUCOSE UR STRIP.AUTO-MCNC: NEGATIVE MG/DL
HCG UR QL: NEGATIVE
HCT VFR BLD AUTO: 43.4 % (ref 35.8–46.3)
HGB BLD-MCNC: 14.2 G/DL (ref 11.7–15.4)
HGB UR QL STRIP: ABNORMAL
IMM GRANULOCYTES # BLD AUTO: 0 K/UL (ref 0–0.5)
IMM GRANULOCYTES NFR BLD AUTO: 0 % (ref 0–5)
KETONES UR QL STRIP.AUTO: ABNORMAL MG/DL
LEUKOCYTE ESTERASE UR QL STRIP.AUTO: NEGATIVE
LIPASE SERPL-CCNC: 22 U/L (ref 13–60)
LYMPHOCYTES # BLD: 0.6 K/UL (ref 0.5–4.6)
LYMPHOCYTES NFR BLD: 11 % (ref 13–44)
MAGNESIUM SERPL-MCNC: 1.8 MG/DL (ref 1.8–2.4)
MCH RBC QN AUTO: 28 PG (ref 26.1–32.9)
MCHC RBC AUTO-ENTMCNC: 32.7 G/DL (ref 31.4–35)
MCV RBC AUTO: 85.4 FL (ref 82–102)
MONOCYTES # BLD: 0.5 K/UL (ref 0.1–1.3)
MONOCYTES NFR BLD: 8 % (ref 4–12)
MUCOUS THREADS URNS QL MICRO: 0 /LPF
NEUTS SEG # BLD: 4.4 K/UL (ref 1.7–8.2)
NEUTS SEG NFR BLD: 80 % (ref 43–78)
NITRITE UR QL STRIP.AUTO: NEGATIVE
NRBC # BLD: 0 K/UL (ref 0–0.2)
OTHER OBSERVATIONS: ABNORMAL
PH UR STRIP: 5.5 (ref 5–9)
PLATELET # BLD AUTO: 204 K/UL (ref 150–450)
PMV BLD AUTO: 10.4 FL (ref 9.4–12.3)
POTASSIUM SERPL-SCNC: 3.7 MMOL/L (ref 3.5–5.1)
PROT SERPL-MCNC: 7.4 G/DL (ref 6.3–8.2)
PROT UR STRIP-MCNC: ABNORMAL MG/DL
RBC # BLD AUTO: 5.08 M/UL (ref 4.05–5.2)
RBC #/AREA URNS HPF: ABNORMAL /HPF
SARS-COV-2 RDRP RESP QL NAA+PROBE: NOT DETECTED
SODIUM SERPL-SCNC: 137 MMOL/L (ref 133–143)
SOURCE: NORMAL
SP GR UR REFRACTOMETRY: 1.02 (ref 1–1.02)
UROBILINOGEN UR QL STRIP.AUTO: 0.2 EU/DL (ref 0.2–1)
WBC # BLD AUTO: 5.5 K/UL (ref 4.3–11.1)
WBC URNS QL MICRO: ABNORMAL /HPF

## 2025-01-03 PROCEDURE — 96361 HYDRATE IV INFUSION ADD-ON: CPT

## 2025-01-03 PROCEDURE — 80053 COMPREHEN METABOLIC PANEL: CPT

## 2025-01-03 PROCEDURE — 83735 ASSAY OF MAGNESIUM: CPT

## 2025-01-03 PROCEDURE — 87635 SARS-COV-2 COVID-19 AMP PRB: CPT

## 2025-01-03 PROCEDURE — 96374 THER/PROPH/DIAG INJ IV PUSH: CPT

## 2025-01-03 PROCEDURE — 87502 INFLUENZA DNA AMP PROBE: CPT

## 2025-01-03 PROCEDURE — 99284 EMERGENCY DEPT VISIT MOD MDM: CPT

## 2025-01-03 PROCEDURE — 81025 URINE PREGNANCY TEST: CPT

## 2025-01-03 PROCEDURE — 85025 COMPLETE CBC W/AUTO DIFF WBC: CPT

## 2025-01-03 PROCEDURE — 6360000002 HC RX W HCPCS: Performed by: PHYSICIAN ASSISTANT

## 2025-01-03 PROCEDURE — 83690 ASSAY OF LIPASE: CPT

## 2025-01-03 PROCEDURE — 81001 URINALYSIS AUTO W/SCOPE: CPT

## 2025-01-03 PROCEDURE — 74022 RADEX COMPL AQT ABD SERIES: CPT

## 2025-01-03 PROCEDURE — 94762 N-INVAS EAR/PLS OXIMTRY CONT: CPT

## 2025-01-03 PROCEDURE — 2580000003 HC RX 258: Performed by: PHYSICIAN ASSISTANT

## 2025-01-03 RX ORDER — 0.9 % SODIUM CHLORIDE 0.9 %
1000 INTRAVENOUS SOLUTION INTRAVENOUS ONCE
Status: COMPLETED | OUTPATIENT
Start: 2025-01-03 | End: 2025-01-03

## 2025-01-03 RX ORDER — ONDANSETRON 4 MG/1
4 TABLET, ORALLY DISINTEGRATING ORAL 3 TIMES DAILY PRN
Qty: 21 TABLET | Refills: 0 | Status: SHIPPED | OUTPATIENT
Start: 2025-01-03

## 2025-01-03 RX ORDER — METHOCARBAMOL 500 MG/1
500 TABLET, FILM COATED ORAL 3 TIMES DAILY PRN
Qty: 30 TABLET | Refills: 0 | Status: SHIPPED | OUTPATIENT
Start: 2025-01-03 | End: 2025-01-13

## 2025-01-03 RX ORDER — LIDOCAINE 50 MG/G
1 PATCH TOPICAL DAILY
Qty: 30 PATCH | Refills: 0 | Status: SHIPPED | OUTPATIENT
Start: 2025-01-03 | End: 2025-02-02

## 2025-01-03 RX ORDER — DROPERIDOL 2.5 MG/ML
1.25 INJECTION, SOLUTION INTRAMUSCULAR; INTRAVENOUS ONCE
Status: COMPLETED | OUTPATIENT
Start: 2025-01-03 | End: 2025-01-03

## 2025-01-03 RX ADMIN — DROPERIDOL 1.25 MG: 2.5 INJECTION, SOLUTION INTRAMUSCULAR; INTRAVENOUS at 15:27

## 2025-01-03 RX ADMIN — SODIUM CHLORIDE 1000 ML: 9 INJECTION, SOLUTION INTRAVENOUS at 15:28

## 2025-01-03 ASSESSMENT — PAIN SCALES - GENERAL: PAINLEVEL_OUTOF10: 6

## 2025-01-03 ASSESSMENT — PAIN - FUNCTIONAL ASSESSMENT: PAIN_FUNCTIONAL_ASSESSMENT: 0-10

## 2025-01-03 NOTE — DISCHARGE INSTRUCTIONS
The workup was reassuring today.  You do appear to have a viral syndrome.  This will need to run its course.  I did provide nausea medicine and medicine for the cramping/diarrhea.  You also have a spasm to the muscle in your back.  Use warm moist heat, massage and stretching multiple times a day to your back and neck.  I did provide lidocaine patches for this and a muscle relaxer if needed.  Note for work provided.  The main treatment is to make sure you are drinking plenty of fluids to rehydrate.  Return to the ED if worse in any way.

## 2025-01-03 NOTE — ED PROVIDER NOTES
Emergency Department Provider Note       PCP: Celeste Barron MD   Age: 39 y.o.   Sex: female     DISPOSITION Decision To Discharge 01/03/2025 05:11:00 PM   DISPOSITION CONDITION Stable            ICD-10-CM    1. Nausea vomiting and diarrhea  R11.2     R19.7       2. Spasm of muscle  M62.838           Medical Decision Making     Patient is feeling much better and resting comfortably in the room.  No nausea, vomiting or diarrhea while present in the emergency room.  Tolerating p.o.'s.  She was dehydrated.  She did receive 1 L of normal saline while here and is feeling much better.  She has palpable spasm to her rhomboid area on the left.  She was encouraged to use warm moist heat, massage and stretching multiple times a day.  Note for work written today.  Her 6-month-old son is sick with the same.  She looks well, is not having any pain to the abdomen and is stable for discharge at this time.  She was given strict return precautions.  She is stable for discharge and her family is driving her home     1 or more acute illnesses that pose a threat to life or bodily function.   Over the counter drug management performed.  Prescription drug management performed.  Shared medical decision making was utilized in creating the patients health plan today.  Considerations: The following items were considered but not ordered: further evaluation.    I independently ordered and reviewed each unique test.  I reviewed external records: ED visit note from a different ED.    The patients assessment required an independent historian: Sister.  The reason they were needed is important historical information not provided by the patient.                History     Patient is here with nausea and vomiting x 1 that started yesterday and diarrhea multiple times yesterday and today.  Her 6-month-old started with the diarrhea yesterday as well.  He did go to his pediatrician yesterday for his shots.  Patient also is complaining of    No radiographic evidence of acute process in the chest or abdomen.            Electronically signed by VINCE PANCHAL                   Recent Labs     01/03/25  1409   COVID19 Not detected       Voice dictation software was used during the making of this note.  This software is not perfect and grammatical and other typographical errors may be present.  This note has not been completely proofread for errors.     Jaja Morales PA  01/03/25 [id number]

## 2025-01-03 NOTE — ED TRIAGE NOTES
Patient has abdominal pain, vomiting, diarrhea, back pain, and left wrist pain. Vomiting started yesterday. Wednesday was diarrhea. Tuesday abdominal pain started and moved into lower back.  Patient does not remember the last time she peed. Last dose  of tylenol or motrin yesterday.

## 2025-01-03 NOTE — ED NOTES
Patient mobility status  with no difficulty.     I have reviewed discharge instructions with the patient.  The patient verbalized understanding.    Patient left ED via Discharge Method: ambulatory to Home with Extended Family:.    Opportunity for questions and clarification provided.     Patient given 4 scripts.

## 2025-01-07 ENCOUNTER — OFFICE VISIT (OUTPATIENT)
Age: 40
End: 2025-01-07
Payer: MEDICAID

## 2025-01-07 ENCOUNTER — OFFICE VISIT (OUTPATIENT)
Dept: INTERNAL MEDICINE CLINIC | Facility: CLINIC | Age: 40
End: 2025-01-07
Payer: MEDICAID

## 2025-01-07 VITALS
TEMPERATURE: 97.4 F | OXYGEN SATURATION: 98 % | DIASTOLIC BLOOD PRESSURE: 88 MMHG | HEIGHT: 62 IN | SYSTOLIC BLOOD PRESSURE: 110 MMHG | HEART RATE: 81 BPM | WEIGHT: 151.8 LBS | BODY MASS INDEX: 27.94 KG/M2

## 2025-01-07 VITALS
RESPIRATION RATE: 16 BRPM | DIASTOLIC BLOOD PRESSURE: 94 MMHG | SYSTOLIC BLOOD PRESSURE: 135 MMHG | BODY MASS INDEX: 28.52 KG/M2 | HEIGHT: 62 IN | HEART RATE: 81 BPM | WEIGHT: 155 LBS | OXYGEN SATURATION: 99 %

## 2025-01-07 DIAGNOSIS — A60.09 HERPES GENITALIS IN WOMEN: ICD-10-CM

## 2025-01-07 DIAGNOSIS — J01.90 ACUTE RHINOSINUSITIS: Primary | ICD-10-CM

## 2025-01-07 DIAGNOSIS — K64.9 HEMORRHOIDS, UNSPECIFIED HEMORRHOID TYPE: ICD-10-CM

## 2025-01-07 DIAGNOSIS — R06.2 WHEEZING: ICD-10-CM

## 2025-01-07 DIAGNOSIS — D18.03 HEPATIC HEMANGIOMA: Primary | ICD-10-CM

## 2025-01-07 DIAGNOSIS — R21 RASH: ICD-10-CM

## 2025-01-07 PROCEDURE — 99204 OFFICE O/P NEW MOD 45 MIN: CPT | Performed by: INTERNAL MEDICINE

## 2025-01-07 PROCEDURE — 99213 OFFICE O/P EST LOW 20 MIN: CPT | Performed by: NURSE PRACTITIONER

## 2025-01-07 RX ORDER — METHYLPREDNISOLONE SODIUM SUCCINATE 40 MG/ML
40 INJECTION INTRAMUSCULAR; INTRAVENOUS ONCE
Status: COMPLETED | OUTPATIENT
Start: 2025-01-07 | End: 2025-01-07

## 2025-01-07 RX ORDER — VALACYCLOVIR HYDROCHLORIDE 1 G/1
1000 TABLET, FILM COATED ORAL DAILY
Qty: 90 TABLET | Refills: 0 | Status: SHIPPED | OUTPATIENT
Start: 2025-01-07

## 2025-01-07 RX ORDER — PREDNISONE 20 MG/1
TABLET ORAL
Qty: 13 TABLET | Refills: 0 | Status: SHIPPED | OUTPATIENT
Start: 2025-01-07 | End: 2025-01-15

## 2025-01-07 RX ORDER — NYSTATIN AND TRIAMCINOLONE ACETONIDE 100000; 1 [USP'U]/G; MG/G
CREAM TOPICAL
Qty: 60 G | Refills: 0 | Status: SHIPPED | OUTPATIENT
Start: 2025-01-07

## 2025-01-07 RX ADMIN — METHYLPREDNISOLONE SODIUM SUCCINATE 40 MG: 40 INJECTION INTRAMUSCULAR; INTRAVENOUS at 12:27

## 2025-01-07 ASSESSMENT — PATIENT HEALTH QUESTIONNAIRE - PHQ9
SUM OF ALL RESPONSES TO PHQ QUESTIONS 1-9: 0
2. FEELING DOWN, DEPRESSED OR HOPELESS: NOT AT ALL
SUM OF ALL RESPONSES TO PHQ QUESTIONS 1-9: 0
1. LITTLE INTEREST OR PLEASURE IN DOING THINGS: NOT AT ALL
SUM OF ALL RESPONSES TO PHQ9 QUESTIONS 1 & 2: 0
SUM OF ALL RESPONSES TO PHQ QUESTIONS 1-9: 0
SUM OF ALL RESPONSES TO PHQ QUESTIONS 1-9: 0

## 2025-01-07 NOTE — PATIENT INSTRUCTIONS
Complete pending CT scan, if stable then no further imaging indicated unless new concerning symptoms develop in the future.      Hemorrhoidal banding will be scheduled

## 2025-01-07 NOTE — PROGRESS NOTES
Юлия Quintero (:  1985) is a 39 y.o. female new patient referred to our office for evaluation of the following chief complaint(s):  No chief complaint on file.      Assessment & Plan   ASSESSMENT/PLAN:  1) Hepatic hemangioma- 7.3 cm hemangioma centered within hepatic segment 8 seen on imaging from . No symptoms reported at this time. Normal LFTs. Based on imaging would recommend no further surveillance especially if benign, hemangioma suspected.     2) Hemorrhoids- patient has been taking hydrocortisone at home for treatment    Plan   Complete pending CT scan, if stable then no further imaging indicated unless new concerning symptoms develop in the future.      Hemorrhoidal banding will be scheduled    Subjective   SUBJECTIVE/OBJECTIVE  Юлия Quintero is a 39 y.o. year old female referred for evaluation of liver lesion. Patient underwent imaging in  that showed a 6.8 cm liver lesion, most consistent with a hemangioma. Follow up imaging showed this lesion which was rather stable. LFTs have been unremarkable. Patient denies any issues at this time such as weight loss, nausea, vomiting, significant pain, states some rectal bleeding, no changes in bowel habits. No prior EGD or colonoscopy. CT a/p pending. States some issues with hemorrhoids.     Past Medical History:   Diagnosis Date    Abnormal Pap smear     HGSIL    Acne cystica 10/29/2015    Anemia     Anxiety     Asthma     last exacerbation >10yrs. rescue inhaler prn    Deliberate self-cutting     Depression with suicidal ideation 2024    Environmental and seasonal allergies 10/26/2023    Patient describes seasonal allergy symptoms despite current Flonase and Allegra.  Will add Singulair.  If not better may require referral to allergist.  She should F/U with her normal PCP Dr. Trujillo as scheduled.      Family history of breast cancer     Goiter diffuse 2017    liklely hypothyroidism       H/O seasonal allergies     High

## 2025-01-07 NOTE — PROGRESS NOTES
(SOLU-MEDROL) injection 40 mg       Return if symptoms worsen or fail to improve, for as scheduled.    The patient (or guardian, if applicable) and other individuals in attendance with the patient were advised that Artificial Intelligence will be utilized during this visit to record, process the conversation to generate a clinical note, and support improvement of the AI technology. The patient (or guardian, if applicable) and other individuals in attendance at the appointment consented to the use of AI, including the recording.    Julia S Paduano, APRN - CNP

## 2025-01-20 ENCOUNTER — HOSPITAL ENCOUNTER (OUTPATIENT)
Dept: CT IMAGING | Age: 40
Discharge: HOME | End: 2025-01-22
Attending: INTERNAL MEDICINE
Payer: MEDICAID

## 2025-01-20 DIAGNOSIS — D18.03 LIVER HEMANGIOMA: ICD-10-CM

## 2025-01-20 PROCEDURE — 74160 CT ABDOMEN W/CONTRAST: CPT

## 2025-01-20 PROCEDURE — 6360000004 HC RX CONTRAST MEDICATION: Performed by: INTERNAL MEDICINE

## 2025-01-20 RX ORDER — IOPAMIDOL 755 MG/ML
100 INJECTION, SOLUTION INTRAVASCULAR
Status: COMPLETED | OUTPATIENT
Start: 2025-01-20 | End: 2025-01-20

## 2025-01-20 RX ORDER — DIATRIZOATE MEGLUMINE AND DIATRIZOATE SODIUM 660; 100 MG/ML; MG/ML
15 SOLUTION ORAL; RECTAL
Status: DISCONTINUED | OUTPATIENT
Start: 2025-01-20 | End: 2025-01-23 | Stop reason: HOSPADM

## 2025-01-20 RX ADMIN — IOPAMIDOL 100 ML: 755 INJECTION, SOLUTION INTRAVENOUS at 15:13

## 2025-01-20 RX ADMIN — DIATRIZOATE MEGLUMINE AND DIATRIZOATE SODIUM 15 ML: 660; 100 LIQUID ORAL; RECTAL at 15:14

## 2025-01-29 ENCOUNTER — OFFICE VISIT (OUTPATIENT)
Dept: OBGYN CLINIC | Age: 40
End: 2025-01-29

## 2025-01-29 VITALS
SYSTOLIC BLOOD PRESSURE: 128 MMHG | HEIGHT: 62 IN | DIASTOLIC BLOOD PRESSURE: 82 MMHG | BODY MASS INDEX: 27.97 KG/M2 | WEIGHT: 152 LBS

## 2025-01-29 DIAGNOSIS — Z11.3 SCREENING FOR STDS (SEXUALLY TRANSMITTED DISEASES): ICD-10-CM

## 2025-01-29 DIAGNOSIS — N89.8 VAGINAL ODOR: ICD-10-CM

## 2025-01-29 DIAGNOSIS — R10.30 LOWER ABDOMINAL PAIN: ICD-10-CM

## 2025-01-29 DIAGNOSIS — B96.89 BACTERIAL VAGINOSIS: ICD-10-CM

## 2025-01-29 DIAGNOSIS — Z11.8 SCREENING EXAMINATION FOR PARASITIC INFECTION: ICD-10-CM

## 2025-01-29 DIAGNOSIS — N76.0 BACTERIAL VAGINOSIS: ICD-10-CM

## 2025-01-29 DIAGNOSIS — N89.8 VAGINAL DISCHARGE: Primary | ICD-10-CM

## 2025-01-29 RX ORDER — METRONIDAZOLE 500 MG/1
500 TABLET ORAL 2 TIMES DAILY
Qty: 14 TABLET | Refills: 0 | Status: SHIPPED | OUTPATIENT
Start: 2025-01-29 | End: 2025-02-05

## 2025-01-29 NOTE — PROGRESS NOTES
CC:   Chief Complaint   Patient presents with    Vaginal Discharge     Took flagyl -not a full script - wants to make sure everything is cleared up           HPI:    Юлия  is a 39 y.o. , , Patient's last menstrual period was 2025 (approximate).,  who is seen today due to vaginal discharge and odor. The patient states started experiencing a \"clear, thick\" vaginal discharge and \"fishy\" type vaginal odor last week. She states she had \"some Flagyl at home that I started taking because I assumed it was BV since I have gotten in the past\" and states she did take Flagyl last Wednesday (7 days ago), Thursday (6 days ago) and Friday (5 days ago) however, continues to experience symptoms. She also reports experiencing lower abdominal pain that is isolated to the right lower side since IUD placed. She denies fevers, chills, vaginal itching, irritation, urinary frequency, urgency or dysuria today.     She reports changing to Dove ([place]) a few weeks ago and did change back to unscented recently.     Contraception: Paragard IUD (Placed 2024 by Dr. Ford)    States reports having menses since IUD placed. Last menses on 2025       Menses: x 6-7 days, Heavy Flow: She reports wearing super tampons with regular pads. Changing every 3-4 hours.   Denies intermenstrual VB/spotting, Moderate dysmenorrhea (does not need to take any OTC medications)    Sexually active with male partner. Would like STD testing on Nuswab today.   Denies post coital VB or dyspareunia.        GYN HISTORY:  As per HPI     Hx STDs: +HSV, takes daily suppression.   Denies prodromal symptoms today.     Last Pap: 2023-Neg Cotesting  Hx abnormal paps: LEEP for MILLICENT 2 in 2013         Current Outpatient Medications on File Prior to Visit   Medication Sig Dispense Refill    valACYclovir (VALTREX) 1 g tablet Take 1 tablet by mouth daily 90 tablet 0    escitalopram (LEXAPRO) 10 MG tablet Take 1 tablet by mouth daily 30 tablet 1

## 2025-02-02 LAB
A VAGINAE DNA VAG QL NAA+PROBE: NORMAL SCORE
BVAB2 DNA VAG QL NAA+PROBE: NORMAL SCORE
C ALBICANS DNA VAG QL NAA+PROBE: NEGATIVE
C GLABRATA DNA VAG QL NAA+PROBE: NEGATIVE
C TRACH RRNA SPEC QL NAA+PROBE: NEGATIVE
MEGA1 DNA VAG QL NAA+PROBE: NORMAL SCORE
N GONORRHOEA RRNA SPEC QL NAA+PROBE: NEGATIVE
SPECIMEN SOURCE: NORMAL
T VAGINALIS RRNA SPEC QL NAA+PROBE: NEGATIVE

## 2025-02-11 ENCOUNTER — TELEMEDICINE (OUTPATIENT)
Dept: BEHAVIORAL/MENTAL HEALTH CLINIC | Facility: CLINIC | Age: 40
End: 2025-02-11
Payer: MEDICAID

## 2025-02-11 DIAGNOSIS — G47.00 INSOMNIA, UNSPECIFIED TYPE: ICD-10-CM

## 2025-02-11 DIAGNOSIS — F41.9 ANXIETY DISORDER, UNSPECIFIED TYPE: ICD-10-CM

## 2025-02-11 DIAGNOSIS — F33.1 MAJOR DEPRESSIVE DISORDER, RECURRENT, MODERATE (HCC): Primary | ICD-10-CM

## 2025-02-11 PROCEDURE — 99214 OFFICE O/P EST MOD 30 MIN: CPT | Performed by: PSYCHIATRY & NEUROLOGY

## 2025-02-11 RX ORDER — HYDROXYZINE HYDROCHLORIDE 25 MG/1
12.5-25 TABLET, FILM COATED ORAL 2 TIMES DAILY PRN
Qty: 60 TABLET | Refills: 1 | Status: SHIPPED | OUTPATIENT
Start: 2025-02-11 | End: 2025-05-12

## 2025-02-11 RX ORDER — ESCITALOPRAM OXALATE 10 MG/1
10 TABLET ORAL DAILY
Qty: 90 TABLET | Refills: 0 | Status: SHIPPED | OUTPATIENT
Start: 2025-02-11

## 2025-02-11 RX ORDER — LAMOTRIGINE 100 MG/1
100 TABLET ORAL DAILY
Qty: 90 TABLET | Refills: 0 | Status: SHIPPED | OUTPATIENT
Start: 2025-02-11

## 2025-02-11 NOTE — PROGRESS NOTES
PROGRESS NOTE  Patient: Юлия Quintero         Date: 2025   MR#: [id number]              : 1985  IDENTIFYING INFORMATION: This is a 39 y.o. old female who is a patient whom presents to clinic for follow up evaluation.   Юлия Quintero was evaluated through a synchronous (real-time) audio-video encounter. The patient (or guardian if applicable) is aware that this is a billable service, which includes applicable co-pays. This Virtual Visit was conducted with patient's (and/or legal guardian's) consent. Patient identification was verified, and a caregiver was present when appropriate.   The patient was located at Home: [address] [place] [phone]  Provider was located at Facility (Appt Dept): [facility] [place] [address]  Confirm you are appropriately licensed, registered, or certified to deliver care in the state where the patient is located as indicated above. If you are not or unsure, please re-schedule the visit: Yes, I confirm.     CHIEF COMPLAINT: mood, anxiety  HISTORY OF PRESENT ILLNESS:  Interval History:  Continues to have mood and anxiety issues but overall states she has been doing well. Sleep has been stable. Uses Hydroxyzine as needed for anxiety and sleep. Has been using 12.5 mg as needed for anxiety. Feels more tired, fatigued. Encouraged to follow up with PCP. Consider lab work. Continues to take Lexapro and Lamictal. Has started therapy and finds it helpful.   Current Symptoms  Duration: chronic  Sleep: stable  Appetite: fair  Anxiety: endorses  Mood: pleasant  Compliance with medications: endorses  Side effects from the medications: SI from SSRI?  Current stressors: finances, work, relationship with ex     Memory changes/ADL's if applicable: baseline  Substance History: EtOH: rare Illicit Substances cannabis couple times per month  Family History: mother - depression, anxiety  Social History: never ,   Lives with/Support from:  Statement Selected

## 2025-02-12 ENCOUNTER — PROCEDURE VISIT (OUTPATIENT)
Dept: OBGYN CLINIC | Age: 40
End: 2025-02-12
Payer: MEDICAID

## 2025-02-12 ENCOUNTER — OFFICE VISIT (OUTPATIENT)
Dept: OBGYN CLINIC | Age: 40
End: 2025-02-12
Payer: MEDICAID

## 2025-02-12 VITALS
WEIGHT: 152 LBS | HEIGHT: 62 IN | SYSTOLIC BLOOD PRESSURE: 124 MMHG | BODY MASS INDEX: 27.97 KG/M2 | DIASTOLIC BLOOD PRESSURE: 75 MMHG

## 2025-02-12 DIAGNOSIS — Z30.431 IUD CHECK UP: ICD-10-CM

## 2025-02-12 DIAGNOSIS — Z30.2 ENCOUNTER FOR FEMALE STERILIZATION PROCEDURE: ICD-10-CM

## 2025-02-12 DIAGNOSIS — R10.30 LOWER ABDOMINAL PAIN: Primary | ICD-10-CM

## 2025-02-12 DIAGNOSIS — Z97.5 ATTEMPTED IUD REMOVAL, UNSUCCESSFUL: ICD-10-CM

## 2025-02-12 DIAGNOSIS — Z53.8 ATTEMPTED IUD REMOVAL, UNSUCCESSFUL: ICD-10-CM

## 2025-02-12 PROCEDURE — 76830 TRANSVAGINAL US NON-OB: CPT | Performed by: OBSTETRICS & GYNECOLOGY

## 2025-02-12 PROCEDURE — 99213 OFFICE O/P EST LOW 20 MIN: CPT | Performed by: NURSE PRACTITIONER

## 2025-02-12 PROCEDURE — 99459 PELVIC EXAMINATION: CPT | Performed by: NURSE PRACTITIONER

## 2025-02-12 SDOH — ECONOMIC STABILITY: FOOD INSECURITY: WITHIN THE PAST 12 MONTHS, THE FOOD YOU BOUGHT JUST DIDN'T LAST AND YOU DIDN'T HAVE MONEY TO GET MORE.: NEVER TRUE

## 2025-02-12 SDOH — ECONOMIC STABILITY: FOOD INSECURITY: WITHIN THE PAST 12 MONTHS, YOU WORRIED THAT YOUR FOOD WOULD RUN OUT BEFORE YOU GOT MONEY TO BUY MORE.: NEVER TRUE

## 2025-02-12 NOTE — PROGRESS NOTES
Юлия Quintero is a 39 y.o.  who is here discuss U/S results which was performed for right lower side abdominal pain. The patient was seen by me on  due to above symptoms. See previous note by me for full history. In short, The patient states started experiencing a \"clear, thick\" vaginal discharge and \"fishy\" type vaginal odor last week. She states she had \"some Flagyl at home that I started taking because I assumed it was BV since I have gotten in the past\" and states she did take Flagyl last Wednesday (7 days ago), Thursday (6 days ago) and Friday (5 days ago) however, continues to experience symptoms. She also reports experiencing lower abdominal pain that is isolated to the right lower side since IUD placed. She denied fevers, chills, vaginal itching, irritation, urinary frequency, urgency or dysuria during that visit.      Labs collected at previous visit:   Nuswab plus neg for yeast, BV and STIs.     Results/imaging: Anteverted, enlarged bulky heterogenous UT with prominent anterior arcuate vasculature (V=233.105)   Both adnexae vasculature prominent also L>R  Endometrium=10.5mm, no IUD seen in Fundus  Right ovary-appears wnl (V=5.549)  Left ovary-enlarged with hyperechoic areas in stroma, possible scarring? (V=27.889)  No free fluid seen  Cervix-the body of the IUD is in cervix with wing above the internal os tilted to the left  Dr. Ford also reviewed u/s findings today      Since previous visit, the patient continues to report lower abdominal pain. States \"the pain is no longer on the right lower side, I feel it on both sides now.\"      Patient's last menstrual period was 2025 (approximate).          ROS:  Review of Systems   Constitutional: Negative for chills, fever and weight loss.   HENT: Negative for hearing loss.   Eyes: Negative for blurred vision and double vision.   Respiratory: Negative for cough, hemoptysis and shortness of breath.   Cardiovascular: Negative for

## 2025-02-18 ENCOUNTER — OFFICE VISIT (OUTPATIENT)
Dept: OBGYN CLINIC | Age: 40
End: 2025-02-18
Payer: MEDICAID

## 2025-02-18 VITALS
BODY MASS INDEX: 28.71 KG/M2 | HEIGHT: 62 IN | SYSTOLIC BLOOD PRESSURE: 118 MMHG | DIASTOLIC BLOOD PRESSURE: 80 MMHG | WEIGHT: 156 LBS

## 2025-02-18 DIAGNOSIS — Z30.432 ENCOUNTER FOR IUD REMOVAL: Primary | ICD-10-CM

## 2025-02-18 DIAGNOSIS — A60.09 HERPES GENITALIS IN WOMEN: ICD-10-CM

## 2025-02-18 PROCEDURE — 58301 REMOVE INTRAUTERINE DEVICE: CPT | Performed by: OBSTETRICS & GYNECOLOGY

## 2025-02-18 RX ORDER — VALACYCLOVIR HYDROCHLORIDE 1 G/1
1000 TABLET, FILM COATED ORAL DAILY
Qty: 90 TABLET | Refills: 3 | Status: SHIPPED | OUTPATIENT
Start: 2025-02-18

## 2025-02-18 RX ORDER — ETONOGESTREL AND ETHINYL ESTRADIOL VAGINAL RING .015; .12 MG/D; MG/D
1 RING VAGINAL
Qty: 3 EACH | Refills: 4 | Status: SHIPPED | OUTPATIENT
Start: 2025-02-18

## 2025-02-18 NOTE — PROGRESS NOTES
Chief Complaint   Patient presents with    IUD Removal       LMP: Patient's last menstrual period was 01/17/2025 (approximate).  Contraception: IUD        Allergies   Allergen Reactions    Fluconazole Hives    Propranolol Rash     Current Outpatient Medications   Medication Sig Dispense Refill    valACYclovir (VALTREX) 1 g tablet Take 1 tablet by mouth daily 90 tablet 3    etonogestrel-ethinyl estradiol (NUVARING) 0.12-0.015 MG/24HR vaginal ring Place 1 each vaginally every 21 days Insert one (1) ring vaginally and leave in place for three (3) weeks, then remove for one (1) week. 3 each 4    lamoTRIgine (LAMICTAL) 100 MG tablet Take 1 tablet by mouth daily 90 tablet 0    escitalopram (LEXAPRO) 10 MG tablet Take 1 tablet by mouth daily 90 tablet 0    hydrOXYzine HCl (ATARAX) 25 MG tablet Take 0.5-1 tablets by mouth 2 times daily as needed for Anxiety (sleep) 60 tablet 1    ondansetron (ZOFRAN-ODT) 4 MG disintegrating tablet Take 1 tablet by mouth 3 times daily as needed for Nausea or Vomiting 21 tablet 0    Ascorbic Acid (VITAMIN C) 500 MG CAPS       Misc. Devices KIT Nature's Blend Prenatal Multivitamin with Minerals ([facility] Baby Box)  NDC: [address]-12;  Take 1 softgel by mouth daily or as instructed by provider;  #qs for 6 months 1 kit 1     No current facility-administered medications for this visit.     Past Medical History:   Diagnosis Date    Abnormal Pap smear     HGSIL    Acne cystica 10/29/2015    Anemia     Anxiety     Asthma     last exacerbation >10yrs. rescue inhaler prn    Deliberate self-cutting     Depression with suicidal ideation 02/11/2024    Environmental and seasonal allergies 10/26/2023    Patient describes seasonal allergy symptoms despite current Flonase and Allegra.  Will add Singulair.  If not better may require referral to allergist.  She should F/U with her normal PCP Dr. Trujillo as scheduled.      Family history of breast cancer     Goiter diffuse 06/09/2017    liklely hypothyroidism

## 2025-03-28 ENCOUNTER — OFFICE VISIT (OUTPATIENT)
Dept: INTERNAL MEDICINE CLINIC | Facility: CLINIC | Age: 40
End: 2025-03-28
Payer: MEDICAID

## 2025-03-28 VITALS
WEIGHT: 156 LBS | HEART RATE: 92 BPM | BODY MASS INDEX: 28.71 KG/M2 | OXYGEN SATURATION: 98 % | HEIGHT: 62 IN | DIASTOLIC BLOOD PRESSURE: 72 MMHG | SYSTOLIC BLOOD PRESSURE: 100 MMHG

## 2025-03-28 DIAGNOSIS — M65.4 DE QUERVAIN'S TENOSYNOVITIS, LEFT: Primary | ICD-10-CM

## 2025-03-28 PROCEDURE — 99214 OFFICE O/P EST MOD 30 MIN: CPT | Performed by: INTERNAL MEDICINE

## 2025-03-28 ASSESSMENT — ENCOUNTER SYMPTOMS
EYE PAIN: 0
VOICE CHANGE: 0
RECTAL PAIN: 0
STRIDOR: 0

## 2025-03-28 NOTE — PROGRESS NOTES
FOLLOWUP VISIT    Subjective:    Ms. Quintero is a 39 y.o., female,   Chief Complaint   Patient presents with    Wrist Pain     Wants to be checked for RA.        HPI:    This patient is a 39 year old female who has child age 6-12 months.  She has to repeatedly lift and carry the baby all day.  She developed pain involving the left wrist proximal to the base of her thumb worse with repetitive wrist use.  She went to [facility] orthopedic clinic.  2/25/25 left wrist X-rays normal.  She was diagnosed with DeQuervain's tenosynovitis.  She was offered but declined a steroid injection.  They recommended she wear a thumb spica splint, rest, PT, and OTC NSAIDs PRN.  She found the splint uncomfortable and has not been wearing it.  She has not been able to limit her activities because someone has to lift and carry the baby.  She does not want to take oral NSAIDs for fear of side effects and she did not schedule PT.  After discussing her wrist pain with her cousin she presents today requesting blood tests for lupus and rheumatoid arthritis.      The following portions of the patient's history were reviewed and updated as appropriate:      Past Medical History:   Diagnosis Date    Abnormal Pap smear     HGSIL    Acne cystica 10/29/2015    Anemia     Anxiety     Asthma     last exacerbation >10yrs. rescue inhaler prn    Deliberate self-cutting     Depression with suicidal ideation 02/11/2024    Environmental and seasonal allergies 10/26/2023    Patient describes seasonal allergy symptoms despite current Flonase and Allegra.  Will add Singulair.  If not better may require referral to allergist.  She should F/U with her normal PCP Dr. Trujillo as scheduled.      Family history of breast cancer     Goiter diffuse 06/09/2017    liklely hypothyroidism       H/O seasonal allergies     High risk pregnancy, multigravida of advanced maternal age in first trimester 12/04/2023    AMA:  MFM referral   Gentetics discussed -- pt

## 2025-04-08 ENCOUNTER — PATIENT MESSAGE (OUTPATIENT)
Dept: INTERNAL MEDICINE CLINIC | Facility: CLINIC | Age: 40
End: 2025-04-08

## 2025-04-15 ENCOUNTER — OFFICE VISIT (OUTPATIENT)
Dept: INTERNAL MEDICINE CLINIC | Facility: CLINIC | Age: 40
End: 2025-04-15
Payer: MEDICAID

## 2025-04-15 VITALS
BODY MASS INDEX: 26.43 KG/M2 | WEIGHT: 143.6 LBS | DIASTOLIC BLOOD PRESSURE: 72 MMHG | HEART RATE: 79 BPM | TEMPERATURE: 97.3 F | OXYGEN SATURATION: 98 % | SYSTOLIC BLOOD PRESSURE: 110 MMHG | HEIGHT: 62 IN

## 2025-04-15 DIAGNOSIS — Z12.31 VISIT FOR SCREENING MAMMOGRAM: ICD-10-CM

## 2025-04-15 DIAGNOSIS — Z00.00 ENCOUNTER FOR WELL ADULT EXAM WITHOUT ABNORMAL FINDINGS: ICD-10-CM

## 2025-04-15 DIAGNOSIS — E03.9 ACQUIRED HYPOTHYROIDISM: ICD-10-CM

## 2025-04-15 DIAGNOSIS — Z00.00 ENCOUNTER FOR WELL ADULT EXAM WITHOUT ABNORMAL FINDINGS: Primary | ICD-10-CM

## 2025-04-15 DIAGNOSIS — Z13.0 ENCOUNTER FOR SICKLE-CELL SCREENING: ICD-10-CM

## 2025-04-15 LAB
ALBUMIN SERPL-MCNC: 4.3 G/DL (ref 3.5–5)
ALBUMIN/GLOB SERPL: 1.3 (ref 1–1.9)
ALP SERPL-CCNC: 68 U/L (ref 35–104)
ALT SERPL-CCNC: 55 U/L (ref 8–45)
ANION GAP SERPL CALC-SCNC: 13 MMOL/L (ref 7–16)
AST SERPL-CCNC: 19 U/L (ref 15–37)
BASOPHILS # BLD: 0.04 K/UL (ref 0–0.2)
BASOPHILS NFR BLD: 0.4 % (ref 0–2)
BILIRUB SERPL-MCNC: 0.3 MG/DL (ref 0–1.2)
BUN SERPL-MCNC: 13 MG/DL (ref 6–23)
CALCIUM SERPL-MCNC: 9.7 MG/DL (ref 8.8–10.2)
CHLORIDE SERPL-SCNC: 105 MMOL/L (ref 98–107)
CHOLEST SERPL-MCNC: 218 MG/DL (ref 0–200)
CO2 SERPL-SCNC: 23 MMOL/L (ref 20–29)
CREAT SERPL-MCNC: 0.76 MG/DL (ref 0.6–1.1)
DIFFERENTIAL METHOD BLD: ABNORMAL
EOSINOPHIL # BLD: 0.12 K/UL (ref 0–0.8)
EOSINOPHIL NFR BLD: 1.2 % (ref 0.5–7.8)
ERYTHROCYTE [DISTWIDTH] IN BLOOD BY AUTOMATED COUNT: 14.3 % (ref 11.9–14.6)
GLOBULIN SER CALC-MCNC: 3.4 G/DL (ref 2.3–3.5)
GLUCOSE SERPL-MCNC: 94 MG/DL (ref 70–99)
HCT VFR BLD AUTO: 44.3 % (ref 35.8–46.3)
HDLC SERPL-MCNC: 58 MG/DL (ref 40–60)
HDLC SERPL: 3.8 (ref 0–5)
HGB BLD-MCNC: 13.9 G/DL (ref 11.7–15.4)
IMM GRANULOCYTES # BLD AUTO: 0.04 K/UL (ref 0–0.5)
IMM GRANULOCYTES NFR BLD AUTO: 0.4 % (ref 0–5)
LDLC SERPL CALC-MCNC: 145 MG/DL (ref 0–100)
LYMPHOCYTES # BLD: 1.23 K/UL (ref 0.5–4.6)
LYMPHOCYTES NFR BLD: 12.7 % (ref 13–44)
MCH RBC QN AUTO: 27.6 PG (ref 26.1–32.9)
MCHC RBC AUTO-ENTMCNC: 31.4 G/DL (ref 31.4–35)
MCV RBC AUTO: 87.9 FL (ref 82–102)
MONOCYTES # BLD: 0.45 K/UL (ref 0.1–1.3)
MONOCYTES NFR BLD: 4.6 % (ref 4–12)
NEUTS SEG # BLD: 7.83 K/UL (ref 1.7–8.2)
NEUTS SEG NFR BLD: 80.7 % (ref 43–78)
NRBC # BLD: 0 K/UL (ref 0–0.2)
PLATELET # BLD AUTO: 241 K/UL (ref 150–450)
PMV BLD AUTO: 11.9 FL (ref 9.4–12.3)
POTASSIUM SERPL-SCNC: 4 MMOL/L (ref 3.5–5.1)
PROT SERPL-MCNC: 7.6 G/DL (ref 6.3–8.2)
RBC # BLD AUTO: 5.04 M/UL (ref 4.05–5.2)
SODIUM SERPL-SCNC: 142 MMOL/L (ref 136–145)
T4 FREE SERPL-MCNC: 1.1 NG/DL (ref 0.9–1.7)
TRIGL SERPL-MCNC: 78 MG/DL (ref 0–150)
TSH, 3RD GENERATION: 0.5 UIU/ML (ref 0.27–4.2)
VLDLC SERPL CALC-MCNC: 16 MG/DL (ref 6–23)
WBC # BLD AUTO: 9.7 K/UL (ref 4.3–11.1)

## 2025-04-15 PROCEDURE — 99395 PREV VISIT EST AGE 18-39: CPT | Performed by: NURSE PRACTITIONER

## 2025-04-15 RX ORDER — LEVOTHYROXINE SODIUM 25 UG/1
25 TABLET ORAL DAILY
COMMUNITY

## 2025-04-15 ASSESSMENT — ENCOUNTER SYMPTOMS
ABDOMINAL PAIN: 0
BACK PAIN: 0
CONSTIPATION: 0
SINUS PAIN: 0
EYE REDNESS: 0
SHORTNESS OF BREATH: 0
WHEEZING: 0
ABDOMINAL DISTENTION: 0
VOMITING: 0
COUGH: 0
COLOR CHANGE: 0
NAUSEA: 0
EYE PAIN: 0
SINUS PRESSURE: 0
DIARRHEA: 0
BLOOD IN STOOL: 0

## 2025-04-15 NOTE — PROGRESS NOTES
Well Adult Note  Name: Юлия Quintero Today’s Date: 4/15/2025   MRN: [id number] Sex: Female   Age: 39 y.o. Ethnicity:  /    : 1985 Race:  /       Юлия Quintero is here for a well adult exam.          Assessment & Plan  1. Annual physical examination.  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  - A PPD test will be conducted when she gets closer to clinical.  - She has been advised to provide any necessary forms for completion.  - If additional tests or titers are required by the school, she will inform us so that appropriate orders can be placed.    2. Thyroid disorder.  - She reports experiencing throbbing and discomfort in her thyroid area, which she attributes to stress.  - She has been taking levothyroxine 25 mcg as needed.  - Thyroid function tests, including TSH and T4, will be conducted today to assist Dr. Farrra in his evaluation at the end of the month.  - She has an appointment with the endocrinologist on 2025.    3. Sickle cell trait screening.  - A sickle cell trait test will be conducted today as requested by the patient.  - She has discussed the need for this test due to her daughter's sickle cell trait.  - The results may take 3 to 5 days to return.  - She will be contacted with the results once available.    4. Screening mammogram.  - order placed -will schedule for next month when she turns 41.    Encounter for well adult exam without abnormal findings  -     Comprehensive Metabolic Panel; Future  -     CBC with Auto Differential; Future  -     Lipid Panel; Future  Acquired hypothyroidism  -     TSH; Future  -     T4, Free; Future  Encounter for sickle-cell screening  -     Sickle Cell Screen; Future  Visit for screening mammogram  -     GHASSAN KYLER DIGITAL SCREEN BILATERAL; Future  Results         Return if symptoms worsen or fail to improve, for Dr. Trujillo.     Subjective   History of Present

## 2025-04-15 NOTE — PATIENT INSTRUCTIONS
Well Visit, Ages 18 to 65: Care Instructions  Well visits can help you stay healthy. Your doctor has checked your overall health and may have suggested ways to take good care of yourself. Your doctor also may have recommended tests. You can help prevent illness with healthy eating, good sleep, vaccinations, regular exercise, and other steps.    Get the tests that you and your doctor decide on. Depending on your age and risks, examples might include screening for diabetes; hepatitis C; HIV; and cervical, breast, lung, and colon cancer. Screening helps find diseases before any symptoms appear.   Eat healthy foods. Choose fruits, vegetables, whole grains, lean protein, and low-fat dairy foods. Limit saturated fat and reduce salt.     Limit alcohol. Men should have no more than 2 drinks a day. Women should have no more than 1. For some people, no alcohol is the best choice.   Exercise. Get at least 30 minutes of exercise on most days of the week. Walking can be a good choice.     Reach and stay at your healthy weight. This will lower your risk for many health problems.   Take care of your mental health. Try to stay connected with friends, family, and community, and find ways to manage stress.     If you're feeling depressed or hopeless, talk to someone. A counselor can help. If you don't have a counselor, talk to your doctor.   Talk to your doctor if you think you may have a problem with alcohol or drug use. This includes prescription medicines, marijuana, and other drugs.     Avoid tobacco and nicotine: Don't smoke, vape, or chew. If you need help quitting, talk to your doctor.   Practice safer sex. Getting tested, using condoms or dental dams, and limiting sex partners can help prevent STIs.     Use birth control if it's important to you to prevent pregnancy. Talk with your doctor about your choices and what might be best for you.   Prevent problems where you can. Protect your skin from too much sun, wash your  all other ROS negative except as per HPI

## 2025-04-16 ENCOUNTER — RESULTS FOLLOW-UP (OUTPATIENT)
Dept: INTERNAL MEDICINE CLINIC | Facility: CLINIC | Age: 40
End: 2025-04-16

## 2025-04-16 ENCOUNTER — OFFICE VISIT (OUTPATIENT)
Dept: OBGYN CLINIC | Age: 40
End: 2025-04-16
Payer: MEDICAID

## 2025-04-16 ENCOUNTER — PATIENT MESSAGE (OUTPATIENT)
Dept: INTERNAL MEDICINE CLINIC | Facility: CLINIC | Age: 40
End: 2025-04-16

## 2025-04-16 VITALS
DIASTOLIC BLOOD PRESSURE: 64 MMHG | SYSTOLIC BLOOD PRESSURE: 102 MMHG | HEIGHT: 62 IN | BODY MASS INDEX: 27.97 KG/M2 | WEIGHT: 152 LBS

## 2025-04-16 DIAGNOSIS — Z11.3 SCREENING FOR STD (SEXUALLY TRANSMITTED DISEASE): ICD-10-CM

## 2025-04-16 DIAGNOSIS — Z11.8 SCREENING EXAMINATION FOR PARASITIC INFECTION: ICD-10-CM

## 2025-04-16 DIAGNOSIS — Z12.4 SCREENING FOR CERVICAL CANCER: ICD-10-CM

## 2025-04-16 DIAGNOSIS — E78.2 MIXED HYPERLIPIDEMIA: ICD-10-CM

## 2025-04-16 DIAGNOSIS — E03.9 ACQUIRED HYPOTHYROIDISM: Primary | ICD-10-CM

## 2025-04-16 DIAGNOSIS — Z11.51 SCREENING FOR HPV (HUMAN PAPILLOMAVIRUS): ICD-10-CM

## 2025-04-16 DIAGNOSIS — Z01.419 WELL WOMAN EXAM WITH ROUTINE GYNECOLOGICAL EXAM: Primary | ICD-10-CM

## 2025-04-16 LAB
HBV SURFACE AG SER QL: NONREACTIVE
HCV AB SER QL: NONREACTIVE
HGB S BLD QL SOLY: NEGATIVE
HIV 1+2 AB+HIV1 P24 AG SERPL QL IA: NONREACTIVE
HIV 1/2 RESULT COMMENT: NORMAL
T PALLIDUM AB SER QL IA: NONREACTIVE

## 2025-04-16 PROCEDURE — 99395 PREV VISIT EST AGE 18-39: CPT | Performed by: OBSTETRICS & GYNECOLOGY

## 2025-04-16 NOTE — PROGRESS NOTES
Patient presents today for   Chief Complaint   Patient presents with    Annual Exam       LMP: Patient's last menstrual period was 03/08/2025 (approximate).  Contraception: NuvaRing vaginal inserts        Allergies   Allergen Reactions    Fluconazole Hives    Propranolol Rash     Current Outpatient Medications   Medication Sig Dispense Refill    Prenatal Vit-Fe Fumarate-FA (PRENATAL 19 PO) Take by mouth      valACYclovir (VALTREX) 1 g tablet Take 1 tablet by mouth daily 90 tablet 3    lamoTRIgine (LAMICTAL) 100 MG tablet Take 1 tablet by mouth daily 90 tablet 0    escitalopram (LEXAPRO) 10 MG tablet Take 1 tablet by mouth daily 90 tablet 0    hydrOXYzine HCl (ATARAX) 25 MG tablet Take 0.5-1 tablets by mouth 2 times daily as needed for Anxiety (sleep) 60 tablet 1    ondansetron (ZOFRAN-ODT) 4 MG disintegrating tablet Take 1 tablet by mouth 3 times daily as needed for Nausea or Vomiting 21 tablet 0    Ascorbic Acid (VITAMIN C) 500 MG CAPS  (Patient not taking: Reported on 4/15/2025)      Misc. Devices KIT Nature's Blend Prenatal Multivitamin with Minerals ([facility] Baby Box)  NDC: [address]-53;  Take 1 softgel by mouth daily or as instructed by provider;  #qs for 6 months (Patient not taking: Reported on 4/21/2025) 1 kit 1     No current facility-administered medications for this visit.     Past Medical History:   Diagnosis Date    Abnormal Pap smear     HGSIL    Acne cystica     Anemia     Anxiety     Asthma     last exacerbation >10yrs. rescue inhaler prn    Deliberate self-cutting     Depression with suicidal ideation     Environmental and seasonal allergies     Family history of breast cancer     H/O seasonal allergies     Hepatic hemangioma     History of hypothyroidism     HSV (herpes simplex virus) anogenital infection     Hyperlipidemia     Hypertension     Multiple thyroid nodules     PCOS (polycystic ovarian syndrome)     Post traumatic stress disorder (PTSD)     Seasonal affective disorder     Shingles

## 2025-04-21 ENCOUNTER — OFFICE VISIT (OUTPATIENT)
Dept: ENDOCRINOLOGY | Age: 40
End: 2025-04-21
Payer: MEDICAID

## 2025-04-21 VITALS
OXYGEN SATURATION: 99 % | DIASTOLIC BLOOD PRESSURE: 76 MMHG | SYSTOLIC BLOOD PRESSURE: 121 MMHG | BODY MASS INDEX: 27.6 KG/M2 | WEIGHT: 150 LBS | HEART RATE: 78 BPM | HEIGHT: 62 IN

## 2025-04-21 DIAGNOSIS — E04.1 THYROID NODULE: Primary | ICD-10-CM

## 2025-04-21 PROCEDURE — 76536 US EXAM OF HEAD AND NECK: CPT | Performed by: INTERNAL MEDICINE

## 2025-04-21 PROCEDURE — 99203 OFFICE O/P NEW LOW 30 MIN: CPT | Performed by: INTERNAL MEDICINE

## 2025-04-21 ASSESSMENT — ENCOUNTER SYMPTOMS: TROUBLE SWALLOWING: 1

## 2025-04-21 NOTE — PROGRESS NOTES
IKER Farrar MD, FACE    [facility] ENDOCRINOLOGY   AND   THYROID NODULE CLINIC            Reason for visit: Юлия Quintero is referred by Celeste Barron MD for the evaluation and management of a thyroid nodule.        ASSESSMENT AND PLAN:    1. Thyroid nodules  Ultrasound was done today.  My findings are significantly different than those on the 2017 ultrasound.  She has a single low risk nodule in the left lobe which, per ACR criteria, requires no additional evaluation.  She has been prescribed levothyroxine but is not hypothyroid.  I recommend that levothyroxine be discontinued, and it should not be resumed unless she is definitively hypothyroid in the future.  Follow-up with me is not necessary.  - US,HEAD/NECK TISSUES,REAL TIME      PROCEDURES:    HEAD AND NECK ULTRASOUND    Date of study:  4/21/2025    Performing/interpreting physician:  IKER Farrar MD, FACE    Indication:  thyroid nodule    Technique:  Using a 12 MHz linear transducer, multiple real-time planar images were obtained of the thyroid and surrounding tissues.      Findings:  Right lobe 1.46 x 1.42 x 3.75 cm, isthmus 0.08 cm, left lobe 1.42 x 1.91 x 3.85 cm.  Homogeneous echotexture.  Normal blood flow.  1.22 x 1.30 x 1.49 cm complex (at least 50% cystic) isoechoic nodule without calcifications or increased blood flow in the mid to lower portion of the left lobe (TR 3).    Impression: Solitary thyroid nodule as noted.    American College of Radiology TI-RADS recommendations:   TR1: Benign, no FNA or ultrasound follow-up   TR2: Nonsuspicious, no FNA or ultrasound follow-up   TR3: Mildly suspicious, FNA if greater than or equal to 2.5 cm, follow with ultrasound at 1, 3,   and 5 years if greater than or equal to 1.5 cm.   TR4: Moderately suspicious, FNA if greater than or equal to 1.5 cm, follow with ultrasound at 1,   2, 3, and 5 years if greater than or equal to 1 cm.   TR5: Highly suspicious, FNA if greater than or

## 2025-04-28 LAB
C TRACH RRNA CVX QL NAA+PROBE: NEGATIVE
COLLECTION METHOD: NORMAL
CYTOLOGIST CVX/VAG CYTO: NORMAL
CYTOLOGY CVX/VAG DOC THIN PREP: NORMAL
DATE OF LMP: 0
HPV APTIMA: NEGATIVE
HPV GENOTYPE REFLEX: NORMAL
Lab: NORMAL
Lab: NORMAL
N GONORRHOEA RRNA CVX QL NAA+PROBE: NEGATIVE
PAP SOURCE: NORMAL
PATH REPORT.FINAL DX SPEC: NORMAL
STAT OF ADQ CVX/VAG CYTO-IMP: NORMAL
T VAGINALIS RRNA SPEC QL NAA+PROBE: NEGATIVE

## 2025-04-30 ENCOUNTER — HOSPITAL ENCOUNTER (OUTPATIENT)
Dept: MAMMOGRAPHY | Age: 40
Discharge: HOME | End: 2025-05-03
Payer: MEDICAID

## 2025-04-30 ENCOUNTER — HOSPITAL ENCOUNTER (OUTPATIENT)
Dept: MAMMOGRAPHY | Age: 40
Discharge: HOME | End: 2025-05-03

## 2025-04-30 VITALS — BODY MASS INDEX: 27.6 KG/M2 | HEIGHT: 62 IN | WEIGHT: 150 LBS

## 2025-04-30 DIAGNOSIS — Z12.31 VISIT FOR SCREENING MAMMOGRAM: ICD-10-CM

## 2025-04-30 PROCEDURE — 77063 BREAST TOMOSYNTHESIS BI: CPT

## 2025-05-07 ENCOUNTER — TELEMEDICINE (OUTPATIENT)
Dept: BEHAVIORAL/MENTAL HEALTH CLINIC | Facility: CLINIC | Age: 40
End: 2025-05-07
Payer: MEDICAID

## 2025-05-07 DIAGNOSIS — F33.1 MAJOR DEPRESSIVE DISORDER, RECURRENT, MODERATE (HCC): ICD-10-CM

## 2025-05-07 DIAGNOSIS — F41.9 ANXIETY DISORDER, UNSPECIFIED TYPE: ICD-10-CM

## 2025-05-07 DIAGNOSIS — G47.00 INSOMNIA, UNSPECIFIED TYPE: ICD-10-CM

## 2025-05-07 PROCEDURE — 99214 OFFICE O/P EST MOD 30 MIN: CPT | Performed by: PSYCHIATRY & NEUROLOGY

## 2025-05-07 RX ORDER — ESCITALOPRAM OXALATE 10 MG/1
10 TABLET ORAL DAILY
Qty: 90 TABLET | Refills: 0 | Status: SHIPPED | OUTPATIENT
Start: 2025-05-07

## 2025-05-07 RX ORDER — LAMOTRIGINE 25 MG/1
25 TABLET ORAL DAILY
Qty: 30 TABLET | Refills: 1 | Status: SHIPPED | OUTPATIENT
Start: 2025-05-07

## 2025-05-07 ASSESSMENT — ANXIETY QUESTIONNAIRES
4. TROUBLE RELAXING: NOT AT ALL
2. NOT BEING ABLE TO STOP OR CONTROL WORRYING: NOT AT ALL
GAD7 TOTAL SCORE: 0
6. BECOMING EASILY ANNOYED OR IRRITABLE: NOT AT ALL
2. NOT BEING ABLE TO STOP OR CONTROL WORRYING: NOT AT ALL
3. WORRYING TOO MUCH ABOUT DIFFERENT THINGS: NOT AT ALL
5. BEING SO RESTLESS THAT IT IS HARD TO SIT STILL: NOT AT ALL
6. BECOMING EASILY ANNOYED OR IRRITABLE: NOT AT ALL
7. FEELING AFRAID AS IF SOMETHING AWFUL MIGHT HAPPEN: NOT AT ALL
7. FEELING AFRAID AS IF SOMETHING AWFUL MIGHT HAPPEN: NOT AT ALL
IF YOU CHECKED OFF ANY PROBLEMS ON THIS QUESTIONNAIRE, HOW DIFFICULT HAVE THESE PROBLEMS MADE IT FOR YOU TO DO YOUR WORK, TAKE CARE OF THINGS AT HOME, OR GET ALONG WITH OTHER PEOPLE: NOT DIFFICULT AT ALL
3. WORRYING TOO MUCH ABOUT DIFFERENT THINGS: NOT AT ALL
IF YOU CHECKED OFF ANY PROBLEMS ON THIS QUESTIONNAIRE, HOW DIFFICULT HAVE THESE PROBLEMS MADE IT FOR YOU TO DO YOUR WORK, TAKE CARE OF THINGS AT HOME, OR GET ALONG WITH OTHER PEOPLE: NOT DIFFICULT AT ALL
5. BEING SO RESTLESS THAT IT IS HARD TO SIT STILL: NOT AT ALL
1. FEELING NERVOUS, ANXIOUS, OR ON EDGE: NOT AT ALL
1. FEELING NERVOUS, ANXIOUS, OR ON EDGE: NOT AT ALL
4. TROUBLE RELAXING: NOT AT ALL

## 2025-05-07 ASSESSMENT — PATIENT HEALTH QUESTIONNAIRE - PHQ9
4. FEELING TIRED OR HAVING LITTLE ENERGY: NOT AT ALL
10. IF YOU CHECKED OFF ANY PROBLEMS, HOW DIFFICULT HAVE THESE PROBLEMS MADE IT FOR YOU TO DO YOUR WORK, TAKE CARE OF THINGS AT HOME, OR GET ALONG WITH OTHER PEOPLE: NOT DIFFICULT AT ALL
SUM OF ALL RESPONSES TO PHQ QUESTIONS 1-9: 0
2. FEELING DOWN, DEPRESSED OR HOPELESS: NOT AT ALL
5. POOR APPETITE OR OVEREATING: NOT AT ALL
SUM OF ALL RESPONSES TO PHQ QUESTIONS 1-9: 0
2. FEELING DOWN, DEPRESSED OR HOPELESS: NOT AT ALL
SUM OF ALL RESPONSES TO PHQ QUESTIONS 1-9: 0
3. TROUBLE FALLING OR STAYING ASLEEP: NOT AT ALL
1. LITTLE INTEREST OR PLEASURE IN DOING THINGS: NOT AT ALL
1. LITTLE INTEREST OR PLEASURE IN DOING THINGS: NOT AT ALL
4. FEELING TIRED OR HAVING LITTLE ENERGY: NOT AT ALL
3. TROUBLE FALLING OR STAYING ASLEEP: NOT AT ALL
7. TROUBLE CONCENTRATING ON THINGS, SUCH AS READING THE NEWSPAPER OR WATCHING TELEVISION: NOT AT ALL
9. THOUGHTS THAT YOU WOULD BE BETTER OFF DEAD, OR OF HURTING YOURSELF: NOT AT ALL
10. IF YOU CHECKED OFF ANY PROBLEMS, HOW DIFFICULT HAVE THESE PROBLEMS MADE IT FOR YOU TO DO YOUR WORK, TAKE CARE OF THINGS AT HOME, OR GET ALONG WITH OTHER PEOPLE: NOT DIFFICULT AT ALL
6. FEELING BAD ABOUT YOURSELF - OR THAT YOU ARE A FAILURE OR HAVE LET YOURSELF OR YOUR FAMILY DOWN: NOT AT ALL
9. THOUGHTS THAT YOU WOULD BE BETTER OFF DEAD, OR OF HURTING YOURSELF: NOT AT ALL
5. POOR APPETITE OR OVEREATING: NOT AT ALL
8. MOVING OR SPEAKING SO SLOWLY THAT OTHER PEOPLE COULD HAVE NOTICED. OR THE OPPOSITE, BEING SO FIGETY OR RESTLESS THAT YOU HAVE BEEN MOVING AROUND A LOT MORE THAN USUAL: NOT AT ALL
7. TROUBLE CONCENTRATING ON THINGS, SUCH AS READING THE NEWSPAPER OR WATCHING TELEVISION: NOT AT ALL
8. MOVING OR SPEAKING SO SLOWLY THAT OTHER PEOPLE COULD HAVE NOTICED. OR THE OPPOSITE - BEING SO FIDGETY OR RESTLESS THAT YOU HAVE BEEN MOVING AROUND A LOT MORE THAN USUAL: NOT AT ALL
6. FEELING BAD ABOUT YOURSELF - OR THAT YOU ARE A FAILURE OR HAVE LET YOURSELF OR YOUR FAMILY DOWN: NOT AT ALL

## 2025-05-07 NOTE — PROGRESS NOTES
PROGRESS NOTE  Patient: Юлия Quintero         Date: 2025   MR#: [id number]              : 1985  IDENTIFYING INFORMATION: This is a 40 y.o. old female who is a patient whom presents to clinic for follow up evaluation.   Юлия Quintero was evaluated through a synchronous (real-time) audio-video encounter. The patient (or guardian if applicable) is aware that this is a billable service, which includes applicable co-pays. This Virtual Visit was conducted with patient's (and/or legal guardian's) consent. Patient identification was verified, and a caregiver was present when appropriate.   The patient was located at Home: [address] [place] [phone]  Provider was located at Facility (Appt Dept): [address] [place] [phone]  Confirm you are appropriately licensed, registered, or certified to deliver care in the state where the patient is located as indicated above. If you are not or unsure, please re-schedule the visit: Yes, I confirm.     CHIEF COMPLAINT: mood, anxiety  HISTORY OF PRESENT ILLNESS:  Interval History:  Endorses stopping Lamictal and mood became worse. Started back was tired from taking Lamictal. States she has been cutting 100 mg tablet into 1/3 and taking at night. Discussed staying on 25 mg at night and seeing how her mood does over several months. Continue to take Lexapro and uses Hydroxyzine as needed for anxiety. Saw Endocrine and does not appear to have hypothyroid according to their note and was advised not to take Levothyroxine. Discussed looking at b12, vit d levels. No concern over her LFT's. ALT is not elevated enough to present an issue and likely transient elevated very mildly due to some level of inflation in the body.   Current Symptoms  Duration: chronic  Sleep: stable  Appetite: fair  Anxiety: endorses  Mood: pleasant  Compliance with medications: endorses  Side effects from the medications: SI from SSRI?  Current

## 2025-07-03 ENCOUNTER — TELEMEDICINE (OUTPATIENT)
Dept: BEHAVIORAL/MENTAL HEALTH CLINIC | Facility: CLINIC | Age: 40
End: 2025-07-03
Payer: MEDICAID

## 2025-07-03 DIAGNOSIS — F33.1 MAJOR DEPRESSIVE DISORDER, RECURRENT, MODERATE (HCC): ICD-10-CM

## 2025-07-03 DIAGNOSIS — F41.9 ANXIETY DISORDER, UNSPECIFIED TYPE: ICD-10-CM

## 2025-07-03 PROCEDURE — 99214 OFFICE O/P EST MOD 30 MIN: CPT | Performed by: PSYCHIATRY & NEUROLOGY

## 2025-07-03 RX ORDER — HYDROXYZINE HYDROCHLORIDE 25 MG/1
12.5-25 TABLET, FILM COATED ORAL 2 TIMES DAILY PRN
Qty: 60 TABLET | Refills: 1 | Status: SHIPPED | OUTPATIENT
Start: 2025-07-03 | End: 2025-09-01

## 2025-07-03 RX ORDER — ESCITALOPRAM OXALATE 10 MG/1
10 TABLET ORAL DAILY
Qty: 90 TABLET | Refills: 0 | Status: SHIPPED | OUTPATIENT
Start: 2025-07-03

## 2025-07-03 RX ORDER — LAMOTRIGINE 150 MG/1
150 TABLET ORAL DAILY
Qty: 30 TABLET | Refills: 1 | Status: SHIPPED | OUTPATIENT
Start: 2025-07-03

## 2025-07-03 ASSESSMENT — PATIENT HEALTH QUESTIONNAIRE - PHQ9
6. FEELING BAD ABOUT YOURSELF - OR THAT YOU ARE A FAILURE OR HAVE LET YOURSELF OR YOUR FAMILY DOWN: NOT AT ALL
3. TROUBLE FALLING OR STAYING ASLEEP: NOT AT ALL
9. THOUGHTS THAT YOU WOULD BE BETTER OFF DEAD, OR OF HURTING YOURSELF: NOT AT ALL
4. FEELING TIRED OR HAVING LITTLE ENERGY: NOT AT ALL
10. IF YOU CHECKED OFF ANY PROBLEMS, HOW DIFFICULT HAVE THESE PROBLEMS MADE IT FOR YOU TO DO YOUR WORK, TAKE CARE OF THINGS AT HOME, OR GET ALONG WITH OTHER PEOPLE: NOT DIFFICULT AT ALL
7. TROUBLE CONCENTRATING ON THINGS, SUCH AS READING THE NEWSPAPER OR WATCHING TELEVISION: NOT AT ALL
2. FEELING DOWN, DEPRESSED OR HOPELESS: NOT AT ALL
SUM OF ALL RESPONSES TO PHQ QUESTIONS 1-9: 0
SUM OF ALL RESPONSES TO PHQ QUESTIONS 1-9: 0
10. IF YOU CHECKED OFF ANY PROBLEMS, HOW DIFFICULT HAVE THESE PROBLEMS MADE IT FOR YOU TO DO YOUR WORK, TAKE CARE OF THINGS AT HOME, OR GET ALONG WITH OTHER PEOPLE: NOT DIFFICULT AT ALL
2. FEELING DOWN, DEPRESSED OR HOPELESS: NOT AT ALL
7. TROUBLE CONCENTRATING ON THINGS, SUCH AS READING THE NEWSPAPER OR WATCHING TELEVISION: NOT AT ALL
6. FEELING BAD ABOUT YOURSELF - OR THAT YOU ARE A FAILURE OR HAVE LET YOURSELF OR YOUR FAMILY DOWN: NOT AT ALL
5. POOR APPETITE OR OVEREATING: NOT AT ALL
5. POOR APPETITE OR OVEREATING: NOT AT ALL
9. THOUGHTS THAT YOU WOULD BE BETTER OFF DEAD, OR OF HURTING YOURSELF: NOT AT ALL
8. MOVING OR SPEAKING SO SLOWLY THAT OTHER PEOPLE COULD HAVE NOTICED. OR THE OPPOSITE - BEING SO FIDGETY OR RESTLESS THAT YOU HAVE BEEN MOVING AROUND A LOT MORE THAN USUAL: NOT AT ALL
SUM OF ALL RESPONSES TO PHQ QUESTIONS 1-9: 0
4. FEELING TIRED OR HAVING LITTLE ENERGY: NOT AT ALL
3. TROUBLE FALLING OR STAYING ASLEEP: NOT AT ALL
8. MOVING OR SPEAKING SO SLOWLY THAT OTHER PEOPLE COULD HAVE NOTICED. OR THE OPPOSITE, BEING SO FIGETY OR RESTLESS THAT YOU HAVE BEEN MOVING AROUND A LOT MORE THAN USUAL: NOT AT ALL
1. LITTLE INTEREST OR PLEASURE IN DOING THINGS: NOT AT ALL
SUM OF ALL RESPONSES TO PHQ QUESTIONS 1-9: 0
1. LITTLE INTEREST OR PLEASURE IN DOING THINGS: NOT AT ALL
SUM OF ALL RESPONSES TO PHQ QUESTIONS 1-9: 0

## 2025-07-03 ASSESSMENT — ANXIETY QUESTIONNAIRES
4. TROUBLE RELAXING: NOT AT ALL
3. WORRYING TOO MUCH ABOUT DIFFERENT THINGS: NOT AT ALL
7. FEELING AFRAID AS IF SOMETHING AWFUL MIGHT HAPPEN: NOT AT ALL
5. BEING SO RESTLESS THAT IT IS HARD TO SIT STILL: NOT AT ALL
2. NOT BEING ABLE TO STOP OR CONTROL WORRYING: NOT AT ALL
IF YOU CHECKED OFF ANY PROBLEMS ON THIS QUESTIONNAIRE, HOW DIFFICULT HAVE THESE PROBLEMS MADE IT FOR YOU TO DO YOUR WORK, TAKE CARE OF THINGS AT HOME, OR GET ALONG WITH OTHER PEOPLE: NOT DIFFICULT AT ALL
4. TROUBLE RELAXING: NOT AT ALL
GAD7 TOTAL SCORE: 0
2. NOT BEING ABLE TO STOP OR CONTROL WORRYING: NOT AT ALL
7. FEELING AFRAID AS IF SOMETHING AWFUL MIGHT HAPPEN: NOT AT ALL
6. BECOMING EASILY ANNOYED OR IRRITABLE: NOT AT ALL
IF YOU CHECKED OFF ANY PROBLEMS ON THIS QUESTIONNAIRE, HOW DIFFICULT HAVE THESE PROBLEMS MADE IT FOR YOU TO DO YOUR WORK, TAKE CARE OF THINGS AT HOME, OR GET ALONG WITH OTHER PEOPLE: NOT DIFFICULT AT ALL
5. BEING SO RESTLESS THAT IT IS HARD TO SIT STILL: NOT AT ALL
3. WORRYING TOO MUCH ABOUT DIFFERENT THINGS: NOT AT ALL
1. FEELING NERVOUS, ANXIOUS, OR ON EDGE: NOT AT ALL
1. FEELING NERVOUS, ANXIOUS, OR ON EDGE: NOT AT ALL
6. BECOMING EASILY ANNOYED OR IRRITABLE: NOT AT ALL

## 2025-07-03 NOTE — PROGRESS NOTES
PROGRESS NOTE  Patient: Юлия Quintero         Date: 7/3/2025   MR#: [id number]              : 1985  IDENTIFYING INFORMATION: This is a 40 y.o. old female who is a patient whom presents to clinic for follow up evaluation.   Юлия Quintero was evaluated through a synchronous (real-time) audio-video encounter. The patient (or guardian if applicable) is aware that this is a billable service, which includes applicable co-pays. This Virtual Visit was conducted with patient's (and/or legal guardian's) consent. Patient identification was verified, and a caregiver was present when appropriate.   The patient was located at Home: [address] [place] [id number]  Provider was located at Facility (Appt Dept): [facility] [place] [address]  Confirm you are appropriately licensed, registered, or certified to deliver care in the [facility] where the patient is located as indicated above. If you are not or unsure, please re-schedule the visit: Yes, I confirm.     CHIEF COMPLAINT: mood, anxiety  HISTORY OF PRESENT ILLNESS:  Interval History:  Endorses mood has improved with Lamictal. Titrated up to 100 mg per day. Discussed increasing to 150 mg since she is having a good response. Continue with Lexapro. Sleep remains stable. Uses Hydroxyzine as needed for anxiety. Monitor. Wants to find a new therapist.  Current Symptoms  Duration: chronic  Sleep: stable  Appetite: fair  Anxiety: endorses  Mood: pleasant  Compliance with medications: endorses  Side effects from the medications: SI from SSRI?  Current stressors: finances, work, relationship with ex     Memory changes/ADL's if applicable: baseline  Substance History: EtOH: rare Illicit Substances cannabis couple times per month  Family History: mother - depression, anxiety  Social History: never ,   Lives with/Support from: lives with children    Review of Systems:  Constitutional: Negative for chills and fever.  HEENT:

## 2025-09-02 ENCOUNTER — TELEMEDICINE (OUTPATIENT)
Dept: BEHAVIORAL/MENTAL HEALTH CLINIC | Facility: CLINIC | Age: 40
End: 2025-09-02
Payer: MEDICAID

## 2025-09-02 DIAGNOSIS — F41.9 ANXIETY DISORDER, UNSPECIFIED TYPE: ICD-10-CM

## 2025-09-02 DIAGNOSIS — F33.1 MAJOR DEPRESSIVE DISORDER, RECURRENT, MODERATE (HCC): Primary | ICD-10-CM

## 2025-09-02 PROCEDURE — 99214 OFFICE O/P EST MOD 30 MIN: CPT | Performed by: PSYCHIATRY & NEUROLOGY

## 2025-09-02 RX ORDER — ESCITALOPRAM OXALATE 10 MG/1
10 TABLET ORAL DAILY
Qty: 90 TABLET | Refills: 0 | Status: SHIPPED | OUTPATIENT
Start: 2025-09-02

## 2025-09-02 RX ORDER — HYDROXYZINE HYDROCHLORIDE 25 MG/1
12.5-25 TABLET, FILM COATED ORAL 2 TIMES DAILY PRN
Qty: 60 TABLET | Refills: 1 | Status: SHIPPED | OUTPATIENT
Start: 2025-09-02 | End: 2025-11-01

## 2025-09-02 RX ORDER — LAMOTRIGINE 150 MG/1
150 TABLET ORAL DAILY
Qty: 90 TABLET | Refills: 0 | Status: SHIPPED | OUTPATIENT
Start: 2025-09-02

## 2025-09-02 ASSESSMENT — PATIENT HEALTH QUESTIONNAIRE - PHQ9
9. THOUGHTS THAT YOU WOULD BE BETTER OFF DEAD, OR OF HURTING YOURSELF: NOT AT ALL
SUM OF ALL RESPONSES TO PHQ QUESTIONS 1-9: 0
8. MOVING OR SPEAKING SO SLOWLY THAT OTHER PEOPLE COULD HAVE NOTICED. OR THE OPPOSITE, BEING SO FIGETY OR RESTLESS THAT YOU HAVE BEEN MOVING AROUND A LOT MORE THAN USUAL: NOT AT ALL
3. TROUBLE FALLING OR STAYING ASLEEP: NOT AT ALL
6. FEELING BAD ABOUT YOURSELF - OR THAT YOU ARE A FAILURE OR HAVE LET YOURSELF OR YOUR FAMILY DOWN: NOT AT ALL
1. LITTLE INTEREST OR PLEASURE IN DOING THINGS: NOT AT ALL
5. POOR APPETITE OR OVEREATING: NOT AT ALL
6. FEELING BAD ABOUT YOURSELF - OR THAT YOU ARE A FAILURE OR HAVE LET YOURSELF OR YOUR FAMILY DOWN: NOT AT ALL
SUM OF ALL RESPONSES TO PHQ QUESTIONS 1-9: 0
SUM OF ALL RESPONSES TO PHQ QUESTIONS 1-9: 0
8. MOVING OR SPEAKING SO SLOWLY THAT OTHER PEOPLE COULD HAVE NOTICED. OR THE OPPOSITE - BEING SO FIDGETY OR RESTLESS THAT YOU HAVE BEEN MOVING AROUND A LOT MORE THAN USUAL: NOT AT ALL
SUM OF ALL RESPONSES TO PHQ QUESTIONS 1-9: 0
4. FEELING TIRED OR HAVING LITTLE ENERGY: NOT AT ALL
2. FEELING DOWN, DEPRESSED OR HOPELESS: NOT AT ALL
7. TROUBLE CONCENTRATING ON THINGS, SUCH AS READING THE NEWSPAPER OR WATCHING TELEVISION: NOT AT ALL
4. FEELING TIRED OR HAVING LITTLE ENERGY: NOT AT ALL
7. TROUBLE CONCENTRATING ON THINGS, SUCH AS READING THE NEWSPAPER OR WATCHING TELEVISION: NOT AT ALL
2. FEELING DOWN, DEPRESSED OR HOPELESS: NOT AT ALL
5. POOR APPETITE OR OVEREATING: NOT AT ALL
10. IF YOU CHECKED OFF ANY PROBLEMS, HOW DIFFICULT HAVE THESE PROBLEMS MADE IT FOR YOU TO DO YOUR WORK, TAKE CARE OF THINGS AT HOME, OR GET ALONG WITH OTHER PEOPLE: NOT DIFFICULT AT ALL
9. THOUGHTS THAT YOU WOULD BE BETTER OFF DEAD, OR OF HURTING YOURSELF: NOT AT ALL
SUM OF ALL RESPONSES TO PHQ QUESTIONS 1-9: 0
10. IF YOU CHECKED OFF ANY PROBLEMS, HOW DIFFICULT HAVE THESE PROBLEMS MADE IT FOR YOU TO DO YOUR WORK, TAKE CARE OF THINGS AT HOME, OR GET ALONG WITH OTHER PEOPLE: NOT DIFFICULT AT ALL
3. TROUBLE FALLING OR STAYING ASLEEP: NOT AT ALL
1. LITTLE INTEREST OR PLEASURE IN DOING THINGS: NOT AT ALL

## 2025-09-02 ASSESSMENT — ANXIETY QUESTIONNAIRES
4. TROUBLE RELAXING: NOT AT ALL
5. BEING SO RESTLESS THAT IT IS HARD TO SIT STILL: NOT AT ALL
7. FEELING AFRAID AS IF SOMETHING AWFUL MIGHT HAPPEN: NOT AT ALL
IF YOU CHECKED OFF ANY PROBLEMS ON THIS QUESTIONNAIRE, HOW DIFFICULT HAVE THESE PROBLEMS MADE IT FOR YOU TO DO YOUR WORK, TAKE CARE OF THINGS AT HOME, OR GET ALONG WITH OTHER PEOPLE: NOT DIFFICULT AT ALL
5. BEING SO RESTLESS THAT IT IS HARD TO SIT STILL: NOT AT ALL
3. WORRYING TOO MUCH ABOUT DIFFERENT THINGS: NOT AT ALL
GAD7 TOTAL SCORE: 0
1. FEELING NERVOUS, ANXIOUS, OR ON EDGE: NOT AT ALL
4. TROUBLE RELAXING: NOT AT ALL
2. NOT BEING ABLE TO STOP OR CONTROL WORRYING: NOT AT ALL
IF YOU CHECKED OFF ANY PROBLEMS ON THIS QUESTIONNAIRE, HOW DIFFICULT HAVE THESE PROBLEMS MADE IT FOR YOU TO DO YOUR WORK, TAKE CARE OF THINGS AT HOME, OR GET ALONG WITH OTHER PEOPLE: NOT DIFFICULT AT ALL
3. WORRYING TOO MUCH ABOUT DIFFERENT THINGS: NOT AT ALL
7. FEELING AFRAID AS IF SOMETHING AWFUL MIGHT HAPPEN: NOT AT ALL
2. NOT BEING ABLE TO STOP OR CONTROL WORRYING: NOT AT ALL
1. FEELING NERVOUS, ANXIOUS, OR ON EDGE: NOT AT ALL
6. BECOMING EASILY ANNOYED OR IRRITABLE: NOT AT ALL
6. BECOMING EASILY ANNOYED OR IRRITABLE: NOT AT ALL

## 2025-09-05 RX ORDER — ONDANSETRON 4 MG/1
4 TABLET, ORALLY DISINTEGRATING ORAL 3 TIMES DAILY PRN
Qty: 21 TABLET | Refills: 0 | Status: SHIPPED | OUTPATIENT
Start: 2025-09-05